# Patient Record
Sex: FEMALE | Race: WHITE | NOT HISPANIC OR LATINO | Employment: OTHER | URBAN - METROPOLITAN AREA
[De-identification: names, ages, dates, MRNs, and addresses within clinical notes are randomized per-mention and may not be internally consistent; named-entity substitution may affect disease eponyms.]

---

## 2017-02-24 ENCOUNTER — HOSPITAL ENCOUNTER (OUTPATIENT)
Facility: HOSPITAL | Age: 62
Setting detail: HOSPITAL OUTPATIENT SURGERY
End: 2017-02-24
Attending: ORTHOPAEDIC SURGERY | Admitting: ORTHOPAEDIC SURGERY

## 2017-04-18 ENCOUNTER — APPOINTMENT (OUTPATIENT)
Dept: PREADMISSION TESTING | Facility: HOSPITAL | Age: 62
End: 2017-04-18

## 2017-04-18 VITALS
OXYGEN SATURATION: 98 % | DIASTOLIC BLOOD PRESSURE: 75 MMHG | SYSTOLIC BLOOD PRESSURE: 129 MMHG | RESPIRATION RATE: 16 BRPM | BODY MASS INDEX: 33.91 KG/M2 | WEIGHT: 211 LBS | TEMPERATURE: 98.6 F | HEIGHT: 66 IN | HEART RATE: 103 BPM

## 2017-04-18 LAB
ALBUMIN SERPL-MCNC: 3.8 G/DL (ref 3.5–5.2)
ALBUMIN/GLOB SERPL: 1.1 G/DL
ALP SERPL-CCNC: 75 U/L (ref 39–117)
ALT SERPL W P-5'-P-CCNC: 10 U/L (ref 1–33)
ANION GAP SERPL CALCULATED.3IONS-SCNC: 20.2 MMOL/L
AST SERPL-CCNC: 14 U/L (ref 1–32)
BASOPHILS # BLD AUTO: 0.02 10*3/MM3 (ref 0–0.2)
BASOPHILS NFR BLD AUTO: 0.2 % (ref 0–1.5)
BILIRUB SERPL-MCNC: 0.3 MG/DL (ref 0.1–1.2)
BUN BLD-MCNC: 18 MG/DL (ref 8–23)
BUN/CREAT SERPL: 31 (ref 7–25)
CALCIUM SPEC-SCNC: 10 MG/DL (ref 8.6–10.5)
CHLORIDE SERPL-SCNC: 96 MMOL/L (ref 98–107)
CO2 SERPL-SCNC: 23.8 MMOL/L (ref 22–29)
CREAT BLD-MCNC: 0.58 MG/DL (ref 0.57–1)
DEPRECATED RDW RBC AUTO: 50.4 FL (ref 37–54)
EOSINOPHIL # BLD AUTO: 0.07 10*3/MM3 (ref 0–0.7)
EOSINOPHIL NFR BLD AUTO: 0.5 % (ref 0.3–6.2)
ERYTHROCYTE [DISTWIDTH] IN BLOOD BY AUTOMATED COUNT: 16.3 % (ref 11.7–13)
GFR SERPL CREATININE-BSD FRML MDRD: 105 ML/MIN/1.73
GLOBULIN UR ELPH-MCNC: 3.4 GM/DL
GLUCOSE BLD-MCNC: 147 MG/DL (ref 65–99)
HBA1C MFR BLD: 8.25 % (ref 4.8–5.6)
HCT VFR BLD AUTO: 41.9 % (ref 35.6–45.5)
HGB BLD-MCNC: 13 G/DL (ref 11.9–15.5)
IMM GRANULOCYTES # BLD: 0.11 10*3/MM3 (ref 0–0.03)
IMM GRANULOCYTES NFR BLD: 0.8 % (ref 0–0.5)
LYMPHOCYTES # BLD AUTO: 2.15 10*3/MM3 (ref 0.9–4.8)
LYMPHOCYTES NFR BLD AUTO: 16.3 % (ref 19.6–45.3)
MCH RBC QN AUTO: 26.5 PG (ref 26.9–32)
MCHC RBC AUTO-ENTMCNC: 31 G/DL (ref 32.4–36.3)
MCV RBC AUTO: 85.3 FL (ref 80.5–98.2)
MONOCYTES # BLD AUTO: 0.49 10*3/MM3 (ref 0.2–1.2)
MONOCYTES NFR BLD AUTO: 3.7 % (ref 5–12)
NEUTROPHILS # BLD AUTO: 10.37 10*3/MM3 (ref 1.9–8.1)
NEUTROPHILS NFR BLD AUTO: 78.5 % (ref 42.7–76)
PLATELET # BLD AUTO: 271 10*3/MM3 (ref 140–500)
PMV BLD AUTO: 10.5 FL (ref 6–12)
POTASSIUM BLD-SCNC: 3.6 MMOL/L (ref 3.5–5.2)
PROT SERPL-MCNC: 7.2 G/DL (ref 6–8.5)
RBC # BLD AUTO: 4.91 10*6/MM3 (ref 3.9–5.2)
SODIUM BLD-SCNC: 140 MMOL/L (ref 136–145)
WBC NRBC COR # BLD: 13.21 10*3/MM3 (ref 4.5–10.7)

## 2017-04-18 PROCEDURE — 80053 COMPREHEN METABOLIC PANEL: CPT

## 2017-04-18 PROCEDURE — 36415 COLL VENOUS BLD VENIPUNCTURE: CPT

## 2017-04-18 PROCEDURE — 83036 HEMOGLOBIN GLYCOSYLATED A1C: CPT

## 2017-04-18 PROCEDURE — 85025 COMPLETE CBC W/AUTO DIFF WBC: CPT

## 2017-04-18 PROCEDURE — 93010 ELECTROCARDIOGRAM REPORT: CPT | Performed by: INTERNAL MEDICINE

## 2017-04-18 PROCEDURE — 93005 ELECTROCARDIOGRAM TRACING: CPT

## 2017-04-18 NOTE — DISCHARGE INSTRUCTIONS
Take the following medications the morning of surgery with a small sip of water: AMLODIPINE        General Instructions:  • Do not eat solid food after midnight the night before surgery.  • You may drink clear liquids day of surgery but must stop at least one hour before your hospital arrival time.  • It is beneficial for you to have a clear drink that contains carbohydrates the day of surgery.  We suggest a 20 ounce bottle of Gatorade or Powerade for non-diabetic patients or a 20 ounce bottle of G2 or Powerade Zero for diabetic patients. (Pediatric patients, are not advised to drink a 20 ounce carbohydrate drink)    Clear liquids are liquids you can see through.  Nothing red in color.     Plain water                               Sports drinks  Sodas                                   Gelatin (Jell-O)  Fruit juices without pulp such as white grape juice and apple juice  Popsicles that contain no fruit or yogurt  Tea or coffee (no cream or milk added)  Gatorade / Powerade  G2 / Powerade Zero    • Infants may have breast milk up to four hours before surgery.  • Infants drinking formula may drink formula up to six hours before surgery.   • Patients who avoid smoking, chewing tobacco and alcohol for 4 weeks prior to surgery have a reduced risk of post-operative complications.  Quit smoking as many days before surgery as you can.  • Do not smoke, use chewing tobacco or drink alcohol the day of surgery.   • If applicable bring your C-PAP/ BI-PAP machine.  • Bring any papers given to you in the doctor’s office.  • Wear clean comfortable clothes and socks.  • Do not wear contact lenses or make-up.  Bring a case for your glasses.   • Bring crutches or walker if applicable.  • Leave all other valuables and jewelry at home.  • The Pre-Admission Testing nurse will instruct you to bring medications if unable to obtain an accurate list in Pre-Admission Testing.        If you were given a blood bank ID arm band remember to bring  it with you the day of surgery.    Preventing a Surgical Site Infection:  • For 2 to 3 days before surgery, avoid shaving with a razor because the razor can irritate skin and make it easier to develop an infection.  • The night prior to surgery sleep in a clean bed with clean clothing.  Do not allow pets to sleep with you.  • Shower on the morning of surgery using a fresh bar of anti-bacterial soap (such as Dial) and clean washcloth.  Dry with a clean towel and dress in clean clothing.  • Ask your surgeon if you will be receiving antibiotics prior to surgery.  • Make sure you, your family, and all healthcare providers clean their hands with soap and water or an alcohol based hand  before caring for you or your wound.    Day of surgery:  Upon arrival, a Pre-op nurse and Anesthesiologist will review your health history, obtain vital signs, and answer questions you may have.  The only belongings needed at this time will be your home medications and if applicable your C-PAP/BI-PAP machine.  If you are staying overnight your family can leave the rest of your belongings in the car and bring them to your room later.  A Pre-op nurse will start an IV and you may receive medication in preparation for surgery, including something to help you relax.  Your family will be able to see you in the Pre-op area.  While you are in surgery your family should notify the waiting room  if they leave the waiting room area and provide a contact phone number.    Please be aware that surgery does come with discomfort.  We want to make every effort to control your discomfort so please discuss any uncontrolled symptoms with your nurse.   Your doctor will most likely have prescribed pain medications.      If you are going home after surgery you will receive individualized written care instructions before being discharged.  A responsible adult must drive you to and from the hospital on the day of your surgery and stay with you  for 24 hours.    If you are staying overnight following surgery, you will be transported to your hospital room following the recovery period.  [facility] has all private rooms.    If you have any questions please call Pre-Admission Testing at [phone].  Deductibles and co-payments are collected on the day of service. Please be prepared to pay the required co-pay, deductible or deposit on the day of service as defined by your plan.

## 2017-05-17 ENCOUNTER — TRANSCRIBE ORDERS (OUTPATIENT)
Dept: ADMINISTRATIVE | Facility: HOSPITAL | Age: 62
End: 2017-05-17

## 2017-05-17 ENCOUNTER — LAB (OUTPATIENT)
Dept: LAB | Facility: HOSPITAL | Age: 62
End: 2017-05-17
Attending: ORTHOPAEDIC SURGERY

## 2017-05-17 DIAGNOSIS — Z01.818 PRE-OP EXAM: ICD-10-CM

## 2017-05-17 DIAGNOSIS — Z01.818 PRE-OP EXAM: Primary | ICD-10-CM

## 2017-05-17 LAB
ALBUMIN SERPL-MCNC: 3.8 G/DL (ref 3.5–5.2)
ALBUMIN/GLOB SERPL: 1 G/DL
ALP SERPL-CCNC: 79 U/L (ref 39–117)
ALT SERPL W P-5'-P-CCNC: 7 U/L (ref 1–33)
ANION GAP SERPL CALCULATED.3IONS-SCNC: 17.1 MMOL/L
AST SERPL-CCNC: 13 U/L (ref 1–32)
BASOPHILS # BLD AUTO: 0.04 10*3/MM3 (ref 0–0.2)
BASOPHILS NFR BLD AUTO: 0.3 % (ref 0–1.5)
BILIRUB SERPL-MCNC: 0.3 MG/DL (ref 0.1–1.2)
BUN BLD-MCNC: 23 MG/DL (ref 8–23)
BUN/CREAT SERPL: 37.7 (ref 7–25)
CALCIUM SPEC-SCNC: 10 MG/DL (ref 8.6–10.5)
CHLORIDE SERPL-SCNC: 95 MMOL/L (ref 98–107)
CO2 SERPL-SCNC: 27.9 MMOL/L (ref 22–29)
CREAT BLD-MCNC: 0.61 MG/DL (ref 0.57–1)
DEPRECATED RDW RBC AUTO: 49.7 FL (ref 37–54)
EOSINOPHIL # BLD AUTO: 0.11 10*3/MM3 (ref 0–0.7)
EOSINOPHIL NFR BLD AUTO: 0.9 % (ref 0.3–6.2)
ERYTHROCYTE [DISTWIDTH] IN BLOOD BY AUTOMATED COUNT: 16.3 % (ref 11.7–13)
GFR SERPL CREATININE-BSD FRML MDRD: 99 ML/MIN/1.73
GLOBULIN UR ELPH-MCNC: 3.7 GM/DL
GLUCOSE BLD-MCNC: 156 MG/DL (ref 65–99)
HBA1C MFR BLD: 7.5 % (ref 4.8–5.6)
HCT VFR BLD AUTO: 41.2 % (ref 35.6–45.5)
HGB BLD-MCNC: 13 G/DL (ref 11.9–15.5)
IMM GRANULOCYTES # BLD: 0.12 10*3/MM3 (ref 0–0.03)
IMM GRANULOCYTES NFR BLD: 1 % (ref 0–0.5)
LYMPHOCYTES # BLD AUTO: 2.28 10*3/MM3 (ref 0.9–4.8)
LYMPHOCYTES NFR BLD AUTO: 19.7 % (ref 19.6–45.3)
MCH RBC QN AUTO: 26.6 PG (ref 26.9–32)
MCHC RBC AUTO-ENTMCNC: 31.6 G/DL (ref 32.4–36.3)
MCV RBC AUTO: 84.4 FL (ref 80.5–98.2)
MONOCYTES # BLD AUTO: 0.56 10*3/MM3 (ref 0.2–1.2)
MONOCYTES NFR BLD AUTO: 4.8 % (ref 5–12)
NEUTROPHILS # BLD AUTO: 8.48 10*3/MM3 (ref 1.9–8.1)
NEUTROPHILS NFR BLD AUTO: 73.3 % (ref 42.7–76)
PLATELET # BLD AUTO: 246 10*3/MM3 (ref 140–500)
PMV BLD AUTO: 10.4 FL (ref 6–12)
POTASSIUM BLD-SCNC: 4.1 MMOL/L (ref 3.5–5.2)
PROT SERPL-MCNC: 7.5 G/DL (ref 6–8.5)
RBC # BLD AUTO: 4.88 10*6/MM3 (ref 3.9–5.2)
SODIUM BLD-SCNC: 140 MMOL/L (ref 136–145)
WBC NRBC COR # BLD: 11.59 10*3/MM3 (ref 4.5–10.7)

## 2017-05-17 PROCEDURE — 85025 COMPLETE CBC W/AUTO DIFF WBC: CPT

## 2017-05-17 PROCEDURE — 36415 COLL VENOUS BLD VENIPUNCTURE: CPT

## 2017-05-17 PROCEDURE — 83036 HEMOGLOBIN GLYCOSYLATED A1C: CPT

## 2017-05-17 PROCEDURE — 80053 COMPREHEN METABOLIC PANEL: CPT

## 2019-06-17 ENCOUNTER — LAB (OUTPATIENT)
Dept: LAB | Facility: HOSPITAL | Age: 64
End: 2019-06-17

## 2019-06-17 ENCOUNTER — HOSPITAL ENCOUNTER (OUTPATIENT)
Dept: CARDIOLOGY | Facility: HOSPITAL | Age: 64
Discharge: HOME | End: 2019-06-17
Admitting: ORTHOPAEDIC SURGERY

## 2019-06-17 ENCOUNTER — TRANSCRIBE ORDERS (OUTPATIENT)
Dept: ADMINISTRATIVE | Facility: HOSPITAL | Age: 64
End: 2019-06-17

## 2019-06-17 DIAGNOSIS — Z01.818 PRE-OP TESTING: Primary | ICD-10-CM

## 2019-06-17 DIAGNOSIS — Z01.818 PRE-OP TESTING: ICD-10-CM

## 2019-06-17 LAB
ALBUMIN SERPL-MCNC: 4.3 G/DL (ref 3.5–5.2)
ALBUMIN/GLOB SERPL: 1.5 G/DL
ALP SERPL-CCNC: 75 U/L (ref 39–117)
ALT SERPL W P-5'-P-CCNC: 14 U/L (ref 1–33)
ANION GAP SERPL CALCULATED.3IONS-SCNC: 12.9 MMOL/L
AST SERPL-CCNC: 18 U/L (ref 1–32)
BASOPHILS # BLD AUTO: 0.05 10*3/MM3 (ref 0–0.2)
BASOPHILS NFR BLD AUTO: 0.5 % (ref 0–1.5)
BILIRUB SERPL-MCNC: 0.3 MG/DL (ref 0.2–1.2)
BUN BLD-MCNC: 22 MG/DL (ref 8–23)
BUN/CREAT SERPL: 37.3 (ref 7–25)
CALCIUM SPEC-SCNC: 10.2 MG/DL (ref 8.6–10.5)
CHLORIDE SERPL-SCNC: 98 MMOL/L (ref 98–107)
CO2 SERPL-SCNC: 27.1 MMOL/L (ref 22–29)
CREAT BLD-MCNC: 0.59 MG/DL (ref 0.57–1)
DEPRECATED RDW RBC AUTO: 48.9 FL (ref 37–54)
EOSINOPHIL # BLD AUTO: 0.11 10*3/MM3 (ref 0–0.4)
EOSINOPHIL NFR BLD AUTO: 1 % (ref 0.3–6.2)
ERYTHROCYTE [DISTWIDTH] IN BLOOD BY AUTOMATED COUNT: 15.1 % (ref 12.3–15.4)
GFR SERPL CREATININE-BSD FRML MDRD: 103 ML/MIN/1.73
GLOBULIN UR ELPH-MCNC: 2.9 GM/DL
GLUCOSE BLD-MCNC: 114 MG/DL (ref 65–99)
HCT VFR BLD AUTO: 46.7 % (ref 34–46.6)
HGB BLD-MCNC: 14.2 G/DL (ref 12–15.9)
IMM GRANULOCYTES # BLD AUTO: 0.14 10*3/MM3 (ref 0–0.05)
IMM GRANULOCYTES NFR BLD AUTO: 1.3 % (ref 0–0.5)
LYMPHOCYTES # BLD AUTO: 2.45 10*3/MM3 (ref 0.7–3.1)
LYMPHOCYTES NFR BLD AUTO: 23 % (ref 19.6–45.3)
MCH RBC QN AUTO: 26.9 PG (ref 26.6–33)
MCHC RBC AUTO-ENTMCNC: 30.4 G/DL (ref 31.5–35.7)
MCV RBC AUTO: 88.6 FL (ref 79–97)
MONOCYTES # BLD AUTO: 0.7 10*3/MM3 (ref 0.1–0.9)
MONOCYTES NFR BLD AUTO: 6.6 % (ref 5–12)
NEUTROPHILS # BLD AUTO: 7.22 10*3/MM3 (ref 1.7–7)
NEUTROPHILS NFR BLD AUTO: 67.6 % (ref 42.7–76)
NRBC BLD AUTO-RTO: 0 /100 WBC (ref 0–0.2)
PLATELET # BLD AUTO: 246 10*3/MM3 (ref 140–450)
PMV BLD AUTO: 10.2 FL (ref 6–12)
POTASSIUM BLD-SCNC: 3.8 MMOL/L (ref 3.5–5.2)
PROT SERPL-MCNC: 7.2 G/DL (ref 6–8.5)
RBC # BLD AUTO: 5.27 10*6/MM3 (ref 3.77–5.28)
SODIUM BLD-SCNC: 138 MMOL/L (ref 136–145)
WBC NRBC COR # BLD: 10.67 10*3/MM3 (ref 3.4–10.8)

## 2019-06-17 PROCEDURE — 85025 COMPLETE CBC W/AUTO DIFF WBC: CPT

## 2019-06-17 PROCEDURE — 93005 ELECTROCARDIOGRAM TRACING: CPT | Performed by: ORTHOPAEDIC SURGERY

## 2019-06-17 PROCEDURE — 36415 COLL VENOUS BLD VENIPUNCTURE: CPT

## 2019-06-17 PROCEDURE — 80053 COMPREHEN METABOLIC PANEL: CPT

## 2019-06-17 PROCEDURE — 93010 ELECTROCARDIOGRAM REPORT: CPT | Performed by: INTERNAL MEDICINE

## 2021-02-11 ENCOUNTER — HOSPITAL ENCOUNTER (INPATIENT)
Facility: HOSPITAL | Age: 66
LOS: 6 days | Discharge: HOME | End: 2021-02-17
Attending: EMERGENCY MEDICINE | Admitting: STUDENT IN AN ORGANIZED HEALTH CARE EDUCATION/TRAINING PROGRAM

## 2021-02-11 ENCOUNTER — APPOINTMENT (OUTPATIENT)
Dept: GENERAL RADIOLOGY | Facility: HOSPITAL | Age: 66
End: 2021-02-11

## 2021-02-11 ENCOUNTER — APPOINTMENT (OUTPATIENT)
Dept: CT IMAGING | Facility: HOSPITAL | Age: 66
End: 2021-02-11

## 2021-02-11 DIAGNOSIS — K56.609 SMALL BOWEL OBSTRUCTION (HCC): Primary | ICD-10-CM

## 2021-02-11 DIAGNOSIS — K56.609 BOWEL OBSTRUCTION (HCC): ICD-10-CM

## 2021-02-11 DIAGNOSIS — K43.6 VENTRAL HERNIA WITH BOWEL OBSTRUCTION: ICD-10-CM

## 2021-02-11 LAB
ALBUMIN SERPL-MCNC: 3.7 G/DL (ref 3.5–5.2)
ALBUMIN/GLOB SERPL: 1.2 G/DL
ALP SERPL-CCNC: 82 U/L (ref 39–117)
ALT SERPL W P-5'-P-CCNC: 5 U/L (ref 1–33)
ANION GAP SERPL CALCULATED.3IONS-SCNC: 21.9 MMOL/L (ref 5–15)
AST SERPL-CCNC: 8 U/L (ref 1–32)
BASOPHILS # BLD AUTO: 0.1 10*3/MM3 (ref 0–0.2)
BASOPHILS NFR BLD AUTO: 0.5 % (ref 0–1.5)
BILIRUB SERPL-MCNC: 0.4 MG/DL (ref 0–1.2)
BILIRUB UR QL STRIP: NEGATIVE
BUN SERPL-MCNC: 45 MG/DL (ref 8–23)
BUN/CREAT SERPL: 50 (ref 7–25)
CALCIUM SPEC-SCNC: 10.6 MG/DL (ref 8.6–10.5)
CHLORIDE SERPL-SCNC: 89 MMOL/L (ref 98–107)
CLARITY UR: CLEAR
CO2 SERPL-SCNC: 26.1 MMOL/L (ref 22–29)
COLOR UR: YELLOW
CREAT SERPL-MCNC: 0.9 MG/DL (ref 0.57–1)
DEPRECATED RDW RBC AUTO: 42.1 FL (ref 37–54)
EOSINOPHIL # BLD AUTO: 0.12 10*3/MM3 (ref 0–0.4)
EOSINOPHIL NFR BLD AUTO: 0.6 % (ref 0.3–6.2)
ERYTHROCYTE [DISTWIDTH] IN BLOOD BY AUTOMATED COUNT: 13.9 % (ref 12.3–15.4)
GFR SERPL CREATININE-BSD FRML MDRD: 63 ML/MIN/1.73
GLOBULIN UR ELPH-MCNC: 3 GM/DL
GLUCOSE SERPL-MCNC: 145 MG/DL (ref 65–99)
GLUCOSE UR STRIP-MCNC: ABNORMAL MG/DL
HCT VFR BLD AUTO: 46.1 % (ref 34–46.6)
HGB BLD-MCNC: 14.8 G/DL (ref 12–15.9)
HGB UR QL STRIP.AUTO: NEGATIVE
IMM GRANULOCYTES # BLD AUTO: 0.29 10*3/MM3 (ref 0–0.05)
IMM GRANULOCYTES NFR BLD AUTO: 1.3 % (ref 0–0.5)
KETONES UR QL STRIP: ABNORMAL
LEUKOCYTE ESTERASE UR QL STRIP.AUTO: NEGATIVE
LIPASE SERPL-CCNC: 12 U/L (ref 13–60)
LYMPHOCYTES # BLD AUTO: 2.97 10*3/MM3 (ref 0.7–3.1)
LYMPHOCYTES NFR BLD AUTO: 13.7 % (ref 19.6–45.3)
MCH RBC QN AUTO: 26.9 PG (ref 26.6–33)
MCHC RBC AUTO-ENTMCNC: 32.1 G/DL (ref 31.5–35.7)
MCV RBC AUTO: 83.7 FL (ref 79–97)
MONOCYTES # BLD AUTO: 1.34 10*3/MM3 (ref 0.1–0.9)
MONOCYTES NFR BLD AUTO: 6.2 % (ref 5–12)
NEUTROPHILS NFR BLD AUTO: 16.81 10*3/MM3 (ref 1.7–7)
NEUTROPHILS NFR BLD AUTO: 77.7 % (ref 42.7–76)
NITRITE UR QL STRIP: NEGATIVE
NRBC BLD AUTO-RTO: 0 /100 WBC (ref 0–0.2)
PH UR STRIP.AUTO: <=5 [PH] (ref 5–8)
PLATELET # BLD AUTO: 376 10*3/MM3 (ref 140–450)
PMV BLD AUTO: 10 FL (ref 6–12)
POTASSIUM SERPL-SCNC: 3.4 MMOL/L (ref 3.5–5.2)
PROT SERPL-MCNC: 6.7 G/DL (ref 6–8.5)
PROT UR QL STRIP: NEGATIVE
RBC # BLD AUTO: 5.51 10*6/MM3 (ref 3.77–5.28)
SODIUM SERPL-SCNC: 137 MMOL/L (ref 136–145)
SP GR UR STRIP: 1.03 (ref 1–1.03)
UROBILINOGEN UR QL STRIP: ABNORMAL
WBC # BLD AUTO: 21.63 10*3/MM3 (ref 3.4–10.8)

## 2021-02-11 PROCEDURE — 99285 EMERGENCY DEPT VISIT HI MDM: CPT

## 2021-02-11 PROCEDURE — U0003 INFECTIOUS AGENT DETECTION BY NUCLEIC ACID (DNA OR RNA); SEVERE ACUTE RESPIRATORY SYNDROME CORONAVIRUS 2 (SARS-COV-2) (CORONAVIRUS DISEASE [COVID-19]), AMPLIFIED PROBE TECHNIQUE, MAKING USE OF HIGH THROUGHPUT TECHNOLOGIES AS DESCRIBED BY CMS-2020-01-R: HCPCS | Performed by: EMERGENCY MEDICINE

## 2021-02-11 PROCEDURE — 74177 CT ABD & PELVIS W/CONTRAST: CPT

## 2021-02-11 PROCEDURE — 80053 COMPREHEN METABOLIC PANEL: CPT | Performed by: EMERGENCY MEDICINE

## 2021-02-11 PROCEDURE — 25010000002 IOPAMIDOL 61 % SOLUTION: Performed by: EMERGENCY MEDICINE

## 2021-02-11 PROCEDURE — 83690 ASSAY OF LIPASE: CPT | Performed by: EMERGENCY MEDICINE

## 2021-02-11 PROCEDURE — U0005 INFEC AGEN DETEC AMPLI PROBE: HCPCS | Performed by: EMERGENCY MEDICINE

## 2021-02-11 PROCEDURE — 85025 COMPLETE CBC W/AUTO DIFF WBC: CPT | Performed by: EMERGENCY MEDICINE

## 2021-02-11 PROCEDURE — 74018 RADEX ABDOMEN 1 VIEW: CPT

## 2021-02-11 PROCEDURE — 81003 URINALYSIS AUTO W/O SCOPE: CPT | Performed by: EMERGENCY MEDICINE

## 2021-02-11 RX ORDER — SODIUM CHLORIDE 0.9 % (FLUSH) 0.9 %
10 SYRINGE (ML) INJECTION AS NEEDED
Status: DISCONTINUED | OUTPATIENT
Start: 2021-02-11 | End: 2021-02-17 | Stop reason: HOSPADM

## 2021-02-11 RX ORDER — ATORVASTATIN CALCIUM 10 MG/1
10 TABLET, FILM COATED ORAL NIGHTLY
COMMUNITY

## 2021-02-11 RX ORDER — HYDROCHLOROTHIAZIDE 50 MG/1
50 TABLET ORAL DAILY
COMMUNITY
End: 2021-10-17 | Stop reason: HOSPADM

## 2021-02-11 RX ORDER — LIDOCAINE HYDROCHLORIDE 20 MG/ML
15 SOLUTION OROPHARYNGEAL ONCE
Status: COMPLETED | OUTPATIENT
Start: 2021-02-11 | End: 2021-02-11

## 2021-02-11 RX ORDER — EMPAGLIFLOZIN 25 MG/1
25 TABLET, FILM COATED ORAL DAILY
COMMUNITY

## 2021-02-11 RX ORDER — ALUMINA, MAGNESIA, AND SIMETHICONE 2400; 2400; 240 MG/30ML; MG/30ML; MG/30ML
15 SUSPENSION ORAL ONCE
Status: COMPLETED | OUTPATIENT
Start: 2021-02-11 | End: 2021-02-11

## 2021-02-11 RX ORDER — BENAZEPRIL HYDROCHLORIDE 20 MG/1
40 TABLET ORAL DAILY
COMMUNITY
End: 2021-10-17 | Stop reason: HOSPADM

## 2021-02-11 RX ORDER — AMLODIPINE BESYLATE 10 MG/1
10 TABLET ORAL DAILY
COMMUNITY

## 2021-02-11 RX ADMIN — IOPAMIDOL 85 ML: 612 INJECTION, SOLUTION INTRAVENOUS at 21:58

## 2021-02-11 RX ADMIN — MAGNESIUM HYDROXIDE,ALUMINUM HYDROXICE,SIMETHICONE 15 ML: 240; 2400; 2400 SUSPENSION ORAL at 22:12

## 2021-02-11 RX ADMIN — LIDOCAINE HYDROCHLORIDE 15 ML: 20 SOLUTION ORAL; TOPICAL at 22:12

## 2021-02-11 RX ADMIN — SODIUM CHLORIDE, PRESERVATIVE FREE 10 ML: 5 INJECTION INTRAVENOUS at 19:56

## 2021-02-11 NOTE — ED TRIAGE NOTES
Pt complains of poor appetite.  Pt states she started with and pain Sunday evening.  States she vomited all day Monday. States today pains and vomiting have subsided but her appetite is poor.  Mask placed on patient in triage.  Triage RN wearing mask throughout encounter.

## 2021-02-12 ENCOUNTER — ANESTHESIA EVENT (OUTPATIENT)
Dept: PERIOP | Facility: HOSPITAL | Age: 66
End: 2021-02-12

## 2021-02-12 ENCOUNTER — ANESTHESIA (OUTPATIENT)
Dept: PERIOP | Facility: HOSPITAL | Age: 66
End: 2021-02-12

## 2021-02-12 LAB
ANION GAP SERPL CALCULATED.3IONS-SCNC: 17.7 MMOL/L (ref 5–15)
BASOPHILS # BLD AUTO: 0.06 10*3/MM3 (ref 0–0.2)
BASOPHILS NFR BLD AUTO: 0.4 % (ref 0–1.5)
BUN SERPL-MCNC: 44 MG/DL (ref 8–23)
BUN/CREAT SERPL: 58.7 (ref 7–25)
CALCIUM SPEC-SCNC: 9.3 MG/DL (ref 8.6–10.5)
CHLORIDE SERPL-SCNC: 93 MMOL/L (ref 98–107)
CO2 SERPL-SCNC: 23.3 MMOL/L (ref 22–29)
CREAT SERPL-MCNC: 0.75 MG/DL (ref 0.57–1)
DEPRECATED RDW RBC AUTO: 41.1 FL (ref 37–54)
EOSINOPHIL # BLD AUTO: 0.07 10*3/MM3 (ref 0–0.4)
EOSINOPHIL NFR BLD AUTO: 0.4 % (ref 0.3–6.2)
ERYTHROCYTE [DISTWIDTH] IN BLOOD BY AUTOMATED COUNT: 13.6 % (ref 12.3–15.4)
GFR SERPL CREATININE-BSD FRML MDRD: 77 ML/MIN/1.73
GLUCOSE BLDC GLUCOMTR-MCNC: 169 MG/DL (ref 70–130)
GLUCOSE BLDC GLUCOMTR-MCNC: 172 MG/DL (ref 70–130)
GLUCOSE BLDC GLUCOMTR-MCNC: 179 MG/DL (ref 70–130)
GLUCOSE BLDC GLUCOMTR-MCNC: 180 MG/DL (ref 70–130)
GLUCOSE SERPL-MCNC: 166 MG/DL (ref 65–99)
HCT VFR BLD AUTO: 34.9 % (ref 34–46.6)
HGB BLD-MCNC: 11.7 G/DL (ref 12–15.9)
IMM GRANULOCYTES # BLD AUTO: 0.17 10*3/MM3 (ref 0–0.05)
IMM GRANULOCYTES NFR BLD AUTO: 1 % (ref 0–0.5)
LYMPHOCYTES # BLD AUTO: 1.36 10*3/MM3 (ref 0.7–3.1)
LYMPHOCYTES NFR BLD AUTO: 8.4 % (ref 19.6–45.3)
MCH RBC QN AUTO: 28.3 PG (ref 26.6–33)
MCHC RBC AUTO-ENTMCNC: 33.5 G/DL (ref 31.5–35.7)
MCV RBC AUTO: 84.3 FL (ref 79–97)
MONOCYTES # BLD AUTO: 1.17 10*3/MM3 (ref 0.1–0.9)
MONOCYTES NFR BLD AUTO: 7.2 % (ref 5–12)
NEUTROPHILS NFR BLD AUTO: 13.43 10*3/MM3 (ref 1.7–7)
NEUTROPHILS NFR BLD AUTO: 82.6 % (ref 42.7–76)
NRBC BLD AUTO-RTO: 0 /100 WBC (ref 0–0.2)
PLATELET # BLD AUTO: 242 10*3/MM3 (ref 140–450)
PMV BLD AUTO: 10.1 FL (ref 6–12)
POTASSIUM SERPL-SCNC: 3.2 MMOL/L (ref 3.5–5.2)
RBC # BLD AUTO: 4.14 10*6/MM3 (ref 3.77–5.28)
SARS-COV-2 RNA RESP QL NAA+PROBE: NOT DETECTED
SODIUM SERPL-SCNC: 134 MMOL/L (ref 136–145)
WBC # BLD AUTO: 16.26 10*3/MM3 (ref 3.4–10.8)

## 2021-02-12 PROCEDURE — 25010000002 ENOXAPARIN PER 10 MG: Performed by: STUDENT IN AN ORGANIZED HEALTH CARE EDUCATION/TRAINING PROGRAM

## 2021-02-12 PROCEDURE — 80048 BASIC METABOLIC PNL TOTAL CA: CPT | Performed by: STUDENT IN AN ORGANIZED HEALTH CARE EDUCATION/TRAINING PROGRAM

## 2021-02-12 PROCEDURE — 25010000003 POTASSIUM CHLORIDE 10 MEQ/100ML SOLUTION: Performed by: STUDENT IN AN ORGANIZED HEALTH CARE EDUCATION/TRAINING PROGRAM

## 2021-02-12 PROCEDURE — 25010000002 MIDAZOLAM PER 1 MG: Performed by: NURSE ANESTHETIST, CERTIFIED REGISTERED

## 2021-02-12 PROCEDURE — 25010000002 LEVOFLOXACIN PER 250 MG: Performed by: STUDENT IN AN ORGANIZED HEALTH CARE EDUCATION/TRAINING PROGRAM

## 2021-02-12 PROCEDURE — 25010000002 FENTANYL CITRATE (PF) 100 MCG/2ML SOLUTION: Performed by: NURSE ANESTHETIST, CERTIFIED REGISTERED

## 2021-02-12 PROCEDURE — 25010000002 HYDROMORPHONE PER 4 MG: Performed by: STUDENT IN AN ORGANIZED HEALTH CARE EDUCATION/TRAINING PROGRAM

## 2021-02-12 PROCEDURE — 25010000002 ONDANSETRON PER 1 MG: Performed by: NURSE ANESTHETIST, CERTIFIED REGISTERED

## 2021-02-12 PROCEDURE — 49561 PR REPAIR INCISIONAL HERNIA,STRANG: CPT | Performed by: STUDENT IN AN ORGANIZED HEALTH CARE EDUCATION/TRAINING PROGRAM

## 2021-02-12 PROCEDURE — 85025 COMPLETE CBC W/AUTO DIFF WBC: CPT | Performed by: STUDENT IN AN ORGANIZED HEALTH CARE EDUCATION/TRAINING PROGRAM

## 2021-02-12 PROCEDURE — 25010000002 PHENYLEPHRINE PER 1 ML: Performed by: NURSE ANESTHETIST, CERTIFIED REGISTERED

## 2021-02-12 PROCEDURE — 25010000002 NEOSTIGMINE PER 0.5 MG: Performed by: NURSE ANESTHETIST, CERTIFIED REGISTERED

## 2021-02-12 PROCEDURE — P9041 ALBUMIN (HUMAN),5%, 50ML: HCPCS | Performed by: NURSE ANESTHETIST, CERTIFIED REGISTERED

## 2021-02-12 PROCEDURE — 25010000002 PROPOFOL 10 MG/ML EMULSION: Performed by: NURSE ANESTHETIST, CERTIFIED REGISTERED

## 2021-02-12 PROCEDURE — C9290 INJ, BUPIVACAINE LIPOSOME: HCPCS | Performed by: STUDENT IN AN ORGANIZED HEALTH CARE EDUCATION/TRAINING PROGRAM

## 2021-02-12 PROCEDURE — 25010000002 ONDANSETRON PER 1 MG: Performed by: STUDENT IN AN ORGANIZED HEALTH CARE EDUCATION/TRAINING PROGRAM

## 2021-02-12 PROCEDURE — 82962 GLUCOSE BLOOD TEST: CPT

## 2021-02-12 PROCEDURE — 0DNE0ZZ RELEASE LARGE INTESTINE, OPEN APPROACH: ICD-10-PCS | Performed by: STUDENT IN AN ORGANIZED HEALTH CARE EDUCATION/TRAINING PROGRAM

## 2021-02-12 PROCEDURE — 25010000003 BUPIVACAINE LIPOSOME 1.3 % SUSPENSION 20 ML VIAL: Performed by: STUDENT IN AN ORGANIZED HEALTH CARE EDUCATION/TRAINING PROGRAM

## 2021-02-12 PROCEDURE — 25010000002 FENTANYL CITRATE (PF) 100 MCG/2ML SOLUTION: Performed by: STUDENT IN AN ORGANIZED HEALTH CARE EDUCATION/TRAINING PROGRAM

## 2021-02-12 PROCEDURE — 25010000002 ALBUMIN HUMAN 5% PER 50 ML: Performed by: NURSE ANESTHETIST, CERTIFIED REGISTERED

## 2021-02-12 PROCEDURE — 88302 TISSUE EXAM BY PATHOLOGIST: CPT | Performed by: STUDENT IN AN ORGANIZED HEALTH CARE EDUCATION/TRAINING PROGRAM

## 2021-02-12 PROCEDURE — 99223 1ST HOSP IP/OBS HIGH 75: CPT | Performed by: STUDENT IN AN ORGANIZED HEALTH CARE EDUCATION/TRAINING PROGRAM

## 2021-02-12 PROCEDURE — 25010000002 SUCCINYLCHOLINE PER 20 MG: Performed by: NURSE ANESTHETIST, CERTIFIED REGISTERED

## 2021-02-12 PROCEDURE — 0WQF0ZZ REPAIR ABDOMINAL WALL, OPEN APPROACH: ICD-10-PCS | Performed by: STUDENT IN AN ORGANIZED HEALTH CARE EDUCATION/TRAINING PROGRAM

## 2021-02-12 PROCEDURE — 63710000001 INSULIN REGULAR HUMAN PER 5 UNITS: Performed by: STUDENT IN AN ORGANIZED HEALTH CARE EDUCATION/TRAINING PROGRAM

## 2021-02-12 RX ORDER — ONDANSETRON 2 MG/ML
INJECTION INTRAMUSCULAR; INTRAVENOUS AS NEEDED
Status: DISCONTINUED | OUTPATIENT
Start: 2021-02-12 | End: 2021-02-12 | Stop reason: SURG

## 2021-02-12 RX ORDER — SODIUM CHLORIDE, SODIUM LACTATE, POTASSIUM CHLORIDE, CALCIUM CHLORIDE 600; 310; 30; 20 MG/100ML; MG/100ML; MG/100ML; MG/100ML
INJECTION, SOLUTION INTRAVENOUS CONTINUOUS PRN
Status: DISCONTINUED | OUTPATIENT
Start: 2021-02-12 | End: 2021-02-12 | Stop reason: SURG

## 2021-02-12 RX ORDER — DIPHENHYDRAMINE HYDROCHLORIDE 50 MG/ML
12.5 INJECTION INTRAMUSCULAR; INTRAVENOUS
Status: DISCONTINUED | OUTPATIENT
Start: 2021-02-12 | End: 2021-02-13

## 2021-02-12 RX ORDER — DEXTROSE AND SODIUM CHLORIDE 5; .45 G/100ML; G/100ML
100 INJECTION, SOLUTION INTRAVENOUS CONTINUOUS
Status: DISCONTINUED | OUTPATIENT
Start: 2021-02-12 | End: 2021-02-16

## 2021-02-12 RX ORDER — ONDANSETRON 2 MG/ML
4 INJECTION INTRAMUSCULAR; INTRAVENOUS ONCE AS NEEDED
Status: DISCONTINUED | OUTPATIENT
Start: 2021-02-12 | End: 2021-02-17 | Stop reason: HOSPADM

## 2021-02-12 RX ORDER — PROPOFOL 10 MG/ML
VIAL (ML) INTRAVENOUS AS NEEDED
Status: DISCONTINUED | OUTPATIENT
Start: 2021-02-12 | End: 2021-02-12 | Stop reason: SURG

## 2021-02-12 RX ORDER — LEVOFLOXACIN 5 MG/ML
750 INJECTION, SOLUTION INTRAVENOUS EVERY 24 HOURS
Status: COMPLETED | OUTPATIENT
Start: 2021-02-12 | End: 2021-02-12

## 2021-02-12 RX ORDER — NICOTINE POLACRILEX 4 MG
15 LOZENGE BUCCAL
Status: DISCONTINUED | OUTPATIENT
Start: 2021-02-12 | End: 2021-02-17 | Stop reason: HOSPADM

## 2021-02-12 RX ORDER — SODIUM CHLORIDE 0.9 % (FLUSH) 0.9 %
10 SYRINGE (ML) INJECTION EVERY 12 HOURS SCHEDULED
Status: DISCONTINUED | OUTPATIENT
Start: 2021-02-12 | End: 2021-02-17 | Stop reason: HOSPADM

## 2021-02-12 RX ORDER — HYDROCODONE BITARTRATE AND ACETAMINOPHEN 7.5; 325 MG/1; MG/1
1 TABLET ORAL ONCE AS NEEDED
Status: DISCONTINUED | OUTPATIENT
Start: 2021-02-12 | End: 2021-02-13

## 2021-02-12 RX ORDER — ONDANSETRON 2 MG/ML
4 INJECTION INTRAMUSCULAR; INTRAVENOUS EVERY 6 HOURS PRN
Status: DISCONTINUED | OUTPATIENT
Start: 2021-02-12 | End: 2021-02-17 | Stop reason: HOSPADM

## 2021-02-12 RX ORDER — ROCURONIUM BROMIDE 10 MG/ML
INJECTION, SOLUTION INTRAVENOUS AS NEEDED
Status: DISCONTINUED | OUTPATIENT
Start: 2021-02-12 | End: 2021-02-12 | Stop reason: SURG

## 2021-02-12 RX ORDER — DIPHENHYDRAMINE HCL 25 MG
25 CAPSULE ORAL
Status: DISCONTINUED | OUTPATIENT
Start: 2021-02-12 | End: 2021-02-13

## 2021-02-12 RX ORDER — MAGNESIUM HYDROXIDE 1200 MG/15ML
LIQUID ORAL AS NEEDED
Status: DISCONTINUED | OUTPATIENT
Start: 2021-02-12 | End: 2021-02-12 | Stop reason: HOSPADM

## 2021-02-12 RX ORDER — LIDOCAINE HYDROCHLORIDE 20 MG/ML
INJECTION, SOLUTION INFILTRATION; PERINEURAL AS NEEDED
Status: DISCONTINUED | OUTPATIENT
Start: 2021-02-12 | End: 2021-02-12 | Stop reason: SURG

## 2021-02-12 RX ORDER — HYDROMORPHONE HYDROCHLORIDE 1 MG/ML
0.5 INJECTION, SOLUTION INTRAMUSCULAR; INTRAVENOUS; SUBCUTANEOUS
Status: DISCONTINUED | OUTPATIENT
Start: 2021-02-12 | End: 2021-02-13

## 2021-02-12 RX ORDER — SUCCINYLCHOLINE CHLORIDE 20 MG/ML
INJECTION INTRAMUSCULAR; INTRAVENOUS AS NEEDED
Status: DISCONTINUED | OUTPATIENT
Start: 2021-02-12 | End: 2021-02-12 | Stop reason: SURG

## 2021-02-12 RX ORDER — FAMOTIDINE 10 MG/ML
INJECTION, SOLUTION INTRAVENOUS AS NEEDED
Status: DISCONTINUED | OUTPATIENT
Start: 2021-02-12 | End: 2021-02-12 | Stop reason: SURG

## 2021-02-12 RX ORDER — POTASSIUM CHLORIDE 7.45 MG/ML
10 INJECTION INTRAVENOUS
Status: DISCONTINUED | OUTPATIENT
Start: 2021-02-12 | End: 2021-02-17 | Stop reason: HOSPADM

## 2021-02-12 RX ORDER — GLYCOPYRROLATE 0.2 MG/ML
INJECTION INTRAMUSCULAR; INTRAVENOUS AS NEEDED
Status: DISCONTINUED | OUTPATIENT
Start: 2021-02-12 | End: 2021-02-12 | Stop reason: SURG

## 2021-02-12 RX ORDER — EPHEDRINE SULFATE 50 MG/ML
INJECTION, SOLUTION INTRAVENOUS AS NEEDED
Status: DISCONTINUED | OUTPATIENT
Start: 2021-02-12 | End: 2021-02-12 | Stop reason: SURG

## 2021-02-12 RX ORDER — LABETALOL HYDROCHLORIDE 5 MG/ML
5 INJECTION, SOLUTION INTRAVENOUS
Status: DISCONTINUED | OUTPATIENT
Start: 2021-02-12 | End: 2021-02-13

## 2021-02-12 RX ORDER — SODIUM CHLORIDE 0.9 % (FLUSH) 0.9 %
10 SYRINGE (ML) INJECTION AS NEEDED
Status: DISCONTINUED | OUTPATIENT
Start: 2021-02-12 | End: 2021-02-17 | Stop reason: HOSPADM

## 2021-02-12 RX ORDER — NALOXONE HCL 0.4 MG/ML
0.2 VIAL (ML) INJECTION AS NEEDED
Status: DISCONTINUED | OUTPATIENT
Start: 2021-02-12 | End: 2021-02-17 | Stop reason: HOSPADM

## 2021-02-12 RX ORDER — EPHEDRINE SULFATE 50 MG/ML
5 INJECTION, SOLUTION INTRAVENOUS ONCE AS NEEDED
Status: DISCONTINUED | OUTPATIENT
Start: 2021-02-12 | End: 2021-02-13

## 2021-02-12 RX ORDER — FLUMAZENIL 0.1 MG/ML
0.2 INJECTION INTRAVENOUS AS NEEDED
Status: DISCONTINUED | OUTPATIENT
Start: 2021-02-12 | End: 2021-02-13

## 2021-02-12 RX ORDER — PROMETHAZINE HYDROCHLORIDE 25 MG/1
25 SUPPOSITORY RECTAL ONCE AS NEEDED
Status: DISCONTINUED | OUTPATIENT
Start: 2021-02-12 | End: 2021-02-15

## 2021-02-12 RX ORDER — OXYCODONE AND ACETAMINOPHEN 7.5; 325 MG/1; MG/1
1 TABLET ORAL ONCE AS NEEDED
Status: DISCONTINUED | OUTPATIENT
Start: 2021-02-12 | End: 2021-02-13

## 2021-02-12 RX ORDER — MIDAZOLAM HYDROCHLORIDE 1 MG/ML
INJECTION INTRAMUSCULAR; INTRAVENOUS AS NEEDED
Status: DISCONTINUED | OUTPATIENT
Start: 2021-02-12 | End: 2021-02-12 | Stop reason: SURG

## 2021-02-12 RX ORDER — ALBUMIN, HUMAN INJ 5% 5 %
SOLUTION INTRAVENOUS CONTINUOUS PRN
Status: DISCONTINUED | OUTPATIENT
Start: 2021-02-12 | End: 2021-02-12 | Stop reason: SURG

## 2021-02-12 RX ORDER — DEXTROSE MONOHYDRATE 25 G/50ML
25 INJECTION, SOLUTION INTRAVENOUS
Status: DISCONTINUED | OUTPATIENT
Start: 2021-02-12 | End: 2021-02-17 | Stop reason: HOSPADM

## 2021-02-12 RX ORDER — PROMETHAZINE HYDROCHLORIDE 25 MG/1
25 TABLET ORAL ONCE AS NEEDED
Status: DISCONTINUED | OUTPATIENT
Start: 2021-02-12 | End: 2021-02-15

## 2021-02-12 RX ORDER — FENTANYL CITRATE 50 UG/ML
INJECTION, SOLUTION INTRAMUSCULAR; INTRAVENOUS AS NEEDED
Status: DISCONTINUED | OUTPATIENT
Start: 2021-02-12 | End: 2021-02-12 | Stop reason: SURG

## 2021-02-12 RX ORDER — FENTANYL CITRATE 50 UG/ML
50 INJECTION, SOLUTION INTRAMUSCULAR; INTRAVENOUS
Status: DISCONTINUED | OUTPATIENT
Start: 2021-02-12 | End: 2021-02-13

## 2021-02-12 RX ADMIN — SUCCINYLCHOLINE CHLORIDE 160 MG: 20 INJECTION, SOLUTION INTRAMUSCULAR; INTRAVENOUS; PARENTERAL at 01:17

## 2021-02-12 RX ADMIN — ROCURONIUM BROMIDE 25 MG: 10 INJECTION INTRAVENOUS at 01:22

## 2021-02-12 RX ADMIN — SODIUM CHLORIDE, POTASSIUM CHLORIDE, SODIUM LACTATE AND CALCIUM CHLORIDE: 600; 310; 30; 20 INJECTION, SOLUTION INTRAVENOUS at 02:16

## 2021-02-12 RX ADMIN — DEXTROSE AND SODIUM CHLORIDE 100 ML/HR: 5; 450 INJECTION, SOLUTION INTRAVENOUS at 16:51

## 2021-02-12 RX ADMIN — EPHEDRINE SULFATE 10 MG: 50 INJECTION INTRAVENOUS at 02:25

## 2021-02-12 RX ADMIN — GLYCOPYRROLATE 0.4 MG: 0.2 INJECTION INTRAMUSCULAR; INTRAVENOUS at 03:23

## 2021-02-12 RX ADMIN — MIDAZOLAM 2 MG: 1 INJECTION INTRAMUSCULAR; INTRAVENOUS at 01:01

## 2021-02-12 RX ADMIN — PHENYLEPHRINE HYDROCHLORIDE 100 MCG: 10 INJECTION INTRAVENOUS at 01:26

## 2021-02-12 RX ADMIN — HYDROMORPHONE HYDROCHLORIDE 0.5 MG: 1 INJECTION, SOLUTION INTRAMUSCULAR; INTRAVENOUS; SUBCUTANEOUS at 07:47

## 2021-02-12 RX ADMIN — PROPOFOL 150 MG: 10 INJECTION, EMULSION INTRAVENOUS at 01:17

## 2021-02-12 RX ADMIN — PHENYLEPHRINE HYDROCHLORIDE 100 MCG: 10 INJECTION INTRAVENOUS at 02:18

## 2021-02-12 RX ADMIN — FENTANYL CITRATE 50 MCG: 50 INJECTION, SOLUTION INTRAMUSCULAR; INTRAVENOUS at 04:11

## 2021-02-12 RX ADMIN — ALBUMIN HUMAN: 0.05 INJECTION, SOLUTION INTRAVENOUS at 01:47

## 2021-02-12 RX ADMIN — POTASSIUM CHLORIDE 10 MEQ: 7.46 INJECTION, SOLUTION INTRAVENOUS at 15:25

## 2021-02-12 RX ADMIN — POTASSIUM CHLORIDE 10 MEQ: 7.46 INJECTION, SOLUTION INTRAVENOUS at 18:07

## 2021-02-12 RX ADMIN — FENTANYL CITRATE 50 MCG: 50 INJECTION, SOLUTION INTRAMUSCULAR; INTRAVENOUS at 04:04

## 2021-02-12 RX ADMIN — ROCURONIUM BROMIDE 5 MG: 10 INJECTION INTRAVENOUS at 01:17

## 2021-02-12 RX ADMIN — LIDOCAINE HYDROCHLORIDE 30 MG: 20 INJECTION, SOLUTION INFILTRATION; PERINEURAL at 01:17

## 2021-02-12 RX ADMIN — POTASSIUM CHLORIDE 10 MEQ: 7.46 INJECTION, SOLUTION INTRAVENOUS at 13:41

## 2021-02-12 RX ADMIN — FAMOTIDINE 20 MG: 10 INJECTION, SOLUTION INTRAVENOUS at 01:01

## 2021-02-12 RX ADMIN — FENTANYL CITRATE 25 MCG: 50 INJECTION INTRAMUSCULAR; INTRAVENOUS at 01:29

## 2021-02-12 RX ADMIN — ROCURONIUM BROMIDE 10 MG: 10 INJECTION INTRAVENOUS at 02:21

## 2021-02-12 RX ADMIN — FENTANYL CITRATE 25 MCG: 50 INJECTION INTRAMUSCULAR; INTRAVENOUS at 02:05

## 2021-02-12 RX ADMIN — ROCURONIUM BROMIDE 10 MG: 10 INJECTION INTRAVENOUS at 01:47

## 2021-02-12 RX ADMIN — PHENYLEPHRINE HYDROCHLORIDE 100 MCG: 10 INJECTION INTRAVENOUS at 01:30

## 2021-02-12 RX ADMIN — ENOXAPARIN SODIUM 40 MG: 40 INJECTION SUBCUTANEOUS at 20:48

## 2021-02-12 RX ADMIN — HYDROMORPHONE HYDROCHLORIDE 0.5 MG: 1 INJECTION, SOLUTION INTRAMUSCULAR; INTRAVENOUS; SUBCUTANEOUS at 13:41

## 2021-02-12 RX ADMIN — PHENYLEPHRINE HYDROCHLORIDE 100 MCG: 10 INJECTION INTRAVENOUS at 01:24

## 2021-02-12 RX ADMIN — PHENYLEPHRINE HYDROCHLORIDE 100 MCG: 10 INJECTION INTRAVENOUS at 01:54

## 2021-02-12 RX ADMIN — EPHEDRINE SULFATE 10 MG: 50 INJECTION INTRAVENOUS at 01:20

## 2021-02-12 RX ADMIN — PHENYLEPHRINE HYDROCHLORIDE 100 MCG: 10 INJECTION INTRAVENOUS at 01:40

## 2021-02-12 RX ADMIN — INSULIN HUMAN 2 UNITS: 100 INJECTION, SOLUTION PARENTERAL at 18:07

## 2021-02-12 RX ADMIN — LEVOFLOXACIN 750 MG: 5 INJECTION, SOLUTION INTRAVENOUS at 01:19

## 2021-02-12 RX ADMIN — ONDANSETRON 4 MG: 2 INJECTION INTRAMUSCULAR; INTRAVENOUS at 05:27

## 2021-02-12 RX ADMIN — DEXTROSE AND SODIUM CHLORIDE 100 ML/HR: 5; 450 INJECTION, SOLUTION INTRAVENOUS at 06:29

## 2021-02-12 RX ADMIN — POTASSIUM CHLORIDE 10 MEQ: 7.46 INJECTION, SOLUTION INTRAVENOUS at 16:51

## 2021-02-12 RX ADMIN — INSULIN HUMAN 2 UNITS: 100 INJECTION, SOLUTION PARENTERAL at 07:11

## 2021-02-12 RX ADMIN — HYDROMORPHONE HYDROCHLORIDE 0.5 MG: 1 INJECTION, SOLUTION INTRAMUSCULAR; INTRAVENOUS; SUBCUTANEOUS at 04:26

## 2021-02-12 RX ADMIN — SODIUM CHLORIDE, POTASSIUM CHLORIDE, SODIUM LACTATE AND CALCIUM CHLORIDE: 600; 310; 30; 20 INJECTION, SOLUTION INTRAVENOUS at 03:42

## 2021-02-12 RX ADMIN — ONDANSETRON HYDROCHLORIDE 4 MG: 2 SOLUTION INTRAMUSCULAR; INTRAVENOUS at 01:22

## 2021-02-12 RX ADMIN — INSULIN HUMAN 2 UNITS: 100 INJECTION, SOLUTION PARENTERAL at 13:42

## 2021-02-12 RX ADMIN — HYDROMORPHONE HYDROCHLORIDE 0.5 MG: 1 INJECTION, SOLUTION INTRAMUSCULAR; INTRAVENOUS; SUBCUTANEOUS at 04:55

## 2021-02-12 RX ADMIN — HYDROMORPHONE HYDROCHLORIDE 0.5 MG: 1 INJECTION, SOLUTION INTRAMUSCULAR; INTRAVENOUS; SUBCUTANEOUS at 21:03

## 2021-02-12 RX ADMIN — FENTANYL CITRATE 50 MCG: 50 INJECTION INTRAMUSCULAR; INTRAVENOUS at 02:59

## 2021-02-12 RX ADMIN — GLYCOPYRROLATE 0.2 MG: 0.2 INJECTION INTRAMUSCULAR; INTRAVENOUS at 01:25

## 2021-02-12 RX ADMIN — NEOSTIGMINE METHYLSULFATE 3 MG: 1 INJECTION INTRAMUSCULAR; INTRAVENOUS; SUBCUTANEOUS at 03:23

## 2021-02-12 RX ADMIN — ALBUMIN HUMAN: 0.05 INJECTION, SOLUTION INTRAVENOUS at 01:29

## 2021-02-12 RX ADMIN — SODIUM CHLORIDE, POTASSIUM CHLORIDE, SODIUM LACTATE AND CALCIUM CHLORIDE: 600; 310; 30; 20 INJECTION, SOLUTION INTRAVENOUS at 01:11

## 2021-02-12 NOTE — ANESTHESIA PREPROCEDURE EVALUATION
Anesthesia Evaluation     Patient summary reviewed   history of anesthetic complications: PONV  NPO Solid Status: > 8 hours  NPO Liquid Status: > 2 hours           Airway   Mallampati: I  TM distance: <3 FB  Neck ROM: full  Possible difficult intubation  Dental          Pulmonary     breath sounds clear to auscultation  (-) shortness of breath  Cardiovascular   Exercise tolerance: good (4-7 METS)    Rhythm: regular  Rate: normal    (+) hypertension, hyperlipidemia,   (-) angina, BARNETT      Neuro/Psych  GI/Hepatic/Renal/Endo    (+)   diabetes mellitus,     Musculoskeletal     Abdominal    Substance History      OB/GYN          Other   arthritis,                      Anesthesia Plan    ASA 3     general     intravenous induction     Anesthetic plan, all risks, benefits, and alternatives have been provided, discussed and informed consent has been obtained with: patient.

## 2021-02-12 NOTE — ANESTHESIA POSTPROCEDURE EVALUATION
"Patient: Jessica Barnes    Procedure Summary     Date: 02/12/21 Room / Location: [facility] OR  / [facility] MAIN OR    Anesthesia Start: 0109 Anesthesia Stop: 0349    Procedure: LAPAROTOMY EXPLORATORY FOR BOWEL OBSTRUCTION,LYSIS OF ADHESIONS,PRIMARY VENTRAL HERNIA REPAIR (N/A Abdomen) Diagnosis:     Surgeon: Alie Addison MD Provider: Bronson Raymundo MD    Anesthesia Type: general ASA Status: 3          Anesthesia Type: general    Vitals  Vitals Value Taken Time   /59 02/12/21 0500   Temp 37.1 °C (98.8 °F) 02/12/21 0344   Pulse 97 02/12/21 0507   Resp 14 02/12/21 0415   SpO2 94 % 02/12/21 0508   Vitals shown include unvalidated device data.        Post Anesthesia Care and Evaluation    Patient location during evaluation: bedside  Patient participation: complete - patient participated  Level of consciousness: awake and alert  Pain management: adequate  Airway patency: patent  Anesthetic complications: No anesthetic complications    Cardiovascular status: acceptable  Respiratory status: acceptable  Hydration status: acceptable    Comments: /68 (BP Location: Left arm, Patient Position: Lying)   Pulse 97   Temp 37.1 °C (98.8 °F) (Oral)   Resp 14   Ht 167.6 cm (66\")   Wt 102 kg (225 lb)   SpO2 96%   BMI 36.32 kg/m²       "

## 2021-02-12 NOTE — ED PROVIDER NOTES
EMERGENCY DEPARTMENT ENCOUNTER    Room Number:  10/10  Date seen:  2021  PCP: Kadeem Arteaga Jr., MD  Historian: Patient      HPI:  Chief Complaint: Vomiting  A complete HPI/ROS/PMH/PSH/SH/FH are unobtainable due to: Nothing  Context: Jessica Barnes is a 65 y.o. female who presents to the ED c/o multiple episodes of vomiting that began on  night and lasted several hours.  She reports that since then she is just had no appetite and has severe burning in her throat when she tries to swallow.  She reports that she has had normal bowel movements and has been passing gas.  She does have a ventral hernia which she has had for years.  She has had previous open cholecystectomy and prior .  She reports that she gets the symptoms of nausea and vomiting from time to time.  She denies fever or chills.  Currently she denies abdominal pain.  She has had no diarrhea.  No hematemesis, no melena, no bright red blood per rectum.  She otherwise has hypertension, diabetes.            PAST MEDICAL HISTORY  Active Ambulatory Problems     Diagnosis Date Noted   • No Active Ambulatory Problems     Resolved Ambulatory Problems     Diagnosis Date Noted   • No Resolved Ambulatory Problems     Past Medical History:   Diagnosis Date   • Cataract    • Diabetes mellitus (CMS/HCC)    • Hyperlipemia    • Hypertension    • Osteoarthritis of knee, unilateral    • PONV (postoperative nausea and vomiting)    • Torn ligament          PAST SURGICAL HISTORY  Past Surgical History:   Procedure Laterality Date   •  SECTION     • CHOLECYSTECTOMY     • KNEE ARTHROSCOPY           FAMILY HISTORY  History reviewed. No pertinent family history.      SOCIAL HISTORY  Social History     Socioeconomic History   • Marital status:      Spouse name: Not on file   • Number of children: Not on file   • Years of education: Not on file   • Highest education level: Not on file   Tobacco Use   • Smoking status: Never Smoker   • Smokeless  tobacco: Never Used   Substance and Sexual Activity   • Alcohol use: No   • Drug use: No         ALLERGIES  Latex, Penicillins, and Sulfa antibiotics        REVIEW OF SYSTEMS  Review of Systems   Review of all 14 systems is negative other than stated in the HPI above.      PHYSICAL EXAM  ED Triage Vitals   Temp Heart Rate Resp BP SpO2   02/11/21 1856 02/11/21 1856 02/11/21 1856 02/11/21 1947 02/11/21 1856   96 °F (35.6 °C) 109 18 130/59 97 %      Temp src Heart Rate Source Patient Position BP Location FiO2 (%)   -- -- -- -- --                GENERAL: Awake and alert, no acute distress  HENT: nares patent  EYES: no scleral icterus  CV: regular rhythm, normal rate  RESPIRATORY: normal effort  ABDOMEN: soft, nondistended throughout.  There is a ventral hernia palpable above the umbilicus that is not easily reducible but is nontender.  The overlying skin is nonerythematous.  MUSCULOSKELETAL: no deformity  NEURO: alert, moves all extremities, follows commands  PSYCH:  calm, cooperative  SKIN: warm, dry    Vital signs and nursing notes reviewed.          LAB RESULTS  Recent Results (from the past 24 hour(s))   Comprehensive Metabolic Panel    Collection Time: 02/11/21  7:55 PM    Specimen: Blood   Result Value Ref Range    Glucose 145 (H) 65 - 99 mg/dL    BUN 45 (H) 8 - 23 mg/dL    Creatinine 0.90 0.57 - 1.00 mg/dL    Sodium 137 136 - 145 mmol/L    Potassium 3.4 (L) 3.5 - 5.2 mmol/L    Chloride 89 (L) 98 - 107 mmol/L    CO2 26.1 22.0 - 29.0 mmol/L    Calcium 10.6 (H) 8.6 - 10.5 mg/dL    Total Protein 6.7 6.0 - 8.5 g/dL    Albumin 3.70 3.50 - 5.20 g/dL    ALT (SGPT) 5 1 - 33 U/L    AST (SGOT) 8 1 - 32 U/L    Alkaline Phosphatase 82 39 - 117 U/L    Total Bilirubin 0.4 0.0 - 1.2 mg/dL    eGFR Non African Amer 63 >60 mL/min/1.73    Globulin 3.0 gm/dL    A/G Ratio 1.2 g/dL    BUN/Creatinine Ratio 50.0 (H) 7.0 - 25.0    Anion Gap 21.9 (H) 5.0 - 15.0 mmol/L   Lipase    Collection Time: 02/11/21  7:55 PM    Specimen: Blood    Result Value Ref Range    Lipase 12 (L) 13 - 60 U/L   CBC Auto Differential    Collection Time: 02/11/21  7:55 PM    Specimen: Blood   Result Value Ref Range    WBC 21.63 (H) 3.40 - 10.80 10*3/mm3    RBC 5.51 (H) 3.77 - 5.28 10*6/mm3    Hemoglobin 14.8 12.0 - 15.9 g/dL    Hematocrit 46.1 34.0 - 46.6 %    MCV 83.7 79.0 - 97.0 fL    MCH 26.9 26.6 - 33.0 pg    MCHC 32.1 31.5 - 35.7 g/dL    RDW 13.9 12.3 - 15.4 %    RDW-SD 42.1 37.0 - 54.0 fl    MPV 10.0 6.0 - 12.0 fL    Platelets 376 140 - 450 10*3/mm3    Neutrophil % 77.7 (H) 42.7 - 76.0 %    Lymphocyte % 13.7 (L) 19.6 - 45.3 %    Monocyte % 6.2 5.0 - 12.0 %    Eosinophil % 0.6 0.3 - 6.2 %    Basophil % 0.5 0.0 - 1.5 %    Immature Grans % 1.3 (H) 0.0 - 0.5 %    Neutrophils, Absolute 16.81 (H) 1.70 - 7.00 10*3/mm3    Lymphocytes, Absolute 2.97 0.70 - 3.10 10*3/mm3    Monocytes, Absolute 1.34 (H) 0.10 - 0.90 10*3/mm3    Eosinophils, Absolute 0.12 0.00 - 0.40 10*3/mm3    Basophils, Absolute 0.10 0.00 - 0.20 10*3/mm3    Immature Grans, Absolute 0.29 (H) 0.00 - 0.05 10*3/mm3    nRBC 0.0 0.0 - 0.2 /100 WBC   Urinalysis With Microscopic If Indicated (No Culture) - Urine, Clean Catch    Collection Time: 02/11/21  8:51 PM    Specimen: Urine, Clean Catch   Result Value Ref Range    Color, UA Yellow Yellow, Straw    Appearance, UA Clear Clear    pH, UA <=5.0 5.0 - 8.0    Specific Gravity, UA 1.027 1.005 - 1.030    Glucose, UA >=1000 mg/dL (3+) (A) Negative    Ketones, UA 40 mg/dL (2+) (A) Negative    Bilirubin, UA Negative Negative    Blood, UA Negative Negative    Protein, UA Negative Negative    Leuk Esterase, UA Negative Negative    Nitrite, UA Negative Negative    Urobilinogen, UA 1.0 E.U./dL 0.2 - 1.0 E.U./dL       Ordered the above labs and reviewed the results.        RADIOLOGY  Ct Abdomen Pelvis With Contrast    Result Date: 2/11/2021  CT OF THE ABDOMEN AND PELVIS WITH CONTRAST  HISTORY: Abdominal pain and leukocytosis  COMPARISON: None available.  TECHNIQUE: Axial CT  imaging was obtained through the abdomen and pelvis. IV contrast was administered.  FINDINGS: Images through the lung bases demonstrate bibasilar scarring versus atelectasis. There are coronary artery calcifications. The stomach and duodenum are unremarkable, as are the adrenal glands. Gallbladder is surgically absent. No focal hepatic lesions are seen. The spleen is normal. Common bile duct is prominent, measuring 1.1 cm. Correlation with liver function tests is seen, although this may be postcholecystectomy nature. Pancreas is atrophic. The kidneys enhance symmetrically. There is no hydronephrosis. There are bilateral simple appearing renal cysts. No additional follow-up is necessary. The larger is on the right kidney, and measures 5.7 x 5.5 cm. No distal ureteral or bladder stones are seen. Urinary bladder is normal. Uterus is probably within normal limits for 65-year-old woman. The colon is decompressed. There is colonic diverticulosis. There is no diverticulitis. Multiple dilated and fluid-filled loops of small bowel are seen. Transition point appears to be within a hernia sac within the right lower quadrant. The patient has a second bowel containing hernia sac is noted immediately cranial to the hernia sac obtaining obstructed bowel, but there is no evidence of obstruction related to the more superior hernia sac. The defect for the more superior hernia sac measures up to 2.9 cm. The defect more inferiorly measures 2.4 cm. There is inflammatory stranding seen within the inferior hernia sac, and there is significant bowel wall thickening within the affected loop of bowel. There is fluid seen within both hernia sacs. There is also some trace free fluid seen surrounding the liver, and tracking along the right paracolic gutter. No pneumatosis or free air is seen at this time. Patient does appear to have some inflammatory change within the right paramedian anterior abdominal wall. No acute osseous abnormalities are  seen.       1. Small bowel obstruction, which appears to be related to a right lower quadrant ventral hernia. The patient actually has 2 bowel containing control hernia sacs immediately adjacent to one another within the right lower quadrant, with the more inferior sac containing the obstructed loop of bowel. There is bowel wall thickening involving the obstructed bowel, and there is adjacent stranding and trace free fluid. Additional fluid is seen within the more superior hernia sac, as well as adjacent to the liver and tracking along the right paracolic gutter. Additional small amount of free fluid is present within the pelvis. No pneumatosis or free air is seen at this time.  Radiation dose reduction techniques were utilized, including automated exposure control and exposure modulation based on body size.  This report was finalized on 2/11/2021 10:33 PM by Dr. Luisa Powell M.D.        Ordered the above noted radiological studies. Reviewed by me in PACS.            PROCEDURES  Procedures              MEDICATIONS GIVEN IN ER  Medications   sodium chloride 0.9 % flush 10 mL (10 mL Intravenous Given 2/11/21 1956)   aluminum-magnesium hydroxide-simethicone (MAALOX MAX) 400-400-40 MG/5ML suspension 15 mL (15 mL Oral Given 2/11/21 2212)   Lidocaine Viscous HCl (XYLOCAINE) 2 % mouth solution 15 mL (15 mL Mouth/Throat Given 2/11/21 2212)   iopamidol (ISOVUE-300) 61 % injection 100 mL (85 mL Intravenous Given by Other 2/11/21 2158)                   MEDICAL DECISION MAKING, PROGRESS, and CONSULTS    All labs have been independently reviewed by me.  All radiology studies have been reviewed by me and discussed with radiologist dictating the report.   EKG's independently viewed and interpreted by me.  Discussion below represents my analysis of pertinent findings related to patient's condition, differential diagnosis, treatment plan and final disposition.    Differential diagnosis includes but is not limited to  pancreatitis, gastroenteritis, colitis, diverticulitis, small bowel obstruction, incarcerated hernia.    ED Course as of Feb 11 2328   Thu Feb 11, 2021 2115 Glucose(!): >=1000 mg/dL (3+) [JR]   2115 Ketones, UA(!): 40 mg/dL (2+) [JR]   2115 Glucose(!): 145 [JR]   2115 CO2: 26.1 [JR]   2115 Calcium(!): 10.6 [JR]   2115 Lipase(!): 12 [JR]   2115 WBC(!): 21.63 [JR]   2115 Hemoglobin: 14.8 [JR]   2115 Hematocrit: 46.1 [JR]   2246 Patient and family informed of findings of small bowel obstruction related to ventral hernia and plan for admission for further management.    [JR]   2314 Discussed with Dr. Addison, general surgery, who agrees to admit and requested nasogastric tube placement.    [JR]      ED Course User Index  [JR] Patrick Ortiz MD              I wore an N95 mask, face shield, and gloves during this patient encounter.  Patient also wearing a surgical mask.  Hand hygeine performed before and after seeing the patient.    DIAGNOSIS  Final diagnoses:   Small bowel obstruction (CMS/HCC)   Ventral hernia with bowel obstruction         DISPOSITION  ADMIT            Latest Documented Vital Signs:  As of 23:28 EST  BP- 142/48 HR- 89 Temp- 96 °F (35.6 °C) O2 sat- 94%        --    Please note that portions of this were completed with a voice recognition program.          Patrick Ortiz MD  02/11/21 2335

## 2021-02-12 NOTE — PROGRESS NOTES
Discharge Planning Assessment  [facility]     Patient Name: Jessica Barnes  MRN: [id number]  Today's Date: 2/12/2021    Admit Date: 2/11/2021    Discharge Needs Assessment     Row Name 02/12/21 1019       Living Environment    Lives With  spouse    Name(s) of Who Lives With Patient  Spouse: Mati Barnes    Current Living Arrangements  home/apartment/condo    Primary Care Provided by  self    Provides Primary Care For  no one    Family Caregiver if Needed  child(darryl), adult;spouse    Quality of Family Relationships  helpful;involved;supportive    Able to Return to Prior Arrangements  yes       Resource/Environmental Concerns    Resource/Environmental Concerns  none       Transition Planning    Patient/Family Anticipates Transition to  home with family    Patient/Family Anticipated Services at Transition  none    Transportation Anticipated  family or friend will provide       Discharge Needs Assessment    Equipment Currently Used at Home  glucometer    Equipment Needed After Discharge  none    Provided Post Acute Provider List?  N/A    N/A Provider List Comment  Denies needs. Plan is home        Discharge Plan     Row Name 02/12/21 1019       Plan    Plan  Home no needs    Patient/Family in Agreement with Plan  yes    Plan Comments  IMM noted. Introduced self and role of CCP. Facesheet verified. Patient lives with spouse, Mati Barnes in a one story house. There are 3 steps to enter. There are no steps once inside. Prior to admission, patient was independent with ADL's and continued to drive. Uses glucometer and obtains supplies at [facility]. Denies any supply needs. Has never used a HH agency nor been to a rehab facility. DC plan is home. Denies any needs/equipment.        Continued Care and Services - Admitted Since 2/11/2021    Coordination has not been started for this encounter.         Demographic Summary     Row Name 02/12/21 1018       General Information    Admission Type  inpatient    Arrived From  home    Required  Notices Provided  Important Message from Medicare    Reason for Consult  discharge planning    Preferred Language  English     Used During This Interaction  no        Functional Status     Row Name 02/12/21 1018       Functional Status    Usual Activity Tolerance  good    Current Activity Tolerance  good       Functional Status, IADL    Medications  independent    Meal Preparation  independent    Housekeeping  independent    Laundry  independent    Shopping  independent       Mental Status    General Appearance WDL  WDL       Employment/    Employment Status  retired        Psychosocial     Row Name 02/12/21 1018       Values/Beliefs    Spiritual, Cultural Beliefs, Mosque Practices, Values that Affect Care  no       Behavior WDL    Behavior WDL  WDL       Emotion Mood WDL    Emotion/Mood/Affect WDL  WDL       Speech WDL    Speech WDL  WDL       Perceptual State WDL    Perceptual State WDL  WDL       Intellectual Performance WDL    Level of Consciousness  Alert       Coping/Stress    Sources of Support  adult child(darryl);spouse    Reaction to Health Status  adjusting    Understanding of Condition and Treatment  adequate understanding of treatment       Developmental Stage (Eriksson's)    Developmental Stage  Stage 8 (64 years-death/Late Adulthood) Integrity vs. Despair        Abuse/Neglect     Row Name 02/12/21 1018       Personal Safety    Feels Unsafe at Home or Work/School  no    Feels Threatened by Someone  no    Does Anyone Try to Keep You From Having Contact with Others or Doing Things Outside Your Home?  no    Physical Signs of Abuse Present  no        Darcie Spencer, RN

## 2021-02-12 NOTE — BRIEF OP NOTE
LAPAROTOMY EXPLORATORY  Progress Note    Jessica Barnes  2/12/2021    Pre-op Diagnosis:   Closed loop bowel obstruction in ventral hernia       Post-Op Diagnosis Codes:   Same; necrotic omentum in hernia sac    Procedure/CPT® Codes:        Procedure(s):  LAPAROTOMY EXPLORATORY FOR BOWEL OBSTRUCTION,LYSIS OF ADHESIONS,PRIMARY VENTRAL HERNIA REPAIR    Surgeon(s):  Alie Addison MD    Anesthesia: General    Staff:   Circulator: Nelda Gifford RN  Scrub Person: Jacinto Jackson  Assistant: Milton Moreland CSA  Assistant: Milton Moreland CSA      Estimated Blood Loss: 100 mL    Urine Voided: 140 mL    Specimens:                          Drains:   Closed/Suction Drain 1 RUQ Bulb 19 Fr. (Active)       NG/OG Tube Nasogastric 18 Fr Right nostril (Active)   Site Assessment Clean;Dry;Intact 02/12/21 0042   Securement taped to nostril center 02/12/21 0042   Secured at (cm) 55 02/12/21 0042   NG Site Interventions Site assessed 02/12/21 0042   Dressing Intervention New dressing 02/11/21 2322   Status Clamped 02/12/21 0042   Drainage Appearance Brown 02/11/21 2322   Output (mL) 100 mL 02/11/21 2322       Urethral Catheter Double-lumen;Silicone 16 Fr. (Active)       Findings: viable but inflamed small bowel, necrotic omentum, two hernia defects ~2-3cm in size    Complications: none immediately evident     Assistant: Milton Moreland CSA  was responsible for performing the following activities: Retraction, Suction, Irrigation, Suturing, Closing and Placing Dressing and their skilled assistance was necessary for the success of this case.    Alie Addison MD     Date: 2/12/2021  Time: 03:41 EST

## 2021-02-12 NOTE — PLAN OF CARE
Goal Outcome Evaluation:     Progress: improving  Outcome Summary: VSS. Replacing potassium, 2/4 have been infused. Will infuse 2 more during shift. Tolerating ice chips. NG to LWS. Pain controlled with dilauded. Ana Cristina to BSD, possibly to be d/c tomorrow. Up to chair for a few hours. SCDs, IS at bedside.

## 2021-02-12 NOTE — OP NOTE
OPERATIVE REPORT    DATE: 2/12/2021    SURGEON: MD Milton Mccabe     OPERATION PERFORMED: exploratory laparotomy, lysis of adhesions, primary ventral hernia repair x2    PREOPERATIVE DIAGNOSIS: Closed-loop bowel obstruction in a known ventral hernia    POSTOPERATIVE DIAGNOSIS: Same, infarcted omentum    ANESTHESIA: General endotracheal anesthesia    SPECIMEN: Hernia sac    DRAINS: 19 Stateless HANNAH drain    BLOOD LOSS: 100 cc    INDICATION FOR OPERATION: Mrs. Jessica Barnes is a 65 y.o. year old lady who presented to the emergency room with a 4-day history of abdominal pain, nausea, and vomiting.  CT scan was concerning for closed-loop bowel obstruction and a known ventral hernia.  She had a elevated white count of 21,000 as well as stranding and inflammation in the hernia sac.  Exploration with lysis of adhesions was recommended.  All risks (including bleeding, infection, damage to surrounding structures, hernia recurrence), benefits and alternatives were explained to the patient who agreed and wished to proceed. Informed consent was signed.     OPERATIVE COURSE: The patient was taken to the operating room, transferred onto the operating room table, and underwent general endotracheal anesthesia without incident.  De La Paz catheter was placed.  The patient was prepped and draped in sterile fashion. A time out was performed and preoperative antibiotics were given.  A midline incision was made around the area of the umbilicus.  We first turned our attention to the umbilical hernia as that is where the obstruction appeared to be.  We slowly dissected the hernia sac free from the surrounding subcutaneous tissue.  We then dissected it free from the surrounding fascia.  Upon entering the hernia sac, there was necrotic dead omentum.  This was amputated and sent off the specimen with hernia sac.  There was one knuckle of bowel similar to what was seen on the CT scan that was in the hernia sac incarcerated.  It was  hyperemic but viable.  The hernia defect itself was approximately 2 cm in size.  There was another hernia defect with a large sac just superior and to the right of midline of this.  We connected these 2 defects.  The other hernia sac was also slowly dissected out with Bovie electrocautery and blunt dissection.  It was  from the surrounding subcutaneous tissue.  We then dissected it free from the fascia.  The hernia sac was excised.  There were several loops of bowel with inflammatory fluid in the hernia sac.  We lysed adhesions between these loops of bowel.  Once this was done, we ran the entirety of the small bowel from the ligament of Treitz to the cecum.  There were no further abnormalities.  There were no deserosalization's bowel injuries noted.  The NG tube placement was checked and confirmed.  The bowel was replaced into the abdomen and the omentum was pulled down over it.  The fascia was closed with figure-of-eight interrupted 0 PDS suture.  A 19 Chadian Judah drain was placed in the subcutaneous space due to the large defect from the hernias.  Skin was closed with interrupted 3 oh deep dermal sutures and skin staples.All needle and lap counts were correct at the end of the case. The patient was then awoken from anesthesia and taken to recovery for further monitoring.       Alie Addison MD   General and Endoscopic Surgery  [facility]    [address], [address]  [place], [place], [address]  P: [phone]  F: [phone]

## 2021-02-12 NOTE — PLAN OF CARE
Problem: Adult Inpatient Plan of Care  Goal: Plan of Care Review  Outcome: Ongoing, Progressing  Flowsheets (Taken 2/12/2021 0832)  Plan of Care Reviewed With: patient  Outcome Summary: received from PACU awake and alert, pain at a 6/10, with NGT attached to cont. LWS, very scant brownish gastric content noted, vertical incision with island dressing noted, HANNAH to RLQ with small serous sanguinous output, anne intact, on 2 L of O2, medicated with Zofran for nausea and Dilaudid for pain, blood glucose monitored, deep breathing encouraged, tachycardic, IVF infusing   Goal Outcome Evaluation:  Plan of Care Reviewed With: patient     Outcome Summary: received from PACU awake and alert, pain at a 6/10, with NGT attached to cont. LWS, very scant brownish gastric content noted, vertical incision with island dressing noted, HANNAH to RLQ with small serous sanguinous output, anne intact, on 2 L of O2, medicated with Zofran for nausea and Dilaudid for pain, blood glucose monitored, deep breathing encouraged, tachycardic, IVF infusing

## 2021-02-12 NOTE — ED NOTES
I wore full protective equipment throughout this patient encounter including a face mask, goggles, and gloves. Hand hygiene was performed before donning protective equipment and after removal when leaving the room.       Nayana Correa RN  02/11/21 4724

## 2021-02-12 NOTE — H&P
General Surgery history and physical exam    Summary:    Mrs. Jessica Barnes is a 65 y.o. year old with a closed-loop bowel obstruction and an her inferior ventral hernia.  Knuckle of small bowel in the inferior hernia with inflammation, stranding, and free fluid.  This in the setting of a white blood cell count of 21 is concerning for bowel compromise or developing compromise.  NG tube was placed in the ER.  Recommend proceeding to the operating room for exploratory laparotomy, possible small bowel resection, primary repair of ventral hernia.  All risks (including bleeding, infection, damage to surrounding structures, hernia recurrence), benefits, and alternatives were explained to the patient and she agreed and wished to proceed.    Chief Complaint:    Abdominal pain, nausea, vomiting    History of Present Illness:    Mrs. Jessica Barnes is a 65 y.o. year old lady who has a 4-day history of nausea and vomiting that is worsened over time.  She has had intermittent abdominal pain.  She has been having bowel movements but has not been able to tolerate p.o.  She also complains of reflux and burning in her throat.  Denies fever, chills, sick contacts.  She is known she has had a ventral hernia for several years but is not ever pursued repair.    Past Medical History:   • Diabetes mellitus  • Hypertension  • Hyperlipidemia    Past Surgical History:    •   • Open cholecystectomy  • Left knee arthroscopy    Family History:    • No family history of colon cancer    Social History:    • Denies tobacco use  • Denies alcohol use    Allergies:   Allergies   Allergen Reactions   • Latex Other (See Comments)     BLISTERS ON ARM   • Penicillins Other (See Comments)     UNKNOWN   • Sulfa Antibiotics Hives       Medications:     Current Facility-Administered Medications:   •  [COMPLETED] Insert peripheral IV, , , Once **AND** sodium chloride 0.9 % flush 10 mL, 10 mL, Intravenous, PRN, Patrick Ortiz MD, 10 mL at 21  1957    Current Outpatient Medications:   •  amLODIPine (NORVASC) 10 MG tablet, Take 10 mg by mouth., Disp: , Rfl:   •  atorvastatin (LIPITOR) 10 MG tablet, Take 10 mg by mouth Daily., Disp: , Rfl:   •  benazepril (LOTENSIN) 20 MG tablet, Take 40 mg by mouth Daily., Disp: , Rfl:   •  Empagliflozin (Jardiance) 25 MG tablet, Take  by mouth., Disp: , Rfl:   •  Ergocalciferol (VITAMIN D2 PO), Take  by mouth 1 (One) Time Per Week., Disp: , Rfl:   •  hydroCHLOROthiazide (HYDRODIURIL) 50 MG tablet, Take 50 mg by mouth Daily., Disp: , Rfl:   •  Insulin Glargine (TOUJEO SOLOSTAR SC), Inject 100 Units under the skin into the appropriate area as directed Every Night., Disp: , Rfl:   •  insulin lispro (humaLOG, ADMELOG) 100 UNIT/ML injection, Inject 15 Units under the skin into the appropriate area as directed 2 (two) times a day., Disp: , Rfl:     Radiology/Endoscopy:    • CT scan reviewed; 2 ventral hernias, superior 1 with decompressed distal loops of bowel, inferior 1 with knuckle of bowel in a closed-loop obstruction, free fluid and stranding around this loop of bowel    Labs:    • White blood cell count 21 hemoglobin 14.8 platelets 376 creatinine 0.9 bicarb 26    Review of Systems:   Influenza-like illness: no fever, no  cough, no  sore throat, no  body aches, no loss of sense of taste or smell, no known exposure to person with Covid-19.  Constitutional: Negative for fevers or chills  HENT: Negative for hearing loss or runny nose  Eyes: Negative for vision changes or scleral icterus  Respiratory: Negative for cough or shortness of breath  Cardiovascular: Negative for chest pain or heart palpitations  Gastrointestinal: Positive for abdominal pain, nausea, vomiting; negative for constipation, melena, or hematochezia  Genitourinary: Negative for hematuria or dysuria  Musculoskeletal: Negative for joint swelling or gait instability  Neurologic: Negative for tremors or seizures  Psychiatric: Negative for suicidal ideations or  depression  All other systems reviewed and negative    Physical Exam:   • Constitutional: Well-developed, well-nourished, no acute distress, BMI 36.3  • Vital signs: Temperature 96 heart rate 91 respiratory rate 18 blood pressure 138/53  • Eyes: Conjunctiva normal, sclera nonicteric  • ENMT: Hearing grossly normal, oral mucosa moist  • Neck: Supple, trachea midline  • Respiratory: On room air, no increased work of breathing, normal inspiratory effort  • Cardiovascular: Mild tachycardia, regular rhythm no peripheral edema, no jugular venous distention  • Gastrointestinal: Soft, minimally tender to palpation over her midline hernia, nonreducible midline hernia with bowel in its contents  • Skin:  Warm, dry, no rash on visualized skin surfaces  • Musculoskeletal: Symmetric strength, normal gait  • Psychiatric: Alert and oriented ×3, normal affect     TRIXIE NICHOLS M.D.  General and Endoscopic Surgery  [facility]    [address], [address]  [place], [place], [address]  P: [phone]  F: [phone]

## 2021-02-12 NOTE — PROGRESS NOTES
"General Surgery Progress Note    Summary:  Mrs. Jessica Barnes is a 65 y.o. year old lady POD1 s/p exploratory laparotomy, lysis of adhesions, primary ventral hernia repair x2 for a closed loop small bowel obstruction in a known ventral hernia.  Continue NG today, NPO with ice chips. OOB as tolerated. Will consult PT. Lovenox this evening.     Interval Events: No acute events overnight.  Pain fairly well controlled with IV pain medication.  Has a sore throat. Not OOB yet.    Vitals: /52 (BP Location: Left arm, Patient Position: Lying)   Pulse 105   Temp 97.9 °F (36.6 °C) (Oral)   Resp 16   Ht 167.6 cm (66\")   Wt 102 kg (225 lb)   SpO2 94%   BMI 36.32 kg/m²     Intake & Output:  UOP: 540 cc/24 hrs  NG with 350 cc    GENERAL: alert, well appearing, and in no distress  HEENT: normocephalic, atraumatic, moist mucous membranes, clear sclerae, NG with 350 cc thin brown liquid  CHEST: 4L NC, symmetric air entry  CARDIAC: , regular rhythm    ABDOMEN: soft, dressing clean and dry, HANNAH drain serosanguineous  EXTREMITIES: no cyanosis, clubbing, or edema   SKIN: Warm and moist, no rashes    Labs:  Results from last 7 days   Lab Units 02/12/21  0543 02/11/21 1955   WBC 10*3/mm3 16.26* 21.63*   HEMOGLOBIN g/dL 11.7* 14.8   HEMATOCRIT % 34.9 46.1   PLATELETS 10*3/mm3 242 376     Results from last 7 days   Lab Units 02/12/21  0543 02/11/21 1955   SODIUM mmol/L 134* 137   POTASSIUM mmol/L 3.2* 3.4*   CHLORIDE mmol/L 93* 89*   CO2 mmol/L 23.3 26.1   BUN mg/dL 44* 45*   CREATININE mg/dL 0.75 0.90   CALCIUM mg/dL 9.3 10.6*   BILIRUBIN mg/dL  --  0.4   ALK PHOS U/L  --  82   ALT (SGPT) U/L  --  5   AST (SGOT) U/L  --  8   GLUCOSE mg/dL 166* 145*           TRIXIE NICHOLS MD  General and Endoscopic Surgery  [facility]    [address], [address]  [place], [place], [address]  P: [phone]  F: [phone]     "

## 2021-02-12 NOTE — ED NOTES
"Nursing report ED to floor  Jessica Barnes  65 y.o.  female    HPI (triage note):   Chief Complaint   Patient presents with   • Abdominal Pain       Admitting doctor:   Alie Addison MD    Admitting diagnosis:   The primary encounter diagnosis was Small bowel obstruction (CMS/HCC). A diagnosis of Ventral hernia with bowel obstruction was also pertinent to this visit.    Code status:   Current Code Status     Date Active Code Status Order ID Comments User Context       Not on file    Advance Care Planning Activity          Allergies:   Latex, Penicillins, and Sulfa antibiotics    Weight:       02/11/21 2052   Weight: 102 kg (225 lb)       Most recent vitals:   Vitals:    02/11/21 2052 02/11/21 2130 02/11/21 2300 02/11/21 2330   BP:  144/53 142/48 133/57   Pulse:  96 89 90   Resp:       Temp:       SpO2:  94%  93%   Weight: 102 kg (225 lb)      Height: 167.6 cm (66\")          Active LDAs/IV Access:   Lines, Drains & Airways    Active LDAs     Name:   Placement date:   Placement time:   Site:   Days:    Peripheral IV 02/11/21 1953 Right Antecubital   02/11/21 1953    Antecubital   less than 1    NG/OG Tube Nasogastric 18 Fr Right nostril   02/11/21 2322    Right nostril   less than 1                Labs (abnormal labs have a star):   Labs Reviewed   COMPREHENSIVE METABOLIC PANEL - Abnormal; Notable for the following components:       Result Value    Glucose 145 (*)     BUN 45 (*)     Potassium 3.4 (*)     Chloride 89 (*)     Calcium 10.6 (*)     BUN/Creatinine Ratio 50.0 (*)     Anion Gap 21.9 (*)     All other components within normal limits    Narrative:     GFR Normal >60  Chronic Kidney Disease <60  Kidney Failure <15     LIPASE - Abnormal; Notable for the following components:    Lipase 12 (*)     All other components within normal limits   URINALYSIS W/ MICROSCOPIC IF INDICATED (NO CULTURE) - Abnormal; Notable for the following components:    Glucose, UA >=1000 mg/dL (3+) (*)     Ketones, UA 40 mg/dL (2+) " (*)     All other components within normal limits    Narrative:     Urine microscopic not indicated.   CBC WITH AUTO DIFFERENTIAL - Abnormal; Notable for the following components:    WBC 21.63 (*)     RBC 5.51 (*)     Neutrophil % 77.7 (*)     Lymphocyte % 13.7 (*)     Immature Grans % 1.3 (*)     Neutrophils, Absolute 16.81 (*)     Monocytes, Absolute 1.34 (*)     Immature Grans, Absolute 0.29 (*)     All other components within normal limits   COVID PRE-OP / PRE-PROCEDURE SCREENING ORDER (NO ISOLATION)    Narrative:     The following orders were created for panel order COVID PRE-OP / PRE-PROCEDURE SCREENING ORDER (NO ISOLATION) - Swab, Nasopharynx.  Procedure                               Abnormality         Status                     ---------                               -----------         ------                     COVID-19, TIA IN-HOUSE...[[id number]]                      In process                   Please view results for these tests on the individual orders.   COVID-19, TIA IN-HOUSE CEPHEID, NP SWAB IN TRANSPORT MEDIA 8-12 HR TAT   CBC AND DIFFERENTIAL    Narrative:     The following orders were created for panel order CBC & Differential.  Procedure                               Abnormality         Status                     ---------                               -----------         ------                     CBC Auto Differential[[id number]]        Abnormal            Final result                 Please view results for these tests on the individual orders.       EKG:   No orders to display       Meds given in ED:   Medications   sodium chloride 0.9 % flush 10 mL (10 mL Intravenous Given 2/11/21 1956)   aluminum-magnesium hydroxide-simethicone (MAALOX MAX) 400-400-40 MG/5ML suspension 15 mL (15 mL Oral Given 2/11/21 2212)   Lidocaine Viscous HCl (XYLOCAINE) 2 % mouth solution 15 mL (15 mL Mouth/Throat Given 2/11/21 2212)   iopamidol (ISOVUE-300) 61 % injection 100 mL (85 mL Intravenous Given by Other  2/11/21 6852)       Imaging results:  Ct Abdomen Pelvis With Contrast    Result Date: 2/11/2021   1. Small bowel obstruction, which appears to be related to a right lower quadrant ventral hernia. The patient actually has 2 bowel containing control hernia sacs immediately adjacent to one another within the right lower quadrant, with the more inferior sac containing the obstructed loop of bowel. There is bowel wall thickening involving the obstructed bowel, and there is adjacent stranding and trace free fluid. Additional fluid is seen within the more superior hernia sac, as well as adjacent to the liver and tracking along the right paracolic gutter. Additional small amount of free fluid is present within the pelvis. No pneumatosis or free air is seen at this time.  Radiation dose reduction techniques were utilized, including automated exposure control and exposure modulation based on body size.  This report was finalized on 2/11/2021 10:33 PM by Dr. Luisa Powell M.D.        Ambulatory status:   - bedrest    Social issues:   Social History     Socioeconomic History   • Marital status:      Spouse name: Not on file   • Number of children: Not on file   • Years of education: Not on file   • Highest education level: Not on file   Tobacco Use   • Smoking status: Never Smoker   • Smokeless tobacco: Never Used   Substance and Sexual Activity   • Alcohol use: No   • Drug use: No    Nursing report ED to floor       Nayana Correa RN  02/11/21 8172

## 2021-02-12 NOTE — ANESTHESIA PROCEDURE NOTES
Airway  Urgency: elective    Date/Time: 2/12/2021 1:18 AM  Airway not difficult (anterior slightly)    General Information and Staff    Patient location during procedure: OR  Anesthesiologist: Bronson Raymundo MD  CRNA: Guadalupe Beht CRNA    Indications and Patient Condition  Indications for airway management: airway protection    Preoxygenated: yes  Mask difficulty assessment: 0 - not attempted    Final Airway Details  Final airway type: endotracheal airway      Successful airway: ETT  Cuffed: yes   Successful intubation technique: direct laryngoscopy and RSI  Facilitating devices/methods: intubating stylet and cricoid pressure  Endotracheal tube insertion site: oral  Blade: Retana  Blade size: 2  ETT size (mm): 7.0  Cormack-Lehane Classification: grade IIa - partial view of glottis  Placement verified by: chest auscultation and capnometry   Cuff volume (mL): 7  Measured from: teeth  ETT/EBT  to teeth (cm): 20  Number of attempts at approach: 1  Assessment: lips, teeth, and gum same as pre-op and atraumatic intubation

## 2021-02-13 LAB
ANION GAP SERPL CALCULATED.3IONS-SCNC: 12.4 MMOL/L (ref 5–15)
BASOPHILS # BLD AUTO: 0.04 10*3/MM3 (ref 0–0.2)
BASOPHILS NFR BLD AUTO: 0.4 % (ref 0–1.5)
BUN SERPL-MCNC: 25 MG/DL (ref 8–23)
BUN/CREAT SERPL: 43.9 (ref 7–25)
CALCIUM SPEC-SCNC: 9.2 MG/DL (ref 8.6–10.5)
CHLORIDE SERPL-SCNC: 100 MMOL/L (ref 98–107)
CO2 SERPL-SCNC: 24.6 MMOL/L (ref 22–29)
CREAT SERPL-MCNC: 0.57 MG/DL (ref 0.57–1)
CYTO UR: NORMAL
DEPRECATED RDW RBC AUTO: 42 FL (ref 37–54)
EOSINOPHIL # BLD AUTO: 0.12 10*3/MM3 (ref 0–0.4)
EOSINOPHIL NFR BLD AUTO: 1.1 % (ref 0.3–6.2)
ERYTHROCYTE [DISTWIDTH] IN BLOOD BY AUTOMATED COUNT: 13.6 % (ref 12.3–15.4)
GFR SERPL CREATININE-BSD FRML MDRD: 106 ML/MIN/1.73
GLUCOSE BLDC GLUCOMTR-MCNC: 151 MG/DL (ref 70–130)
GLUCOSE BLDC GLUCOMTR-MCNC: 158 MG/DL (ref 70–130)
GLUCOSE BLDC GLUCOMTR-MCNC: 162 MG/DL (ref 70–130)
GLUCOSE BLDC GLUCOMTR-MCNC: 168 MG/DL (ref 70–130)
GLUCOSE BLDC GLUCOMTR-MCNC: 178 MG/DL (ref 70–130)
GLUCOSE SERPL-MCNC: 163 MG/DL (ref 65–99)
HCT VFR BLD AUTO: 34.5 % (ref 34–46.6)
HGB BLD-MCNC: 11.4 G/DL (ref 12–15.9)
IMM GRANULOCYTES # BLD AUTO: 0.14 10*3/MM3 (ref 0–0.05)
IMM GRANULOCYTES NFR BLD AUTO: 1.2 % (ref 0–0.5)
LAB AP CASE REPORT: NORMAL
LYMPHOCYTES # BLD AUTO: 1.75 10*3/MM3 (ref 0.7–3.1)
LYMPHOCYTES NFR BLD AUTO: 15.4 % (ref 19.6–45.3)
MAGNESIUM SERPL-MCNC: 1.6 MG/DL (ref 1.6–2.4)
MCH RBC QN AUTO: 27.9 PG (ref 26.6–33)
MCHC RBC AUTO-ENTMCNC: 33 G/DL (ref 31.5–35.7)
MCV RBC AUTO: 84.6 FL (ref 79–97)
MONOCYTES # BLD AUTO: 0.78 10*3/MM3 (ref 0.1–0.9)
MONOCYTES NFR BLD AUTO: 6.8 % (ref 5–12)
NEUTROPHILS NFR BLD AUTO: 75.1 % (ref 42.7–76)
NEUTROPHILS NFR BLD AUTO: 8.57 10*3/MM3 (ref 1.7–7)
NRBC BLD AUTO-RTO: 0 /100 WBC (ref 0–0.2)
PATH REPORT.FINAL DX SPEC: NORMAL
PATH REPORT.GROSS SPEC: NORMAL
PLATELET # BLD AUTO: 234 10*3/MM3 (ref 140–450)
PMV BLD AUTO: 10.1 FL (ref 6–12)
POTASSIUM SERPL-SCNC: 3.2 MMOL/L (ref 3.5–5.2)
POTASSIUM SERPL-SCNC: 3.7 MMOL/L (ref 3.5–5.2)
RBC # BLD AUTO: 4.08 10*6/MM3 (ref 3.77–5.28)
SODIUM SERPL-SCNC: 137 MMOL/L (ref 136–145)
WBC # BLD AUTO: 11.4 10*3/MM3 (ref 3.4–10.8)

## 2021-02-13 PROCEDURE — 63710000001 INSULIN REGULAR HUMAN PER 5 UNITS: Performed by: STUDENT IN AN ORGANIZED HEALTH CARE EDUCATION/TRAINING PROGRAM

## 2021-02-13 PROCEDURE — 97110 THERAPEUTIC EXERCISES: CPT

## 2021-02-13 PROCEDURE — 99024 POSTOP FOLLOW-UP VISIT: CPT | Performed by: STUDENT IN AN ORGANIZED HEALTH CARE EDUCATION/TRAINING PROGRAM

## 2021-02-13 PROCEDURE — 25010000003 POTASSIUM CHLORIDE 10 MEQ/100ML SOLUTION: Performed by: STUDENT IN AN ORGANIZED HEALTH CARE EDUCATION/TRAINING PROGRAM

## 2021-02-13 PROCEDURE — 80048 BASIC METABOLIC PNL TOTAL CA: CPT | Performed by: STUDENT IN AN ORGANIZED HEALTH CARE EDUCATION/TRAINING PROGRAM

## 2021-02-13 PROCEDURE — 83735 ASSAY OF MAGNESIUM: CPT | Performed by: STUDENT IN AN ORGANIZED HEALTH CARE EDUCATION/TRAINING PROGRAM

## 2021-02-13 PROCEDURE — 25010000002 HYDROMORPHONE PER 4 MG: Performed by: STUDENT IN AN ORGANIZED HEALTH CARE EDUCATION/TRAINING PROGRAM

## 2021-02-13 PROCEDURE — 97161 PT EVAL LOW COMPLEX 20 MIN: CPT

## 2021-02-13 PROCEDURE — 25010000002 ONDANSETRON PER 1 MG: Performed by: STUDENT IN AN ORGANIZED HEALTH CARE EDUCATION/TRAINING PROGRAM

## 2021-02-13 PROCEDURE — 84132 ASSAY OF SERUM POTASSIUM: CPT | Performed by: STUDENT IN AN ORGANIZED HEALTH CARE EDUCATION/TRAINING PROGRAM

## 2021-02-13 PROCEDURE — 85025 COMPLETE CBC W/AUTO DIFF WBC: CPT | Performed by: STUDENT IN AN ORGANIZED HEALTH CARE EDUCATION/TRAINING PROGRAM

## 2021-02-13 PROCEDURE — 82962 GLUCOSE BLOOD TEST: CPT

## 2021-02-13 PROCEDURE — 25010000002 ENOXAPARIN PER 10 MG: Performed by: STUDENT IN AN ORGANIZED HEALTH CARE EDUCATION/TRAINING PROGRAM

## 2021-02-13 RX ORDER — HYDROMORPHONE HYDROCHLORIDE 1 MG/ML
0.5 INJECTION, SOLUTION INTRAMUSCULAR; INTRAVENOUS; SUBCUTANEOUS
Status: DISCONTINUED | OUTPATIENT
Start: 2021-02-13 | End: 2021-02-17 | Stop reason: HOSPADM

## 2021-02-13 RX ORDER — POLYETHYLENE GLYCOL 3350 17 G/17G
17 POWDER, FOR SOLUTION ORAL DAILY
Status: DISCONTINUED | OUTPATIENT
Start: 2021-02-13 | End: 2021-02-17 | Stop reason: HOSPADM

## 2021-02-13 RX ADMIN — POTASSIUM CHLORIDE 10 MEQ: 7.46 INJECTION, SOLUTION INTRAVENOUS at 09:09

## 2021-02-13 RX ADMIN — INSULIN HUMAN 2 UNITS: 100 INJECTION, SOLUTION PARENTERAL at 06:59

## 2021-02-13 RX ADMIN — INSULIN HUMAN 2 UNITS: 100 INJECTION, SOLUTION PARENTERAL at 18:00

## 2021-02-13 RX ADMIN — INSULIN HUMAN 2 UNITS: 100 INJECTION, SOLUTION PARENTERAL at 01:02

## 2021-02-13 RX ADMIN — POTASSIUM CHLORIDE 10 MEQ: 7.46 INJECTION, SOLUTION INTRAVENOUS at 12:19

## 2021-02-13 RX ADMIN — HYDROMORPHONE HYDROCHLORIDE 0.5 MG: 1 INJECTION, SOLUTION INTRAMUSCULAR; INTRAVENOUS; SUBCUTANEOUS at 14:00

## 2021-02-13 RX ADMIN — POTASSIUM CHLORIDE 10 MEQ: 7.46 INJECTION, SOLUTION INTRAVENOUS at 13:52

## 2021-02-13 RX ADMIN — INSULIN HUMAN 2 UNITS: 100 INJECTION, SOLUTION PARENTERAL at 12:19

## 2021-02-13 RX ADMIN — POTASSIUM CHLORIDE 10 MEQ: 7.46 INJECTION, SOLUTION INTRAVENOUS at 10:34

## 2021-02-13 RX ADMIN — DEXTROSE AND SODIUM CHLORIDE 100 ML/HR: 5; 450 INJECTION, SOLUTION INTRAVENOUS at 04:31

## 2021-02-13 RX ADMIN — HYDROMORPHONE HYDROCHLORIDE 0.5 MG: 1 INJECTION, SOLUTION INTRAMUSCULAR; INTRAVENOUS; SUBCUTANEOUS at 20:10

## 2021-02-13 RX ADMIN — ENOXAPARIN SODIUM 40 MG: 40 INJECTION SUBCUTANEOUS at 20:10

## 2021-02-13 RX ADMIN — POLYETHYLENE GLYCOL 3350 17 G: 17 POWDER, FOR SOLUTION ORAL at 10:34

## 2021-02-13 RX ADMIN — DEXTROSE AND SODIUM CHLORIDE 100 ML/HR: 5; 450 INJECTION, SOLUTION INTRAVENOUS at 15:37

## 2021-02-13 RX ADMIN — INSULIN HUMAN 2 UNITS: 100 INJECTION, SOLUTION PARENTERAL at 23:51

## 2021-02-13 RX ADMIN — ONDANSETRON 4 MG: 2 INJECTION INTRAMUSCULAR; INTRAVENOUS at 20:29

## 2021-02-13 RX ADMIN — ONDANSETRON 4 MG: 2 INJECTION INTRAMUSCULAR; INTRAVENOUS at 13:52

## 2021-02-13 RX ADMIN — HYDROMORPHONE HYDROCHLORIDE 0.5 MG: 1 INJECTION, SOLUTION INTRAMUSCULAR; INTRAVENOUS; SUBCUTANEOUS at 03:35

## 2021-02-13 NOTE — PLAN OF CARE
Goal Outcome Evaluation:  Plan of Care Reviewed With: patient  Progress: no change  Outcome Summary: ivf, ngt-tested for ph and draining brown, f/c in place, abd drsg intact with serosanguinous fliud, attempted to walk-was dizzy, npo x ice chips, i/saccuchecks, nicholas x1

## 2021-02-13 NOTE — THERAPY EVALUATION
Patient Name: Jessica Barnes  : 1955    MRN: [id number]                              Today's Date: 2021       Admit Date: 2021    Visit Dx:     ICD-10-CM ICD-9-CM   1. Small bowel obstruction (CMS/HCC)  K56.609 560.9   2. Ventral hernia with bowel obstruction  K43.6 552.20   3. Bowel obstruction (CMS/HCC)  K56.609 560.9     Patient Active Problem List   Diagnosis   • Small bowel obstruction (CMS/HCC)     Past Medical History:   Diagnosis Date   • Cataract    • Diabetes mellitus (CMS/HCC)    • Hyperlipemia    • Hypertension    • Osteoarthritis of knee, unilateral    • PONV (postoperative nausea and vomiting)    • Torn ligament     RIGHT ANKLE     Past Surgical History:   Procedure Laterality Date   •  SECTION     • CHOLECYSTECTOMY     • KNEE ARTHROSCOPY       General Information     Row Name 21 1210          Physical Therapy Time and Intention    Document Type  evaluation;discharge evaluation/summary  -AL     Mode of Treatment  physical therapy  -AL     Row Name 21 1210          General Information    Patient Profile Reviewed  yes  -AL     Prior Level of Function  independent:  -AL     Existing Precautions/Restrictions  no known precautions/restrictions  -AL     Barriers to Rehab  none identified  -AL     Row Name 21 1210          Living Environment    Lives With  spouse  -AL     Row Name 21 1210          Home Main Entrance    Number of Stairs, Main Entrance  three  -AL     Stair Railings, Main Entrance  railings safe and in good condition  -AL       User Key  (r) = Recorded By, (t) = Taken By, (c) = Cosigned By    Initials Name Provider Type    AL Aiyana Renteria, PT Physical Therapist        Mobility     Row Name 21 1210          Bed Mobility    Bed Mobility  supine-sit;sit-supine  -AL     Supine-Sit [place] (Bed Mobility)  modified independence  -AL     Sit-Supine [place] (Bed Mobility)  modified independence  -AL     Row Name 21 1210           Sit-Stand Transfer    Sit-Stand Grandy (Transfers)  modified independence  -AL     Row Name 02/13/21 1210          Gait/Stairs (Locomotion)    Grandy Level (Gait)  modified independence  -AL     Distance in Feet (Gait)  125  -AL     Comment (Gait/Stairs)  held onto IV pole. No LOB.  -AL       User Key  (r) = Recorded By, (t) = Taken By, (c) = Cosigned By    Initials Name Provider Type    Aiyana Reyes, PT Physical Therapist        Obj/Interventions     Row Name 02/13/21 1211          Range of Motion Comprehensive    General Range of Motion  no range of motion deficits identified  -AL     Row Name 02/13/21 1211          Strength Comprehensive (MMT)    Comment, General Manual Muscle Testing (MMT) Assessment  generalized weakness  -AL       User Key  (r) = Recorded By, (t) = Taken By, (c) = Cosigned By    Initials Name Provider Type    Aiyana Reyes, PT Physical Therapist        Goals/Plan    No documentation.       Clinical Impression     Row Name 02/13/21 1211          Pain    Additional Documentation  Pain Scale: Numbers Pre/Post-Treatment (Group)  -AL     Row Name 02/13/21 1211          Pain Scale: Numbers Pre/Post-Treatment    Pretreatment Pain Rating  4/10  -AL     Posttreatment Pain Rating  4/10  -AL     Pain Location - Side  Bilateral  -AL     Pain Location - Orientation  anterior  -AL     Pain Location  abdomen  -AL     Pain Intervention(s)  Ambulation/increased activity;Repositioned  -AL     Row Name 02/13/21 1211          Plan of Care Review    Plan of Care Reviewed With  patient  -AL     Row Name 02/13/21 1211          Therapy Assessment/Plan (PT)    Criteria for Skilled Interventions Met (PT)  no problems identified which require skilled intervention  -AL     Row Name 02/13/21 1211          Positioning and Restraints    Pre-Treatment Position  in bed  -AL     Post Treatment Position  chair  -AL     In Chair  notified nsg;sitting;call light within reach;encouraged to call for assist  -AL        User Key  (r) = Recorded By, (t) = Taken By, (c) = Cosigned By    Initials Name Provider Type    Aiyana Reyes, PT Physical Therapist        Outcome Measures     Row Name 02/13/21 1212          How much help from another person do you currently need...    Turning from your back to your side while in flat bed without using bedrails?  4  -AL     Moving from lying on back to sitting on the side of a flat bed without bedrails?  4  -AL     Moving to and from a bed to a chair (including a wheelchair)?  4  -AL     Standing up from a chair using your arms (e.g., wheelchair, bedside chair)?  4  -AL     Climbing 3-5 steps with a railing?  3  -AL     To walk in hospital room?  3  -AL     AM-PAC 6 Clicks Score (PT)  22  -AL     Row Name 02/13/21 1212          Functional Assessment    Outcome Measure Options  AM-PAC 6 Clicks Basic Mobility (PT)  -AL       User Key  (r) = Recorded By, (t) = Taken By, (c) = Cosigned By    Initials Name Provider Type    Aiyana Reyes, PT Physical Therapist        Physical Therapy Education                 Title: PT OT SLP Therapies (Resolved)     Topic: Physical Therapy (Resolved)     Point: Mobility training (Resolved)     Learner Progress:  Not documented in this visit.          Point: Home exercise program (Resolved)     Learner Progress:  Not documented in this visit.          Point: Body mechanics (Resolved)     Learner Progress:  Not documented in this visit.          Point: Precautions (Resolved)     Learner Progress:  Not documented in this visit.                          PT Recommendation and Plan     Plan of Care Reviewed With: patient  Outcome Summary: Patient is not appropriate for skilled PT secondary to being independent with bed mobility, transfers, and gait. No LOB with gait. Safe to return home from PT standpoint. Able to ambulate halls with nursing.  HHPT may be recommended to help regain strength once home. Will not  patient on PT caseload at this time. PT wore  gloves, mask, and face shield.     Time Calculation:   PT Charges     Row Name 02/13/21 1213             Time Calculation    Start Time  1118  -AL      Stop Time  1130  -AL      Time Calculation (min)  12 min  -AL      PT Received On  02/13/21  -AL        User Key  (r) = Recorded By, (t) = Taken By, (c) = Cosigned By    Initials Name Provider Type    AL Aiyana Renteria, PT Physical Therapist        Therapy Charges for Today     Code Description Service Date Service Provider Modifiers Qty    46971889332 HC PT EVAL LOW COMPLEXITY 2 2/13/2021 Aiyana Renteria, PT GP 1    96600468969 HC PT THER PROC EA 15 MIN 2/13/2021 Aiyana Renteria, PT GP 1          PT G-Codes  Outcome Measure Options: AM-PAC 6 Clicks Basic Mobility (PT)  AM-PAC 6 Clicks Score (PT): 22    Aiyana Renteria, PT  2/13/2021

## 2021-02-13 NOTE — PROGRESS NOTES
"General Surgery Progress Note    Summary:  Mrs. Jessica Barnes is a 65 y.o. year old lady POD2 s/p exploratory laparotomy, lysis of adhesions, primary ventral hernia repair x2 for a closed loop small bowel obstruction in a known ventral hernia.  Continue NG, n.p.o. with ice chips, gum, hard candy; await return of bowel function.  Will remove D Ela Paz today.  Out of bed as tolerated, continue PT.  Continue drain.  Okay to shower.  On Lovenox.    Interval Events: No acute events overnight.  Slept poorly.  Nursing is adjusting her mattress today.  No flatus or bowel movement yet.  NG still bilious.  Did get out of bed and sat in the chair yesterday.    Vitals: /61 (BP Location: Left arm, Patient Position: Lying)   Pulse 97   Temp 98.3 °F (36.8 °C) (Oral)   Resp 16   Ht 167.6 cm (66\")   Wt 102 kg (225 lb)   SpO2 93%   BMI 36.32 kg/m²     Intake & Output:  UOP: 2650 cc/24 hrs  NG with 700 cc    GENERAL: alert, well appearing, and in no distress  HEENT: normocephalic, atraumatic, moist mucous membranes, clear sclerae, NG bilious  CHEST: On 2 L nasal cannula, symmetric air entry  CARDIAC: Regular rhythm, regular rhythm    ABDOMEN: soft, incision clean and dry, some bruising near her umbilicus, HANNAH drain serosanguineous  EXTREMITIES: no cyanosis, clubbing, or edema   SKIN: Warm and moist, no rashes    Labs:  Results from last 7 days   Lab Units 02/13/21  0606 02/12/21  0543 02/11/21 1955   WBC 10*3/mm3 11.40* 16.26* 21.63*   HEMOGLOBIN g/dL 11.4* 11.7* 14.8   HEMATOCRIT % 34.5 34.9 46.1   PLATELETS 10*3/mm3 234 242 376     Results from last 7 days   Lab Units 02/13/21  0606 02/12/21  0543 02/11/21 1955   SODIUM mmol/L 137 134* 137   POTASSIUM mmol/L 3.2* 3.2* 3.4*   CHLORIDE mmol/L 100 93* 89*   CO2 mmol/L 24.6 23.3 26.1   BUN mg/dL 25* 44* 45*   CREATININE mg/dL 0.57 0.75 0.90   CALCIUM mg/dL 9.2 9.3 10.6*   BILIRUBIN mg/dL  --   --  0.4   ALK PHOS U/L  --   --  82   ALT (SGPT) U/L  --   --  5   AST (SGOT) U/L  --  "  --  8   GLUCOSE mg/dL 163* 166* 145*           TRIXIE NICHOLS MD  General and Endoscopic Surgery  [facility]    [address], [address]  [place], [place], [address]  P: [phone]  F: [phone]

## 2021-02-13 NOTE — PLAN OF CARE
Goal Outcome Evaluation:  Plan of Care Reviewed With: patient     Outcome Summary: Patient is not appropriate for skilled PT secondary to being independent with bed mobility, transfers, and gait. No LOB with gait. Safe to return home from PT standpoint. Able to ambulate halls with nursing.  HHPT may be recommended to help regain strength once home. Will not  patient on PT caseload at this time. PT wore gloves, mask, and face shield.

## 2021-02-13 NOTE — PLAN OF CARE
Goal Outcome Evaluation:     Progress: improving  Outcome Summary: VSS. Voiding, ambulated in gordon. Up to chair. NG to lws. NPO x ice chips, water, gum and mints, per Dr. Addison. 4 run potassium, recheck at 18:30.

## 2021-02-14 LAB
ANION GAP SERPL CALCULATED.3IONS-SCNC: 11.5 MMOL/L (ref 5–15)
BASOPHILS # BLD AUTO: 0.05 10*3/MM3 (ref 0–0.2)
BASOPHILS NFR BLD AUTO: 0.4 % (ref 0–1.5)
BUN SERPL-MCNC: 15 MG/DL (ref 8–23)
BUN/CREAT SERPL: 33.3 (ref 7–25)
CALCIUM SPEC-SCNC: 9 MG/DL (ref 8.6–10.5)
CHLORIDE SERPL-SCNC: 99 MMOL/L (ref 98–107)
CO2 SERPL-SCNC: 30.5 MMOL/L (ref 22–29)
CREAT SERPL-MCNC: 0.45 MG/DL (ref 0.57–1)
DEPRECATED RDW RBC AUTO: 41 FL (ref 37–54)
EOSINOPHIL # BLD AUTO: 0.28 10*3/MM3 (ref 0–0.4)
EOSINOPHIL NFR BLD AUTO: 2.3 % (ref 0.3–6.2)
ERYTHROCYTE [DISTWIDTH] IN BLOOD BY AUTOMATED COUNT: 13.4 % (ref 12.3–15.4)
GFR SERPL CREATININE-BSD FRML MDRD: 139 ML/MIN/1.73
GLUCOSE BLDC GLUCOMTR-MCNC: 159 MG/DL (ref 70–130)
GLUCOSE BLDC GLUCOMTR-MCNC: 162 MG/DL (ref 70–130)
GLUCOSE BLDC GLUCOMTR-MCNC: 178 MG/DL (ref 70–130)
GLUCOSE BLDC GLUCOMTR-MCNC: 190 MG/DL (ref 70–130)
GLUCOSE SERPL-MCNC: 177 MG/DL (ref 65–99)
HCT VFR BLD AUTO: 33.8 % (ref 34–46.6)
HGB BLD-MCNC: 11.1 G/DL (ref 12–15.9)
IMM GRANULOCYTES # BLD AUTO: 0.23 10*3/MM3 (ref 0–0.05)
IMM GRANULOCYTES NFR BLD AUTO: 1.9 % (ref 0–0.5)
LYMPHOCYTES # BLD AUTO: 1.75 10*3/MM3 (ref 0.7–3.1)
LYMPHOCYTES NFR BLD AUTO: 14.6 % (ref 19.6–45.3)
MCH RBC QN AUTO: 27.8 PG (ref 26.6–33)
MCHC RBC AUTO-ENTMCNC: 32.8 G/DL (ref 31.5–35.7)
MCV RBC AUTO: 84.5 FL (ref 79–97)
MONOCYTES # BLD AUTO: 0.9 10*3/MM3 (ref 0.1–0.9)
MONOCYTES NFR BLD AUTO: 7.5 % (ref 5–12)
NEUTROPHILS NFR BLD AUTO: 73.3 % (ref 42.7–76)
NEUTROPHILS NFR BLD AUTO: 8.77 10*3/MM3 (ref 1.7–7)
NRBC BLD AUTO-RTO: 0 /100 WBC (ref 0–0.2)
PLATELET # BLD AUTO: 219 10*3/MM3 (ref 140–450)
PMV BLD AUTO: 10 FL (ref 6–12)
POTASSIUM SERPL-SCNC: 3.2 MMOL/L (ref 3.5–5.2)
RBC # BLD AUTO: 4 10*6/MM3 (ref 3.77–5.28)
SODIUM SERPL-SCNC: 141 MMOL/L (ref 136–145)
WBC # BLD AUTO: 11.98 10*3/MM3 (ref 3.4–10.8)

## 2021-02-14 PROCEDURE — 85025 COMPLETE CBC W/AUTO DIFF WBC: CPT | Performed by: STUDENT IN AN ORGANIZED HEALTH CARE EDUCATION/TRAINING PROGRAM

## 2021-02-14 PROCEDURE — 99024 POSTOP FOLLOW-UP VISIT: CPT | Performed by: STUDENT IN AN ORGANIZED HEALTH CARE EDUCATION/TRAINING PROGRAM

## 2021-02-14 PROCEDURE — 25010000002 ONDANSETRON PER 1 MG: Performed by: STUDENT IN AN ORGANIZED HEALTH CARE EDUCATION/TRAINING PROGRAM

## 2021-02-14 PROCEDURE — 63710000001 INSULIN REGULAR HUMAN PER 5 UNITS: Performed by: STUDENT IN AN ORGANIZED HEALTH CARE EDUCATION/TRAINING PROGRAM

## 2021-02-14 PROCEDURE — 80048 BASIC METABOLIC PNL TOTAL CA: CPT | Performed by: STUDENT IN AN ORGANIZED HEALTH CARE EDUCATION/TRAINING PROGRAM

## 2021-02-14 PROCEDURE — 82962 GLUCOSE BLOOD TEST: CPT

## 2021-02-14 PROCEDURE — 25010000002 ENOXAPARIN PER 10 MG: Performed by: STUDENT IN AN ORGANIZED HEALTH CARE EDUCATION/TRAINING PROGRAM

## 2021-02-14 PROCEDURE — 25010000002 HYDROMORPHONE PER 4 MG: Performed by: STUDENT IN AN ORGANIZED HEALTH CARE EDUCATION/TRAINING PROGRAM

## 2021-02-14 RX ORDER — PANTOPRAZOLE SODIUM 40 MG/10ML
40 INJECTION, POWDER, LYOPHILIZED, FOR SOLUTION INTRAVENOUS
Status: DISCONTINUED | OUTPATIENT
Start: 2021-02-14 | End: 2021-02-15

## 2021-02-14 RX ADMIN — ENOXAPARIN SODIUM 40 MG: 40 INJECTION SUBCUTANEOUS at 21:16

## 2021-02-14 RX ADMIN — HYDROMORPHONE HYDROCHLORIDE 0.5 MG: 1 INJECTION, SOLUTION INTRAMUSCULAR; INTRAVENOUS; SUBCUTANEOUS at 06:00

## 2021-02-14 RX ADMIN — ONDANSETRON 4 MG: 2 INJECTION INTRAMUSCULAR; INTRAVENOUS at 06:00

## 2021-02-14 RX ADMIN — PANTOPRAZOLE SODIUM 40 MG: 40 INJECTION, POWDER, FOR SOLUTION INTRAVENOUS at 18:05

## 2021-02-14 RX ADMIN — DEXTROSE AND SODIUM CHLORIDE 100 ML/HR: 5; 450 INJECTION, SOLUTION INTRAVENOUS at 15:00

## 2021-02-14 RX ADMIN — DEXTROSE AND SODIUM CHLORIDE 100 ML/HR: 5; 450 INJECTION, SOLUTION INTRAVENOUS at 02:12

## 2021-02-14 RX ADMIN — HYDROMORPHONE HYDROCHLORIDE 0.5 MG: 1 INJECTION, SOLUTION INTRAMUSCULAR; INTRAVENOUS; SUBCUTANEOUS at 23:32

## 2021-02-14 RX ADMIN — INSULIN HUMAN 2 UNITS: 100 INJECTION, SOLUTION PARENTERAL at 18:05

## 2021-02-14 RX ADMIN — INSULIN HUMAN 2 UNITS: 100 INJECTION, SOLUTION PARENTERAL at 13:49

## 2021-02-14 RX ADMIN — POLYETHYLENE GLYCOL 3350 17 G: 17 POWDER, FOR SOLUTION ORAL at 09:19

## 2021-02-14 RX ADMIN — INSULIN HUMAN 2 UNITS: 100 INJECTION, SOLUTION PARENTERAL at 06:00

## 2021-02-14 NOTE — PROGRESS NOTES
"General Surgery Progress Note    Summary:  Mrs. Jessica Barnes is a 65 y.o. year old lady POD3 s/p exploratory laparotomy, lysis of adhesions, primary ventral hernia repair x2 for a closed loop small bowel obstruction in a known ventral hernia.  NG clamping trial, may remove later today.  On IV fluids.  Added Protonix for bloody NG output.    Interval Events: No acute events overnight.  Slept poorly.  Her throat is sore from the NG tube.  Got up and walked yesterday.    Vitals: /68 (BP Location: Left arm, Patient Position: Lying)   Pulse 95   Temp 99.4 °F (37.4 °C) (Oral)   Resp 18   Ht 167.6 cm (66\")   Wt 102 kg (225 lb)   SpO2 94%   BMI 36.32 kg/m²     Intake & Output:  UOP: 1550 cc/24 hrs  NG with 2650 cc  Drain: 78cc    GENERAL: alert, well appearing, and in no distress  HEENT: normocephalic, atraumatic, moist mucous membranes, clear sclerae, NG thinning with some bloody secretions  CHEST: On 2L nasal cannula, symmetric air entry  CARDIAC: Regular rhythm, regular rhythm    ABDOMEN: soft, incision clean and dry, some bruising near her umbilicus, HANNAH drain serosanguineous  EXTREMITIES: no cyanosis, clubbing, or edema   SKIN: Warm and moist, no rashes    Labs:  Results from last 7 days   Lab Units 02/13/21  0606 02/12/21  0543 02/11/21 1955   WBC 10*3/mm3 11.40* 16.26* 21.63*   HEMOGLOBIN g/dL 11.4* 11.7* 14.8   HEMATOCRIT % 34.5 34.9 46.1   PLATELETS 10*3/mm3 234 242 376     Results from last 7 days   Lab Units 02/13/21  1824 02/13/21  0606 02/12/21  0543 02/11/21 1955   SODIUM mmol/L  --  137 134* 137   POTASSIUM mmol/L 3.7 3.2* 3.2* 3.4*   CHLORIDE mmol/L  --  100 93* 89*   CO2 mmol/L  --  24.6 23.3 26.1   BUN mg/dL  --  25* 44* 45*   CREATININE mg/dL  --  0.57 0.75 0.90   CALCIUM mg/dL  --  9.2 9.3 10.6*   BILIRUBIN mg/dL  --   --   --  0.4   ALK PHOS U/L  --   --   --  82   ALT (SGPT) U/L  --   --   --  5   AST (SGOT) U/L  --   --   --  8   GLUCOSE mg/dL  --  163* 166* 145*           TRIXIE NICHOLS, " MD  General and Endoscopic Surgery  [facility]    [address], [address]  [place], [place], [address]  P: [phone]  F: [phone]

## 2021-02-14 NOTE — PLAN OF CARE
Goal Outcome Evaluation:  Plan of Care Reviewed With: patient  Progress: improving  Outcome Summary: ivf, vdg, abd staple line unremarkable, nicholas x1, walked, med for pain/nausea

## 2021-02-14 NOTE — PLAN OF CARE
Goal Outcome Evaluation:  Temp max 100.7, enc up in chair, ambulation & IS, pt compliant with all, uses IS independently now, passing flatus,  NG to LWS draining dark brown with red tint drainage, red tint could be from red throat spray, NG clamped for 6.5 hrs, belched a couple times, no discomfort, no N/V, voices fear of things that might happen like, what if surgery didn't work, what if she gets sick, what if? Explained with this type of surgery things take a little longer  like advancing diet, removing NG, etc. Explained clamping of NG & to reconnect to suction after 6 hrs, explained she may be able to get NG out, now afraid to have it taken out & need it again, support given, NG reconnected, 350 cc obtained after 2.5 hrs. Pt does not want to have NG d/c'd & does not want to clamp NG again tonight, temp decreased 98.6 no c/o pain all shift pain med offered several times but declined  Plan of Care Reviewed With: patient

## 2021-02-15 LAB
ANION GAP SERPL CALCULATED.3IONS-SCNC: 12.6 MMOL/L (ref 5–15)
BASOPHILS # BLD AUTO: 0.05 10*3/MM3 (ref 0–0.2)
BASOPHILS NFR BLD AUTO: 0.4 % (ref 0–1.5)
BUN SERPL-MCNC: 12 MG/DL (ref 8–23)
BUN/CREAT SERPL: 34.3 (ref 7–25)
CALCIUM SPEC-SCNC: 8.7 MG/DL (ref 8.6–10.5)
CHLORIDE SERPL-SCNC: 98 MMOL/L (ref 98–107)
CO2 SERPL-SCNC: 27.4 MMOL/L (ref 22–29)
CREAT SERPL-MCNC: 0.35 MG/DL (ref 0.57–1)
DEPRECATED RDW RBC AUTO: 45.4 FL (ref 37–54)
EOSINOPHIL # BLD AUTO: 0.34 10*3/MM3 (ref 0–0.4)
EOSINOPHIL NFR BLD AUTO: 3 % (ref 0.3–6.2)
ERYTHROCYTE [DISTWIDTH] IN BLOOD BY AUTOMATED COUNT: 14 % (ref 12.3–15.4)
GFR SERPL CREATININE-BSD FRML MDRD: >150 ML/MIN/1.73
GLUCOSE BLDC GLUCOMTR-MCNC: 151 MG/DL (ref 70–130)
GLUCOSE BLDC GLUCOMTR-MCNC: 153 MG/DL (ref 70–130)
GLUCOSE BLDC GLUCOMTR-MCNC: 168 MG/DL (ref 70–130)
GLUCOSE BLDC GLUCOMTR-MCNC: 186 MG/DL (ref 70–130)
GLUCOSE SERPL-MCNC: 167 MG/DL (ref 65–99)
HCT VFR BLD AUTO: 35.6 % (ref 34–46.6)
HGB BLD-MCNC: 11.1 G/DL (ref 12–15.9)
IMM GRANULOCYTES # BLD AUTO: 0.19 10*3/MM3 (ref 0–0.05)
IMM GRANULOCYTES NFR BLD AUTO: 1.7 % (ref 0–0.5)
LYMPHOCYTES # BLD AUTO: 1.93 10*3/MM3 (ref 0.7–3.1)
LYMPHOCYTES NFR BLD AUTO: 17.2 % (ref 19.6–45.3)
MCH RBC QN AUTO: 27.8 PG (ref 26.6–33)
MCHC RBC AUTO-ENTMCNC: 31.2 G/DL (ref 31.5–35.7)
MCV RBC AUTO: 89 FL (ref 79–97)
MONOCYTES # BLD AUTO: 0.77 10*3/MM3 (ref 0.1–0.9)
MONOCYTES NFR BLD AUTO: 6.9 % (ref 5–12)
NEUTROPHILS NFR BLD AUTO: 7.94 10*3/MM3 (ref 1.7–7)
NEUTROPHILS NFR BLD AUTO: 70.8 % (ref 42.7–76)
NRBC BLD AUTO-RTO: 0 /100 WBC (ref 0–0.2)
PLATELET # BLD AUTO: 211 10*3/MM3 (ref 140–450)
PMV BLD AUTO: 10.1 FL (ref 6–12)
POTASSIUM SERPL-SCNC: 3.5 MMOL/L (ref 3.5–5.2)
RBC # BLD AUTO: 4 10*6/MM3 (ref 3.77–5.28)
SODIUM SERPL-SCNC: 138 MMOL/L (ref 136–145)
WBC # BLD AUTO: 11.22 10*3/MM3 (ref 3.4–10.8)

## 2021-02-15 PROCEDURE — 63710000001 INSULIN REGULAR HUMAN PER 5 UNITS: Performed by: STUDENT IN AN ORGANIZED HEALTH CARE EDUCATION/TRAINING PROGRAM

## 2021-02-15 PROCEDURE — 85025 COMPLETE CBC W/AUTO DIFF WBC: CPT | Performed by: STUDENT IN AN ORGANIZED HEALTH CARE EDUCATION/TRAINING PROGRAM

## 2021-02-15 PROCEDURE — 25010000002 HYDROMORPHONE PER 4 MG: Performed by: STUDENT IN AN ORGANIZED HEALTH CARE EDUCATION/TRAINING PROGRAM

## 2021-02-15 PROCEDURE — 99024 POSTOP FOLLOW-UP VISIT: CPT | Performed by: STUDENT IN AN ORGANIZED HEALTH CARE EDUCATION/TRAINING PROGRAM

## 2021-02-15 PROCEDURE — 80048 BASIC METABOLIC PNL TOTAL CA: CPT | Performed by: STUDENT IN AN ORGANIZED HEALTH CARE EDUCATION/TRAINING PROGRAM

## 2021-02-15 PROCEDURE — 25010000002 ENOXAPARIN PER 10 MG: Performed by: STUDENT IN AN ORGANIZED HEALTH CARE EDUCATION/TRAINING PROGRAM

## 2021-02-15 PROCEDURE — 82962 GLUCOSE BLOOD TEST: CPT

## 2021-02-15 PROCEDURE — 25010000003 POTASSIUM CHLORIDE 10 MEQ/100ML SOLUTION: Performed by: STUDENT IN AN ORGANIZED HEALTH CARE EDUCATION/TRAINING PROGRAM

## 2021-02-15 RX ORDER — ACETAMINOPHEN 500 MG
1000 TABLET ORAL EVERY 8 HOURS
Status: DISCONTINUED | OUTPATIENT
Start: 2021-02-15 | End: 2021-02-17 | Stop reason: HOSPADM

## 2021-02-15 RX ORDER — PANTOPRAZOLE SODIUM 40 MG/1
40 TABLET, DELAYED RELEASE ORAL
Status: DISCONTINUED | OUTPATIENT
Start: 2021-02-15 | End: 2021-02-17 | Stop reason: HOSPADM

## 2021-02-15 RX ADMIN — DEXTROSE AND SODIUM CHLORIDE 100 ML/HR: 5; 450 INJECTION, SOLUTION INTRAVENOUS at 01:26

## 2021-02-15 RX ADMIN — INSULIN HUMAN 2 UNITS: 100 INJECTION, SOLUTION PARENTERAL at 07:13

## 2021-02-15 RX ADMIN — POTASSIUM CHLORIDE 10 MEQ: 7.46 INJECTION, SOLUTION INTRAVENOUS at 05:46

## 2021-02-15 RX ADMIN — ENOXAPARIN SODIUM 40 MG: 40 INJECTION SUBCUTANEOUS at 20:37

## 2021-02-15 RX ADMIN — POTASSIUM CHLORIDE 10 MEQ: 7.46 INJECTION, SOLUTION INTRAVENOUS at 18:05

## 2021-02-15 RX ADMIN — DEXTROSE AND SODIUM CHLORIDE 100 ML/HR: 5; 450 INJECTION, SOLUTION INTRAVENOUS at 23:53

## 2021-02-15 RX ADMIN — POTASSIUM CHLORIDE 10 MEQ: 7.46 INJECTION, SOLUTION INTRAVENOUS at 19:33

## 2021-02-15 RX ADMIN — INSULIN HUMAN 2 UNITS: 100 INJECTION, SOLUTION PARENTERAL at 11:56

## 2021-02-15 RX ADMIN — INSULIN HUMAN 2 UNITS: 100 INJECTION, SOLUTION PARENTERAL at 16:35

## 2021-02-15 RX ADMIN — POTASSIUM CHLORIDE 10 MEQ: 7.46 INJECTION, SOLUTION INTRAVENOUS at 16:36

## 2021-02-15 RX ADMIN — HYDROMORPHONE HYDROCHLORIDE 0.5 MG: 1 INJECTION, SOLUTION INTRAMUSCULAR; INTRAVENOUS; SUBCUTANEOUS at 13:00

## 2021-02-15 RX ADMIN — POTASSIUM CHLORIDE 10 MEQ: 7.46 INJECTION, SOLUTION INTRAVENOUS at 15:18

## 2021-02-15 RX ADMIN — PANTOPRAZOLE SODIUM 40 MG: 40 INJECTION, POWDER, FOR SOLUTION INTRAVENOUS at 09:52

## 2021-02-15 RX ADMIN — POTASSIUM CHLORIDE 10 MEQ: 7.46 INJECTION, SOLUTION INTRAVENOUS at 02:30

## 2021-02-15 RX ADMIN — ACETAMINOPHEN 1000 MG: 500 TABLET, FILM COATED ORAL at 22:39

## 2021-02-15 RX ADMIN — POLYETHYLENE GLYCOL 3350 17 G: 17 POWDER, FOR SOLUTION ORAL at 08:26

## 2021-02-15 RX ADMIN — POTASSIUM CHLORIDE 10 MEQ: 7.46 INJECTION, SOLUTION INTRAVENOUS at 04:49

## 2021-02-15 RX ADMIN — ACETAMINOPHEN 1000 MG: 500 TABLET, FILM COATED ORAL at 15:21

## 2021-02-15 RX ADMIN — PANTOPRAZOLE SODIUM 40 MG: 40 TABLET, DELAYED RELEASE ORAL at 16:36

## 2021-02-15 RX ADMIN — POTASSIUM CHLORIDE 10 MEQ: 7.46 INJECTION, SOLUTION INTRAVENOUS at 03:34

## 2021-02-15 RX ADMIN — DEXTROSE AND SODIUM CHLORIDE 100 ML/HR: 5; 450 INJECTION, SOLUTION INTRAVENOUS at 11:56

## 2021-02-15 NOTE — PROGRESS NOTES
Continued Stay Note  [facility]     Patient Name: Jessica Barnes  MRN: [id number]  Today's Date: 2/15/2021    Admit Date: 2/11/2021    Discharge Plan     Row Name 02/15/21 1409       Plan    Plan  Home no needs    Plan Comments  Met with patient who confirmed DC plan is to return home. Stated spouse will assist as needed and will provide transportation at DC. Denies any needs/equipment. NG D/C'd today.  Patient stated that MD stated poss DC on Thursday. Will continue to follow.        Discharge Codes    No documentation.             Darcie Spencer, RN

## 2021-02-15 NOTE — PROGRESS NOTES
"General Surgery Progress Note    Summary:  Mrs. Jessica Barnes is a 65 y.o. year old lady POD5 s/p exploratory laparotomy, lysis of adhesions, primary ventral hernia repair x2 for a closed loop small bowel obstruction in a known ventral hernia.      Interval Events: No acute events overnight. Passing flatus. NG clamped all day with no symptoms. Was too nervous to remove the NG tube yesterday. Out of bed and ambulating throughout the day.    Vitals: /67 (BP Location: Left arm, Patient Position: Lying)   Pulse 85   Temp 98.5 °F (36.9 °C) (Oral)   Resp 18   Ht 167.6 cm (66\")   Wt 102 kg (225 lb)   SpO2 95%   BMI 36.32 kg/m²     Intake & Output:  UOP: 1100 cc/24 hrs  NG with 800 cc  Drain: 30cc    GENERAL: alert, well appearing, and in no distress  HEENT: normocephalic, atraumatic, moist mucous membranes, clear sclerae, NG thin and gastric   CHEST: On room air, symmetric air entry  CARDIAC: Regular rhythm, regular rhythm    ABDOMEN: soft, incision clean and dry,nondistended, some bruising near her umbilicus, HANNAH drain serosanguineous  EXTREMITIES: no cyanosis, clubbing, or edema   SKIN: Warm and moist, no rashes    Labs:  Results from last 7 days   Lab Units 02/15/21  0641 02/14/21  0757 02/13/21  0606   WBC 10*3/mm3 11.22* 11.98* 11.40*   HEMOGLOBIN g/dL 11.1* 11.1* 11.4*   HEMATOCRIT % 35.6 33.8* 34.5   PLATELETS 10*3/mm3 211 219 234     Results from last 7 days   Lab Units 02/15/21  0641 02/14/21  0757 02/13/21  1824 02/13/21  0606  02/11/21  1955   SODIUM mmol/L 138 141  --  137   < > 137   POTASSIUM mmol/L 3.5 3.2* 3.7 3.2*   < > 3.4*   CHLORIDE mmol/L 98 99  --  100   < > 89*   CO2 mmol/L 27.4 30.5*  --  24.6   < > 26.1   BUN mg/dL 12 15  --  25*   < > 45*   CREATININE mg/dL 0.35* 0.45*  --  0.57   < > 0.90   CALCIUM mg/dL 8.7 9.0  --  9.2   < > 10.6*   BILIRUBIN mg/dL  --   --   --   --   --  0.4   ALK PHOS U/L  --   --   --   --   --  82   ALT (SGPT) U/L  --   --   --   --   --  5   AST (SGOT) U/L  --   " --   --   --   --  8   GLUCOSE mg/dL 167* 177*  --  163*   < > 145*    < > = values in this interval not displayed.           TRIXIE NICHOLS MD  General and Endoscopic Surgery  [facility]    [address] [place] [address], [address]  [place], [place], [address]  P: [phone]  F: [phone]

## 2021-02-15 NOTE — PLAN OF CARE
Goal Outcome Evaluation:  Plan of Care Reviewed With: patient  Progress: improving  Outcome Summary: Passing some flatus --NG is still in and pt is afraid to have it d/c'd. She denies nausea. She did not want to attempt to clamp tube tonight and wants to talk to the surgeon this am before agreeing to have tube d/c'd. She has been afebrile tongiht and VS stable. HANNAH x1 with small amts of serosanginous drainage. Incision line intact with staples

## 2021-02-16 LAB
ANION GAP SERPL CALCULATED.3IONS-SCNC: 8.5 MMOL/L (ref 5–15)
BASOPHILS # BLD AUTO: 0.06 10*3/MM3 (ref 0–0.2)
BASOPHILS NFR BLD AUTO: 0.6 % (ref 0–1.5)
BUN SERPL-MCNC: 9 MG/DL (ref 8–23)
BUN/CREAT SERPL: 23.7 (ref 7–25)
CALCIUM SPEC-SCNC: 8.1 MG/DL (ref 8.6–10.5)
CHLORIDE SERPL-SCNC: 99 MMOL/L (ref 98–107)
CO2 SERPL-SCNC: 28.5 MMOL/L (ref 22–29)
CREAT SERPL-MCNC: 0.38 MG/DL (ref 0.57–1)
DEPRECATED RDW RBC AUTO: 42.7 FL (ref 37–54)
EOSINOPHIL # BLD AUTO: 0.32 10*3/MM3 (ref 0–0.4)
EOSINOPHIL NFR BLD AUTO: 3.1 % (ref 0.3–6.2)
ERYTHROCYTE [DISTWIDTH] IN BLOOD BY AUTOMATED COUNT: 13.5 % (ref 12.3–15.4)
GFR SERPL CREATININE-BSD FRML MDRD: >150 ML/MIN/1.73
GLUCOSE BLDC GLUCOMTR-MCNC: 150 MG/DL (ref 70–130)
GLUCOSE BLDC GLUCOMTR-MCNC: 159 MG/DL (ref 70–130)
GLUCOSE BLDC GLUCOMTR-MCNC: 160 MG/DL (ref 70–130)
GLUCOSE BLDC GLUCOMTR-MCNC: 179 MG/DL (ref 70–130)
GLUCOSE SERPL-MCNC: 172 MG/DL (ref 65–99)
HCT VFR BLD AUTO: 33 % (ref 34–46.6)
HGB BLD-MCNC: 10.6 G/DL (ref 12–15.9)
IMM GRANULOCYTES # BLD AUTO: 0.33 10*3/MM3 (ref 0–0.05)
IMM GRANULOCYTES NFR BLD AUTO: 3.2 % (ref 0–0.5)
LYMPHOCYTES # BLD AUTO: 2.47 10*3/MM3 (ref 0.7–3.1)
LYMPHOCYTES NFR BLD AUTO: 24 % (ref 19.6–45.3)
MCH RBC QN AUTO: 27.9 PG (ref 26.6–33)
MCHC RBC AUTO-ENTMCNC: 32.1 G/DL (ref 31.5–35.7)
MCV RBC AUTO: 86.8 FL (ref 79–97)
MONOCYTES # BLD AUTO: 0.75 10*3/MM3 (ref 0.1–0.9)
MONOCYTES NFR BLD AUTO: 7.3 % (ref 5–12)
NEUTROPHILS NFR BLD AUTO: 6.36 10*3/MM3 (ref 1.7–7)
NEUTROPHILS NFR BLD AUTO: 61.8 % (ref 42.7–76)
NRBC BLD AUTO-RTO: 0 /100 WBC (ref 0–0.2)
PLATELET # BLD AUTO: 226 10*3/MM3 (ref 140–450)
PMV BLD AUTO: 10 FL (ref 6–12)
POTASSIUM SERPL-SCNC: 3.3 MMOL/L (ref 3.5–5.2)
POTASSIUM SERPL-SCNC: 4.4 MMOL/L (ref 3.5–5.2)
RBC # BLD AUTO: 3.8 10*6/MM3 (ref 3.77–5.28)
SODIUM SERPL-SCNC: 136 MMOL/L (ref 136–145)
WBC # BLD AUTO: 10.29 10*3/MM3 (ref 3.4–10.8)

## 2021-02-16 PROCEDURE — 82962 GLUCOSE BLOOD TEST: CPT

## 2021-02-16 PROCEDURE — 80048 BASIC METABOLIC PNL TOTAL CA: CPT | Performed by: STUDENT IN AN ORGANIZED HEALTH CARE EDUCATION/TRAINING PROGRAM

## 2021-02-16 PROCEDURE — 85025 COMPLETE CBC W/AUTO DIFF WBC: CPT | Performed by: STUDENT IN AN ORGANIZED HEALTH CARE EDUCATION/TRAINING PROGRAM

## 2021-02-16 PROCEDURE — 84132 ASSAY OF SERUM POTASSIUM: CPT | Performed by: STUDENT IN AN ORGANIZED HEALTH CARE EDUCATION/TRAINING PROGRAM

## 2021-02-16 PROCEDURE — 25010000002 ONDANSETRON PER 1 MG: Performed by: STUDENT IN AN ORGANIZED HEALTH CARE EDUCATION/TRAINING PROGRAM

## 2021-02-16 PROCEDURE — 63710000001 INSULIN REGULAR HUMAN PER 5 UNITS: Performed by: STUDENT IN AN ORGANIZED HEALTH CARE EDUCATION/TRAINING PROGRAM

## 2021-02-16 PROCEDURE — 25010000002 ENOXAPARIN PER 10 MG: Performed by: STUDENT IN AN ORGANIZED HEALTH CARE EDUCATION/TRAINING PROGRAM

## 2021-02-16 PROCEDURE — 25010000002 HYDROMORPHONE PER 4 MG: Performed by: STUDENT IN AN ORGANIZED HEALTH CARE EDUCATION/TRAINING PROGRAM

## 2021-02-16 RX ORDER — POTASSIUM CHLORIDE 1.5 G/1.77G
40 POWDER, FOR SOLUTION ORAL AS NEEDED
Status: DISCONTINUED | OUTPATIENT
Start: 2021-02-16 | End: 2021-02-17 | Stop reason: HOSPADM

## 2021-02-16 RX ORDER — HYDROCODONE BITARTRATE AND ACETAMINOPHEN 5; 325 MG/1; MG/1
1 TABLET ORAL EVERY 6 HOURS PRN
Status: DISCONTINUED | OUTPATIENT
Start: 2021-02-16 | End: 2021-02-17 | Stop reason: HOSPADM

## 2021-02-16 RX ORDER — POTASSIUM CHLORIDE 750 MG/1
40 TABLET, FILM COATED, EXTENDED RELEASE ORAL AS NEEDED
Status: DISCONTINUED | OUTPATIENT
Start: 2021-02-16 | End: 2021-02-17 | Stop reason: HOSPADM

## 2021-02-16 RX ADMIN — ENOXAPARIN SODIUM 40 MG: 40 INJECTION SUBCUTANEOUS at 20:41

## 2021-02-16 RX ADMIN — POTASSIUM CHLORIDE 40 MEQ: 750 TABLET, EXTENDED RELEASE ORAL at 08:24

## 2021-02-16 RX ADMIN — HYDROMORPHONE HYDROCHLORIDE 0.5 MG: 1 INJECTION, SOLUTION INTRAMUSCULAR; INTRAVENOUS; SUBCUTANEOUS at 00:04

## 2021-02-16 RX ADMIN — ACETAMINOPHEN 500 MG: 500 TABLET, FILM COATED ORAL at 14:36

## 2021-02-16 RX ADMIN — ONDANSETRON 4 MG: 2 INJECTION INTRAMUSCULAR; INTRAVENOUS at 00:05

## 2021-02-16 RX ADMIN — SODIUM CHLORIDE, PRESERVATIVE FREE 10 ML: 5 INJECTION INTRAVENOUS at 08:24

## 2021-02-16 RX ADMIN — INSULIN HUMAN 2 UNITS: 100 INJECTION, SOLUTION PARENTERAL at 16:38

## 2021-02-16 RX ADMIN — HYDROCODONE BITARTRATE AND ACETAMINOPHEN 1 TABLET: 5; 325 TABLET ORAL at 23:55

## 2021-02-16 RX ADMIN — INSULIN HUMAN 2 UNITS: 100 INJECTION, SOLUTION PARENTERAL at 22:15

## 2021-02-16 RX ADMIN — ACETAMINOPHEN 500 MG: 500 TABLET, FILM COATED ORAL at 23:56

## 2021-02-16 RX ADMIN — ACETAMINOPHEN 1000 MG: 500 TABLET, FILM COATED ORAL at 07:04

## 2021-02-16 RX ADMIN — PANTOPRAZOLE SODIUM 40 MG: 40 TABLET, DELAYED RELEASE ORAL at 16:38

## 2021-02-16 RX ADMIN — POTASSIUM CHLORIDE 40 MEQ: 750 TABLET, EXTENDED RELEASE ORAL at 12:28

## 2021-02-16 RX ADMIN — PANTOPRAZOLE SODIUM 40 MG: 40 TABLET, DELAYED RELEASE ORAL at 07:04

## 2021-02-16 RX ADMIN — HYDROCODONE BITARTRATE AND ACETAMINOPHEN 1 TABLET: 5; 325 TABLET ORAL at 14:36

## 2021-02-16 RX ADMIN — SODIUM CHLORIDE, PRESERVATIVE FREE 10 ML: 5 INJECTION INTRAVENOUS at 20:41

## 2021-02-16 RX ADMIN — POLYETHYLENE GLYCOL 3350 17 G: 17 POWDER, FOR SOLUTION ORAL at 08:24

## 2021-02-16 NOTE — PLAN OF CARE
"Goal Outcome Evaluation:  Plan of Care Reviewed With: patient  Progress: improving  Outcome Summary: NG removed yesterday. Pt denies nausea but stated that she was having a little\"gastric reflux/burning sensation\" x1--medicated with zofran. Medicate with dilaudid for pain. Incision intact with staples and HANNAH x1 with small amts of sersanginouos drainge. Had bm ysterday.  "

## 2021-02-16 NOTE — PLAN OF CARE
Goal Outcome Evaluation:\  VSS, no temp, abd insicion dry & intact with staples, HANNAH x 1 draining serosangenous drainage, medicated for pain x 1 with good relief, anxious at times, does not want NG removed until talking with MD, NG clamped for 6 hrs, small amount of belching, no N/V, NG removed per Dr Addison, pt states felt much better after NG removed, diet advanced to clear liquids, tolerating well,  ambulated in gordon x 2, up in chair x 2, uses IS independently, K+ 3.5, 4 IV runs given to supplement, up- with assist of 1, slight BM  Plan of Care Reviewed With: patient

## 2021-02-16 NOTE — PLAN OF CARE
Goal Outcome Evaluation:  Plan of Care Reviewed With: patient  Progress: improving  Outcome Summary: abdomen soft, hypoactive bowel sounds, no n/v, diet advanced to regular today, encouraged ambulation and pt sat in chair today, iv saline locked, Norco given for pain along with Tylenol - pt with relief, HANNAH x1 with small amt of serosanguinous drainage noted, monitoring blood sugars, potassium supplement given per protocol

## 2021-02-16 NOTE — PROGRESS NOTES
Continued Stay Note  [facility]     Patient Name: Jessica Barnes  MRN: [id number]  Today's Date: 2/16/2021    Admit Date: 2/11/2021    Discharge Plan     Row Name 02/16/21 1415       Plan    Plan  Home no needs    Plan Comments  Met with patient who confirmed DC plan is home. Denies any needs/equipment. Poss DC soon        Discharge Codes    No documentation.             Darcie Spencer RN

## 2021-02-16 NOTE — PROGRESS NOTES
"General Surgery Progress Note    Summary:  Mrs. Jessica Barnes is a 65 y.o. year old lady POD6 s/p exploratory laparotomy, lysis of adhesions, primary ventral hernia repair x2 for a closed loop small bowel obstruction in a known ventral hernia. Tolerating CLD, had a BM yesterday. Advance diet. OOB and ambulate as tolerated, on lovenox. Tentative plan to remove the drain and home tomorrow.    Interval Events: No acute events overnight. Had a BM yesterday. NG removed and started on CLD yesterday. Tolerated well. Has been up and ambulating; a little shaky but otherwise ok.     Vitals: /67 (BP Location: Left arm)   Pulse 81   Temp 98.4 °F (36.9 °C)   Resp 18   Ht 167.6 cm (66\")   Wt 102 kg (225 lb)   SpO2 93%   BMI 36.32 kg/m²     Drain: 20cc     GENERAL: alert, well appearing, and in no distress  HEENT: normocephalic, atraumatic, moist mucous membranes, clear sclerae, NG thin and gastric   CHEST: On room air, symmetric air entry  CARDIAC: Regular rhythm, regular rhythm    ABDOMEN: soft, incision clean and dry, nondistended, mild bruising around her umbilicus, HANNAH drain serosanguineous  EXTREMITIES: no cyanosis, clubbing, or edema   SKIN: Warm and moist, no rashes    Labs:  Results from last 7 days   Lab Units 02/16/21  0550 02/15/21  0641 02/14/21  0757   WBC 10*3/mm3 10.29 11.22* 11.98*   HEMOGLOBIN g/dL 10.6* 11.1* 11.1*   HEMATOCRIT % 33.0* 35.6 33.8*   PLATELETS 10*3/mm3 226 211 219     Results from last 7 days   Lab Units 02/16/21  0550 02/15/21  0641 02/14/21  0757  02/11/21  1955   SODIUM mmol/L 136 138 141   < > 137   POTASSIUM mmol/L 3.3* 3.5 3.2*   < > 3.4*   CHLORIDE mmol/L 99 98 99   < > 89*   CO2 mmol/L 28.5 27.4 30.5*   < > 26.1   BUN mg/dL 9 12 15   < > 45*   CREATININE mg/dL 0.38* 0.35* 0.45*   < > 0.90   CALCIUM mg/dL 8.1* 8.7 9.0   < > 10.6*   BILIRUBIN mg/dL  --   --   --   --  0.4   ALK PHOS U/L  --   --   --   --  82   ALT (SGPT) U/L  --   --   --   --  5   AST (SGOT) U/L  --   --   --   " --  8   GLUCOSE mg/dL 172* 167* 177*   < > 145*    < > = values in this interval not displayed.           TRIXIE NICHOLS MD  General and Endoscopic Surgery  [facility]    [address], [address]  [place], [place], [address]  P: [phone]  F: [phone]

## 2021-02-17 VITALS
SYSTOLIC BLOOD PRESSURE: 130 MMHG | BODY MASS INDEX: 36.16 KG/M2 | HEART RATE: 78 BPM | OXYGEN SATURATION: 94 % | HEIGHT: 66 IN | DIASTOLIC BLOOD PRESSURE: 58 MMHG | TEMPERATURE: 98.3 F | WEIGHT: 225 LBS | RESPIRATION RATE: 18 BRPM

## 2021-02-17 LAB
ANION GAP SERPL CALCULATED.3IONS-SCNC: 7 MMOL/L (ref 5–15)
BASOPHILS # BLD AUTO: 0.05 10*3/MM3 (ref 0–0.2)
BASOPHILS NFR BLD AUTO: 0.5 % (ref 0–1.5)
BUN SERPL-MCNC: 8 MG/DL (ref 8–23)
BUN/CREAT SERPL: 18.6 (ref 7–25)
CALCIUM SPEC-SCNC: 7.7 MG/DL (ref 8.6–10.5)
CHLORIDE SERPL-SCNC: 101 MMOL/L (ref 98–107)
CO2 SERPL-SCNC: 30 MMOL/L (ref 22–29)
CREAT SERPL-MCNC: 0.43 MG/DL (ref 0.57–1)
DEPRECATED RDW RBC AUTO: 41.2 FL (ref 37–54)
EOSINOPHIL # BLD AUTO: 0.26 10*3/MM3 (ref 0–0.4)
EOSINOPHIL NFR BLD AUTO: 2.8 % (ref 0.3–6.2)
ERYTHROCYTE [DISTWIDTH] IN BLOOD BY AUTOMATED COUNT: 13.3 % (ref 12.3–15.4)
GFR SERPL CREATININE-BSD FRML MDRD: 147 ML/MIN/1.73
GLUCOSE BLDC GLUCOMTR-MCNC: 147 MG/DL (ref 70–130)
GLUCOSE BLDC GLUCOMTR-MCNC: 191 MG/DL (ref 70–130)
GLUCOSE SERPL-MCNC: 150 MG/DL (ref 65–99)
HCT VFR BLD AUTO: 32.7 % (ref 34–46.6)
HGB BLD-MCNC: 10.6 G/DL (ref 12–15.9)
IMM GRANULOCYTES # BLD AUTO: 0.41 10*3/MM3 (ref 0–0.05)
IMM GRANULOCYTES NFR BLD AUTO: 4.4 % (ref 0–0.5)
LYMPHOCYTES # BLD AUTO: 2.03 10*3/MM3 (ref 0.7–3.1)
LYMPHOCYTES NFR BLD AUTO: 21.7 % (ref 19.6–45.3)
MCH RBC QN AUTO: 27.7 PG (ref 26.6–33)
MCHC RBC AUTO-ENTMCNC: 32.4 G/DL (ref 31.5–35.7)
MCV RBC AUTO: 85.4 FL (ref 79–97)
MONOCYTES # BLD AUTO: 0.67 10*3/MM3 (ref 0.1–0.9)
MONOCYTES NFR BLD AUTO: 7.2 % (ref 5–12)
NEUTROPHILS NFR BLD AUTO: 5.94 10*3/MM3 (ref 1.7–7)
NEUTROPHILS NFR BLD AUTO: 63.4 % (ref 42.7–76)
NRBC BLD AUTO-RTO: 0 /100 WBC (ref 0–0.2)
PLATELET # BLD AUTO: 226 10*3/MM3 (ref 140–450)
PMV BLD AUTO: 10 FL (ref 6–12)
POTASSIUM SERPL-SCNC: 3.9 MMOL/L (ref 3.5–5.2)
RBC # BLD AUTO: 3.83 10*6/MM3 (ref 3.77–5.28)
SODIUM SERPL-SCNC: 138 MMOL/L (ref 136–145)
WBC # BLD AUTO: 9.36 10*3/MM3 (ref 3.4–10.8)

## 2021-02-17 PROCEDURE — 63710000001 INSULIN REGULAR HUMAN PER 5 UNITS: Performed by: STUDENT IN AN ORGANIZED HEALTH CARE EDUCATION/TRAINING PROGRAM

## 2021-02-17 PROCEDURE — 82962 GLUCOSE BLOOD TEST: CPT

## 2021-02-17 PROCEDURE — 85025 COMPLETE CBC W/AUTO DIFF WBC: CPT | Performed by: STUDENT IN AN ORGANIZED HEALTH CARE EDUCATION/TRAINING PROGRAM

## 2021-02-17 PROCEDURE — 80048 BASIC METABOLIC PNL TOTAL CA: CPT | Performed by: STUDENT IN AN ORGANIZED HEALTH CARE EDUCATION/TRAINING PROGRAM

## 2021-02-17 RX ORDER — HYDROCODONE BITARTRATE AND ACETAMINOPHEN 5; 325 MG/1; MG/1
1 TABLET ORAL EVERY 6 HOURS PRN
Qty: 15 TABLET | Refills: 0 | Status: SHIPPED | OUTPATIENT
Start: 2021-02-17 | End: 2021-02-23

## 2021-02-17 RX ADMIN — INSULIN HUMAN 2 UNITS: 100 INJECTION, SOLUTION PARENTERAL at 11:43

## 2021-02-17 RX ADMIN — ACETAMINOPHEN 1000 MG: 500 TABLET, FILM COATED ORAL at 06:05

## 2021-02-17 RX ADMIN — SODIUM CHLORIDE, PRESERVATIVE FREE 10 ML: 5 INJECTION INTRAVENOUS at 08:10

## 2021-02-17 RX ADMIN — HYDROCODONE BITARTRATE AND ACETAMINOPHEN 1 TABLET: 5; 325 TABLET ORAL at 12:55

## 2021-02-17 RX ADMIN — PANTOPRAZOLE SODIUM 40 MG: 40 TABLET, DELAYED RELEASE ORAL at 06:40

## 2021-02-17 NOTE — PROGRESS NOTES
Continued Stay Note  [facility]     Patient Name: Jessica Barnes  MRN: [id number]  Today's Date: 2/17/2021    Admit Date: 2/11/2021    Discharge Plan     Row Name 02/17/21 1232       Plan    Plan  Home no needs    Plan Comments  Discharged order noted. Met with patient who confirmed DC plan is home. Stated spouse will assist as needed and will provide transportation at DC. Denies any needs/equipment.        Discharge Codes    No documentation.       Expected Discharge Date and Time     Expected Discharge Date Expected Discharge Time    Feb 17, 2021             Darcie Spencer RN

## 2021-02-17 NOTE — PLAN OF CARE
Goal Outcome Evaluation:  Plan of Care Reviewed With: patient  Progress: improving  Outcome Summary: anxious but wants to go home. MD removed HANNAH, gauze and tegaderm dsg cdi. midline inc with staples. loose BM concerns pt but MD resassured. up ad chapis. enc amb and IS use. pain well controlled with Norco and apap.

## 2021-02-17 NOTE — PLAN OF CARE
Problem: Adult Inpatient Plan of Care  Goal: Plan of Care Review  Outcome: Ongoing, Progressing  Flowsheets (Taken 2/17/2021 7968)  Progress: improving  Plan of Care Reviewed With: patient  Outcome Summary: anxious about going home, reassured, doing well, adequate pain control on Norco and Tylenol, abdomen soft, tender, incision CDI open to air, very small HANNAH output, audible bowel sounds, no stool during the night, tolerated Regular diet, possible home in am   Goal Outcome Evaluation:  Plan of Care Reviewed With: patient  Progress: improving  Outcome Summary: anxious about going home, reassured, doing well, adequate pain control on Norco and Tylenol, abdomen soft, tender, incision CDI open to air, very small HANNAH output, audible bowel sounds, no stool during the night, tolerated Regular diet, possible home in am

## 2021-02-17 NOTE — DISCHARGE SUMMARY
DATE OF ADMISSION:  2/12/2021  DATE OF DISCHARGE:  2/17/2021    ATTENDING:        Alie Addison M.D.       GENERAL SURGERY    CONSULTS:    None    PRINCIPAL DIAGNOSIS:     Incarcerated ventral hernia     DISCHARGE DIAGNOSES:     Incarcerated ventral hernia    HOSPITAL PROCEDURES:     2/12/2021 exploratory laparotomy, lysis of adhesions, primary ventral hernia repair    HOSPITAL  COURSE:   Patient was admitted and went to the OR on the night of admission for an incarcerated ventral hernia causing a bowel obstruction. She had an uneventful post-operative course. Her diet was slowly advanced as GI function returned. On the day of d/c, she was tolerating a regular diet, her pain was controlled with PO pain medication, and she was having regular bowel function. Her drain was removed on the day of d/c as the output was low and serosanguinous.     ACTIVITY:  Okay to shower.  Ambulate and climb stairs as tolerated.  No lifting over 10 lbs for 2 weeks.  Okay to drive if not taking pain medications.    DIET:  Regular    FOLLOW UP:  With Dr Addison in 2 weeks. Instructed to call [phone] for an appointment.

## 2021-02-17 NOTE — PROGRESS NOTES
Case Management Discharge Note      Final Note: Discharged home. Darcie Spencer RN    Provided Post Acute Provider List?: N/A  N/A Provider List Comment: Denies needs. Plan is home    Transportation Services  Private: Car    Final Discharge Disposition Code: 01 - home or self-care

## 2021-02-25 ENCOUNTER — TELEPHONE (OUTPATIENT)
Dept: SURGERY | Facility: CLINIC | Age: 66
End: 2021-02-25

## 2021-02-25 NOTE — TELEPHONE ENCOUNTER
Patient called the office today with concern that the naval portion of her incision may be infected s/p exploratory laparotomy, lysis of adhesions, primary ventral hernia repair x2 10/27/54 by Dr. Addison.   Reports the incision has foul odor with a clear yellow drainage. No fever or heat to touch. Below is a picture the patient forwarded to the office e-mail. Advised that it looks like some erythema and necrotic tissue, as well as irritation from the staples.   Patient asked that I still run this by you to ensure she does not require abx.

## 2021-02-25 NOTE — TELEPHONE ENCOUNTER
Called patient and informed her of Dr. Addison's response. Patient verbalized understanding. She will call with any worsening symptoms/signs of infection, otherwise we will see her Monday.

## 2021-03-01 ENCOUNTER — OFFICE VISIT (OUTPATIENT)
Dept: SURGERY | Facility: CLINIC | Age: 66
End: 2021-03-01

## 2021-03-01 VITALS — WEIGHT: 225 LBS | HEIGHT: 66 IN | BODY MASS INDEX: 36.16 KG/M2

## 2021-03-01 DIAGNOSIS — K43.6 INCARCERATED VENTRAL HERNIA: Primary | ICD-10-CM

## 2021-03-01 PROCEDURE — 99024 POSTOP FOLLOW-UP VISIT: CPT | Performed by: STUDENT IN AN ORGANIZED HEALTH CARE EDUCATION/TRAINING PROGRAM

## 2021-03-01 RX ORDER — ONDANSETRON 4 MG/1
4 TABLET, ORALLY DISINTEGRATING ORAL EVERY 8 HOURS PRN
COMMUNITY
Start: 2021-02-17

## 2021-03-01 RX ORDER — CLINDAMYCIN HYDROCHLORIDE 150 MG/1
150 CAPSULE ORAL 4 TIMES DAILY
Qty: 20 CAPSULE | Refills: 0 | Status: SHIPPED | OUTPATIENT
Start: 2021-03-01 | End: 2021-03-06

## 2021-03-09 NOTE — PROGRESS NOTES
General Surgery Post-Operative Follow Up Note     Summary:    Mrs. Jessica Barnes is a 65 y.o. year old lady here for post-operative follow up from exploratory laparotomy, lysis of adhesions, open ventral hernia repair. Doing well.  Wound is healing well.  Small amount of fibrinous tissue debrided at the bedside today.  Follow-up as needed.    History of Present Illness:    Mrs. Jessica Barnes is a 65 y.o. year old lady here for post-operative follow up.  She is doing well.  She is tolerating a regular diet and having normal bowel function.  Her pain is resolved.  Her wound is healing well.  He completed her course of antibiotics.    Procedure:    • 2/12/2021 exploratory laparotomy, lysis of adhesions, primary ventral hernia repair x2    Pathology:    Final Diagnosis   1. Hernia Sac:   A. Benign fibrovascular tissue and adipose consistent with hernia sac.     Physical Exam:   • Constitutional: Well-developed well-nourished, no acute distress  • Eyes: Conjunctiva normal, sclera nonicteric  • ENMT: Hearing grossly normal, oral mucosa moist  • Respiratory: No increased work of breathing, normal inspiratory effort  • Cardiovascular: Regular rate, no peripheral edema, no jugular venous distention  • Gastrointestinal: Soft, non-tender, incision clean, no further induration or erythema, small amount of fibrinous exudate around the umbilicus, wound overall looks much better  • Skin:  Warm, dry, no rash on visualized skin surfaces  • Musculoskeletal: Symmetric strength, normal gait  • Psychiatric: Alert and oriented ×3, normal affect       Alie Addison M.D.  General and Endoscopic Surgery  [facility]    [address] [place] [address], [address]  [place], [place], [facility]  P: [phone]  F: [phone]

## 2021-03-15 ENCOUNTER — OFFICE VISIT (OUTPATIENT)
Dept: SURGERY | Facility: CLINIC | Age: 66
End: 2021-03-15

## 2021-03-15 VITALS — BODY MASS INDEX: 36.16 KG/M2 | WEIGHT: 225 LBS | HEIGHT: 66 IN

## 2021-03-15 DIAGNOSIS — K43.6 INCARCERATED VENTRAL HERNIA: Primary | ICD-10-CM

## 2021-03-15 PROCEDURE — 99024 POSTOP FOLLOW-UP VISIT: CPT | Performed by: STUDENT IN AN ORGANIZED HEALTH CARE EDUCATION/TRAINING PROGRAM

## 2021-03-19 ENCOUNTER — BULK ORDERING (OUTPATIENT)
Dept: CASE MANAGEMENT | Facility: OTHER | Age: 66
End: 2021-03-19

## 2021-03-19 DIAGNOSIS — Z23 IMMUNIZATION DUE: ICD-10-CM

## 2021-10-06 ENCOUNTER — APPOINTMENT (OUTPATIENT)
Dept: GENERAL RADIOLOGY | Facility: HOSPITAL | Age: 66
End: 2021-10-06

## 2021-10-06 ENCOUNTER — HOSPITAL ENCOUNTER (INPATIENT)
Facility: HOSPITAL | Age: 66
LOS: 9 days | Discharge: NURSING, REHAB OR LONG-TERM CARE | End: 2021-10-17
Attending: EMERGENCY MEDICINE | Admitting: INTERNAL MEDICINE

## 2021-10-06 DIAGNOSIS — S42.295A OTHER CLOSED NONDISPLACED FRACTURE OF PROXIMAL END OF LEFT HUMERUS, INITIAL ENCOUNTER: Primary | ICD-10-CM

## 2021-10-06 DIAGNOSIS — S42.202A CLOSED FRACTURE OF PROXIMAL END OF LEFT HUMERUS, UNSPECIFIED FRACTURE MORPHOLOGY, INITIAL ENCOUNTER: ICD-10-CM

## 2021-10-06 DIAGNOSIS — S82.832A OTHER CLOSED FRACTURE OF DISTAL END OF LEFT FIBULA, INITIAL ENCOUNTER: ICD-10-CM

## 2021-10-06 LAB
ALBUMIN SERPL-MCNC: 4.2 G/DL (ref 3.5–5.2)
ALBUMIN/GLOB SERPL: 1.6 G/DL
ALP SERPL-CCNC: 99 U/L (ref 39–117)
ALT SERPL W P-5'-P-CCNC: 20 U/L (ref 1–33)
ANION GAP SERPL CALCULATED.3IONS-SCNC: 12.3 MMOL/L (ref 5–15)
AST SERPL-CCNC: 54 U/L (ref 1–32)
BASOPHILS # BLD AUTO: 0.04 10*3/MM3 (ref 0–0.2)
BASOPHILS NFR BLD AUTO: 0.2 % (ref 0–1.5)
BILIRUB SERPL-MCNC: 0.3 MG/DL (ref 0–1.2)
BUN SERPL-MCNC: 22 MG/DL (ref 8–23)
BUN/CREAT SERPL: 34.9 (ref 7–25)
CALCIUM SPEC-SCNC: 9.8 MG/DL (ref 8.6–10.5)
CHLORIDE SERPL-SCNC: 100 MMOL/L (ref 98–107)
CO2 SERPL-SCNC: 26.7 MMOL/L (ref 22–29)
CREAT SERPL-MCNC: 0.63 MG/DL (ref 0.57–1)
DEPRECATED RDW RBC AUTO: 43.6 FL (ref 37–54)
EOSINOPHIL # BLD AUTO: 0.03 10*3/MM3 (ref 0–0.4)
EOSINOPHIL NFR BLD AUTO: 0.2 % (ref 0.3–6.2)
ERYTHROCYTE [DISTWIDTH] IN BLOOD BY AUTOMATED COUNT: 14 % (ref 12.3–15.4)
GFR SERPL CREATININE-BSD FRML MDRD: 95 ML/MIN/1.73
GLOBULIN UR ELPH-MCNC: 2.6 GM/DL
GLUCOSE BLDC GLUCOMTR-MCNC: 101 MG/DL (ref 70–130)
GLUCOSE BLDC GLUCOMTR-MCNC: 108 MG/DL (ref 70–130)
GLUCOSE SERPL-MCNC: 101 MG/DL (ref 65–99)
HCT VFR BLD AUTO: 42.5 % (ref 34–46.6)
HGB BLD-MCNC: 13.8 G/DL (ref 12–15.9)
IMM GRANULOCYTES # BLD AUTO: 0.14 10*3/MM3 (ref 0–0.05)
IMM GRANULOCYTES NFR BLD AUTO: 0.8 % (ref 0–0.5)
LYMPHOCYTES # BLD AUTO: 1.27 10*3/MM3 (ref 0.7–3.1)
LYMPHOCYTES NFR BLD AUTO: 7.4 % (ref 19.6–45.3)
MCH RBC QN AUTO: 27.7 PG (ref 26.6–33)
MCHC RBC AUTO-ENTMCNC: 32.5 G/DL (ref 31.5–35.7)
MCV RBC AUTO: 85.3 FL (ref 79–97)
MONOCYTES # BLD AUTO: 0.71 10*3/MM3 (ref 0.1–0.9)
MONOCYTES NFR BLD AUTO: 4.1 % (ref 5–12)
NEUTROPHILS NFR BLD AUTO: 14.95 10*3/MM3 (ref 1.7–7)
NEUTROPHILS NFR BLD AUTO: 87.3 % (ref 42.7–76)
NRBC BLD AUTO-RTO: 0 /100 WBC (ref 0–0.2)
PLATELET # BLD AUTO: 210 10*3/MM3 (ref 140–450)
PMV BLD AUTO: 10.3 FL (ref 6–12)
POTASSIUM SERPL-SCNC: 3.6 MMOL/L (ref 3.5–5.2)
PROT SERPL-MCNC: 6.8 G/DL (ref 6–8.5)
RBC # BLD AUTO: 4.98 10*6/MM3 (ref 3.77–5.28)
SARS-COV-2 RNA PNL SPEC NAA+PROBE: NOT DETECTED
SODIUM SERPL-SCNC: 139 MMOL/L (ref 136–145)
WBC # BLD AUTO: 17.14 10*3/MM3 (ref 3.4–10.8)

## 2021-10-06 PROCEDURE — 80053 COMPREHEN METABOLIC PANEL: CPT | Performed by: NURSE PRACTITIONER

## 2021-10-06 PROCEDURE — 82962 GLUCOSE BLOOD TEST: CPT

## 2021-10-06 PROCEDURE — 87635 SARS-COV-2 COVID-19 AMP PRB: CPT | Performed by: NURSE PRACTITIONER

## 2021-10-06 PROCEDURE — 25010000002 HYDROMORPHONE PER 4 MG: Performed by: NURSE PRACTITIONER

## 2021-10-06 PROCEDURE — 73030 X-RAY EXAM OF SHOULDER: CPT

## 2021-10-06 PROCEDURE — 73590 X-RAY EXAM OF LOWER LEG: CPT

## 2021-10-06 PROCEDURE — G0378 HOSPITAL OBSERVATION PER HR: HCPCS

## 2021-10-06 PROCEDURE — 25010000002 HYDROMORPHONE PER 4 MG: Performed by: INTERNAL MEDICINE

## 2021-10-06 PROCEDURE — 25010000002 ONDANSETRON PER 1 MG: Performed by: NURSE PRACTITIONER

## 2021-10-06 PROCEDURE — 85025 COMPLETE CBC W/AUTO DIFF WBC: CPT | Performed by: NURSE PRACTITIONER

## 2021-10-06 PROCEDURE — 99284 EMERGENCY DEPT VISIT MOD MDM: CPT

## 2021-10-06 PROCEDURE — 73610 X-RAY EXAM OF ANKLE: CPT

## 2021-10-06 RX ORDER — INSULIN LISPRO 100 [IU]/ML
0-9 INJECTION, SOLUTION INTRAVENOUS; SUBCUTANEOUS
Status: DISCONTINUED | OUTPATIENT
Start: 2021-10-06 | End: 2021-10-17 | Stop reason: HOSPADM

## 2021-10-06 RX ORDER — ACETAMINOPHEN 325 MG/1
650 TABLET ORAL EVERY 4 HOURS PRN
Status: DISCONTINUED | OUTPATIENT
Start: 2021-10-06 | End: 2021-10-17 | Stop reason: HOSPADM

## 2021-10-06 RX ORDER — ONDANSETRON 2 MG/ML
4 INJECTION INTRAMUSCULAR; INTRAVENOUS ONCE
Status: COMPLETED | OUTPATIENT
Start: 2021-10-06 | End: 2021-10-06

## 2021-10-06 RX ORDER — HYDROCHLOROTHIAZIDE 25 MG/1
50 TABLET ORAL DAILY
Status: DISCONTINUED | OUTPATIENT
Start: 2021-10-07 | End: 2021-10-09

## 2021-10-06 RX ORDER — HYDROMORPHONE HYDROCHLORIDE 1 MG/ML
0.5 INJECTION, SOLUTION INTRAMUSCULAR; INTRAVENOUS; SUBCUTANEOUS EVERY 4 HOURS PRN
Status: DISCONTINUED | OUTPATIENT
Start: 2021-10-06 | End: 2021-10-13

## 2021-10-06 RX ORDER — AMLODIPINE BESYLATE 10 MG/1
10 TABLET ORAL
Status: DISCONTINUED | OUTPATIENT
Start: 2021-10-07 | End: 2021-10-17 | Stop reason: HOSPADM

## 2021-10-06 RX ORDER — HYDROMORPHONE HYDROCHLORIDE 1 MG/ML
0.5 INJECTION, SOLUTION INTRAMUSCULAR; INTRAVENOUS; SUBCUTANEOUS ONCE
Status: COMPLETED | OUTPATIENT
Start: 2021-10-06 | End: 2021-10-06

## 2021-10-06 RX ORDER — ACETAMINOPHEN 160 MG/5ML
650 SOLUTION ORAL EVERY 4 HOURS PRN
Status: DISCONTINUED | OUTPATIENT
Start: 2021-10-06 | End: 2021-10-17 | Stop reason: HOSPADM

## 2021-10-06 RX ORDER — DEXTROSE MONOHYDRATE 25 G/50ML
25 INJECTION, SOLUTION INTRAVENOUS
Status: DISCONTINUED | OUTPATIENT
Start: 2021-10-06 | End: 2021-10-17 | Stop reason: HOSPADM

## 2021-10-06 RX ORDER — ACETAMINOPHEN 650 MG/1
650 SUPPOSITORY RECTAL EVERY 4 HOURS PRN
Status: DISCONTINUED | OUTPATIENT
Start: 2021-10-06 | End: 2021-10-17 | Stop reason: HOSPADM

## 2021-10-06 RX ORDER — ATORVASTATIN CALCIUM 20 MG/1
10 TABLET, FILM COATED ORAL DAILY
Status: DISCONTINUED | OUTPATIENT
Start: 2021-10-07 | End: 2021-10-17 | Stop reason: HOSPADM

## 2021-10-06 RX ORDER — ONDANSETRON 2 MG/ML
4 INJECTION INTRAMUSCULAR; INTRAVENOUS EVERY 6 HOURS PRN
Status: DISCONTINUED | OUTPATIENT
Start: 2021-10-06 | End: 2021-10-17 | Stop reason: HOSPADM

## 2021-10-06 RX ORDER — INSULIN LISPRO 100 [IU]/ML
15 INJECTION, SOLUTION INTRAVENOUS; SUBCUTANEOUS 2 TIMES DAILY
Status: DISCONTINUED | OUTPATIENT
Start: 2021-10-06 | End: 2021-10-11

## 2021-10-06 RX ORDER — MELATONIN
1000 DAILY
Status: DISCONTINUED | OUTPATIENT
Start: 2021-10-07 | End: 2021-10-17 | Stop reason: HOSPADM

## 2021-10-06 RX ORDER — NICOTINE POLACRILEX 4 MG
15 LOZENGE BUCCAL
Status: DISCONTINUED | OUTPATIENT
Start: 2021-10-06 | End: 2021-10-17 | Stop reason: HOSPADM

## 2021-10-06 RX ORDER — HYDROCODONE BITARTRATE AND ACETAMINOPHEN 7.5; 325 MG/1; MG/1
1 TABLET ORAL EVERY 6 HOURS PRN
Status: DISCONTINUED | OUTPATIENT
Start: 2021-10-06 | End: 2021-10-13

## 2021-10-06 RX ORDER — INSULIN GLARGINE 100 [IU]/ML
100 INJECTION, SOLUTION SUBCUTANEOUS NIGHTLY
Status: DISCONTINUED | OUTPATIENT
Start: 2021-10-06 | End: 2021-10-17 | Stop reason: HOSPADM

## 2021-10-06 RX ORDER — LISINOPRIL 20 MG/1
20 TABLET ORAL
Status: DISCONTINUED | OUTPATIENT
Start: 2021-10-07 | End: 2021-10-17 | Stop reason: HOSPADM

## 2021-10-06 RX ADMIN — HYDROMORPHONE HYDROCHLORIDE 0.5 MG: 1 INJECTION, SOLUTION INTRAMUSCULAR; INTRAVENOUS; SUBCUTANEOUS at 21:48

## 2021-10-06 RX ADMIN — ONDANSETRON 4 MG: 2 INJECTION INTRAMUSCULAR; INTRAVENOUS at 15:38

## 2021-10-06 RX ADMIN — HYDROMORPHONE HYDROCHLORIDE 0.5 MG: 1 INJECTION, SOLUTION INTRAMUSCULAR; INTRAVENOUS; SUBCUTANEOUS at 18:41

## 2021-10-06 RX ADMIN — HYDROMORPHONE HYDROCHLORIDE 0.5 MG: 1 INJECTION, SOLUTION INTRAMUSCULAR; INTRAVENOUS; SUBCUTANEOUS at 15:38

## 2021-10-06 RX ADMIN — HYDROCODONE BITARTRATE AND ACETAMINOPHEN 1 TABLET: 7.5; 325 TABLET ORAL at 23:38

## 2021-10-06 NOTE — ED NOTES
Patient was wearing a face mask throughout our encounter.  I wore a CAPR throughout the encounter.  Hand hygiene was performed before and after patient encounter.       Pt had a mechanical fall c/o pain to left shoulder and left ankle.  PT denies any LOC, denies hitting head, no thinners.     Guero Hannah RN  10/06/21 0312

## 2021-10-06 NOTE — ED NOTES
Patient was wearing a face mask throughout our encounter.  I wore a CAPR throughout the encounter.  Hand hygiene was performed before and after patient encounter.       Pt had a mechanical fall c/o pain to left shoulder and left ankle.  PT denies any LOC, denies hitting head, no thinners.     Guero Hannah RN  10/06/21 1888

## 2021-10-06 NOTE — ED PROVIDER NOTES
EMERGENCY DEPARTMENT ENCOUNTER    Room Number:  A02/02  Date of encounter:  10/6/2021  PCP: Kadeem Arteaga Jr., MD  Historian: Patient      PPE    Patient was placed in face mask in first look. Patient was wearing facemask when I entered the room and throughout our encounter. I wore full protective equipment throughout this patient encounter including a CAPR  face mask, and gloves. Hand hygiene was performed before donning protective equipment and after removal when leaving the room.        HPI:  Chief Complaint: Fall  A complete HPI/ROS/PMH/PSH/SH/FH are unobtainable due to: Nothing    Context: Jessica Barnes is a 65 y.o. female who arrives to the ED via EMS from home.  Patient presents with c/o moderate, constant, throbbing left shoulder pain status post a fall prior to arrival.  Patient states that her steps were slippery, she slipped and landed on her left shoulder while carrying in groceries.   Patient also complains of left lower leg and ankle pain.  Patient denies hitting her head, LOC, neck pain, back pain or any other injuries.  Patient states that fentanyl she received from EMS made the symptoms better temporarily and movement worsens symptoms.          PAST MEDICAL HISTORY  Active Ambulatory Problems     Diagnosis Date Noted   • Small bowel obstruction (HCC) 2021     Resolved Ambulatory Problems     Diagnosis Date Noted   • No Resolved Ambulatory Problems     Past Medical History:   Diagnosis Date   • Cataract    • Diabetes mellitus (CMS/HCC)    • Hyperlipemia    • Hypertension    • Osteoarthritis of knee, unilateral    • PONV (postoperative nausea and vomiting)    • Torn ligament          PAST SURGICAL HISTORY  Past Surgical History:   Procedure Laterality Date   •  SECTION     • CHOLECYSTECTOMY     • EXPLORATORY LAPAROTOMY N/A 2021    Procedure: LAPAROTOMY EXPLORATORY FOR BOWEL OBSTRUCTION,LYSIS OF ADHESIONS,PRIMARY VENTRAL HERNIA REPAIR;  Surgeon: Alie Addison MD;  Location:   [facility];  Service: General;  Laterality: N/A;   • KNEE ARTHROSCOPY           FAMILY HISTORY  No family history on file.      SOCIAL HISTORY  Social History     Socioeconomic History   • Marital status:      Spouse name: Not on file   • Number of children: Not on file   • Years of education: Not on file   • Highest education level: Not on file   Tobacco Use   • Smoking status: Never Smoker   • Smokeless tobacco: Never Used   Vaping Use   • Vaping Use: Never used   Substance and Sexual Activity   • Alcohol use: No   • Drug use: No   • Sexual activity: Defer         ALLERGIES  Latex, Penicillins, and Sulfa antibiotics        REVIEW OF SYSTEMS  Review of Systems     All systems reviewed and negative except for those discussed in HPI.        PHYSICAL EXAM    ED Triage Vitals [10/06/21 1418]   Temp Heart Rate Resp BP SpO2   97.3 °F (36.3 °C) 70 16 136/57 96 %       Physical Exam  GENERAL: Well appearing, non-toxic appearing, mildly distressed secondary to pain  HENT: normocephalic, atraumatic  EYES: no scleral icterus, PERRL  CV: regular rhythm, regular rate, no murmur  RESPIRATORY: normal effort, CTAB  ABDOMEN: soft   MUSCULOSKELETAL:   No cervical, thoracic or lumbar vertebral tenderness to palpation  No step off or crepitus noted  Deformity noted to the left shoulder with tenderness to palpation, decreased range of motion.  2+ radial pulse, normal sensation to the right arm, soft compartments of the right forearm, no other tenderness to palpation noted.  Tenderness to the left lateral ankle, no deformity noted, 2+ DP and PT pulse on the left.  Soft compartments to the left lower leg  NEURO: alert, moves all extremities, follows commands, mental status normal/baseline  SKIN: warm, dry, no rash   Psych: Appropriate mood and affect  Nursing notes and vital signs reviewed      LAB RESULTS  Recent Results (from the past 24 hour(s))   POC Glucose Once    Collection Time: 10/06/21  4:35 PM    Specimen: Blood    Result Value Ref Range    Glucose 101 70 - 130 mg/dL   Comprehensive Metabolic Panel    Collection Time: 10/06/21  5:45 PM    Specimen: Blood   Result Value Ref Range    Glucose 101 (H) 65 - 99 mg/dL    BUN 22 8 - 23 mg/dL    Creatinine 0.63 0.57 - 1.00 mg/dL    Sodium 139 136 - 145 mmol/L    Potassium 3.6 3.5 - 5.2 mmol/L    Chloride 100 98 - 107 mmol/L    CO2 26.7 22.0 - 29.0 mmol/L    Calcium 9.8 8.6 - 10.5 mg/dL    Total Protein 6.8 6.0 - 8.5 g/dL    Albumin 4.20 3.50 - 5.20 g/dL    ALT (SGPT) 20 1 - 33 U/L    AST (SGOT) 54 (H) 1 - 32 U/L    Alkaline Phosphatase 99 39 - 117 U/L    Total Bilirubin 0.3 0.0 - 1.2 mg/dL    eGFR Non African Amer 95 >60 mL/min/1.73    Globulin 2.6 gm/dL    A/G Ratio 1.6 g/dL    BUN/Creatinine Ratio 34.9 (H) 7.0 - 25.0    Anion Gap 12.3 5.0 - 15.0 mmol/L   COVID-19,[facility] IN-HOUSE CEPHEID/MARICEL NP SWAB IN TRANSPORT MEDIA 8-12 HR TAT - Swab, Nasopharynx    Collection Time: 10/06/21  5:45 PM    Specimen: Nasopharynx; Swab   Result Value Ref Range    COVID19 Not Detected Not Detected - Ref. Range   CBC Auto Differential    Collection Time: 10/06/21  5:45 PM    Specimen: Blood   Result Value Ref Range    WBC 17.14 (H) 3.40 - 10.80 10*3/mm3    RBC 4.98 3.77 - 5.28 10*6/mm3    Hemoglobin 13.8 12.0 - 15.9 g/dL    Hematocrit 42.5 34.0 - 46.6 %    MCV 85.3 79.0 - 97.0 fL    MCH 27.7 26.6 - 33.0 pg    MCHC 32.5 31.5 - 35.7 g/dL    RDW 14.0 12.3 - 15.4 %    RDW-SD 43.6 37.0 - 54.0 fl    MPV 10.3 6.0 - 12.0 fL    Platelets 210 140 - 450 10*3/mm3    Neutrophil % 87.3 (H) 42.7 - 76.0 %    Lymphocyte % 7.4 (L) 19.6 - 45.3 %    Monocyte % 4.1 (L) 5.0 - 12.0 %    Eosinophil % 0.2 (L) 0.3 - 6.2 %    Basophil % 0.2 0.0 - 1.5 %    Immature Grans % 0.8 (H) 0.0 - 0.5 %    Neutrophils, Absolute 14.95 (H) 1.70 - 7.00 10*3/mm3    Lymphocytes, Absolute 1.27 0.70 - 3.10 10*3/mm3    Monocytes, Absolute 0.71 0.10 - 0.90 10*3/mm3    Eosinophils, Absolute 0.03 0.00 - 0.40 10*3/mm3    Basophils, Absolute 0.04 0.00 -  0.20 10*3/mm3    Immature Grans, Absolute 0.14 (H) 0.00 - 0.05 10*3/mm3    nRBC 0.0 0.0 - 0.2 /100 WBC       Ordered the above labs and independently reviewed the results.      RADIOLOGY  XR Shoulder 2+ View Left    Result Date: 10/6/2021  XR SHOULDER 2+ VW LEFT-  INDICATIONS: Trauma  TECHNIQUE: 2 views of the left shoulder  COMPARISON: None available  FINDINGS:  A comminuted fracture involving the left humeral neck is noted. No other fractures are identified. No dislocation is seen.       Fracture of the proximal left humerus.  This report was finalized on 10/6/2021 4:44 PM by Dr. Stanton Chan M.D.      XR Ankle 3+ View Left, XR Tibia Fibula 2 View Left    Result Date: 10/6/2021  XR ANKLE 3+ VW LEFT-, XR TIBIA FIBULA 2 VW LEFT-  INDICATIONS: Trauma  TECHNIQUE: 3 views of the left ankle, 4 views of the left lower leg  COMPARISON: None available  FINDINGS:  A fracture at the level of the distal fibular metaphysis shows about 2 mm cortical step-off, adjacent soft tissue swelling. No other fractures are noted. Moderate calcaneal spurring. Moderate degenerative spurring at the knee. Borderline widening of the medial tibiotalar interval. Arterial calcification is present.       Distal fibular fracture.  This report was finalized on 10/6/2021 4:47 PM by Dr. Stanton Chan M.D.        I ordered the above noted radiological studies and viewed the images on the PACS system.         MEDICAL RECORD REVIEW  Medical records reviewed in epic, no relevant medical records for this visit      PROCEDURES    Procedures        DIFFERENTIAL DIAGNOSIS  Differential Diagnosis for Upper Extremity Problem/Injury include but are not limited to the following:    Contusion of the shoulder/arm/elbow/forearm/wrist/hand/digits  Dislocation of the shoulder/elbow/wrist/digits  Sprains of the shoulder/elbow/wrist/hand/digits  Fractures (open/closed) of the shoulder, humerus, radius, ulna, hand or digits  Laceration or  abrasions  Contusion/fracture/sprain left ankle, Tib/Fib      PROGRESS, DATA ANALYSIS, CONSULTS, AND MEDICAL DECISION MAKING        ED Course as of Oct 06 1908   Wed Oct 06, 2021   1532 Discussed pertinent information from history and physical exam with patient.  Discussed differential diagnosis and plan for ED evaluation/work-up and treatment including x-rays of the left shoulder, left ankle and left tib-fib to evaluate for fracture or other abnormality and pain control.  All questions answered.  Patient is agreeable with this plan.        [MS]   1657 I viewed the patient's Left shoulder and ankle imaging in PACS.  My interpretation is proximal humerus fracture in distal fibula fracture.  See dictation for official radiology interpretation.        [MS]   1735 Consult Note    Discussed care with Dr Albright  Reviewed patient's history, exam, results and need for admission secondary to proximal left humerus fracture and distal left fibular fracture  Dr. Albright accepts the patient to be admitted to [facility] observation bed.        [MS]   1758 Discussed with Dr Orta regarding patient's relevant history, exam, workup and ED findings/concerns.  He agrees to see patient in consult.        [MS]   1908 WBC(!): 17.14 [MS]      ED Course User Index  [MS] Sherrill Arzate, APRN     ADMISSION    Discussed treatment plan and reason for admission with pt/family and admitting physician.  Pt/family voiced understanding of the plan for admission for further testing/treatment as needed.      DIAGNOSIS  Final diagnoses:   Other closed nondisplaced fracture of proximal end of left humerus, initial encounter   Other closed fracture of distal end of left fibula, initial encounter           MEDICATIONS GIVEN IN ED    Medications   HYDROmorphone (DILAUDID) injection 0.5 mg (0.5 mg Intravenous Given 10/6/21 1538)   ondansetron (ZOFRAN) injection 4 mg (4 mg Intravenous Given 10/6/21 1538)   HYDROmorphone (DILAUDID) injection 0.5 mg (0.5  mg Intravenous Given 10/6/21 0508)           COURSE & MEDICAL DECISION MAKING  Any/All labs and Any/All Imaging studies that were ordered were reviewed and are noted above.  Results were reviewed/discussed with the patient and they were also made aware of online access.    Pt also made aware that some labs, such as cultures, will not be resulted during ER visit and follow up with PMD is necessary.        Sherrill Arzate, APRN  10/06/21 1406

## 2021-10-06 NOTE — CASE MANAGEMENT/SOCIAL WORK
Discharge Planning Assessment  [facility]     Patient Name: Jessica Barnes  MRN: [id number]  Today's Date: 10/6/2021    Admit Date: 10/6/2021    Discharge Needs Assessment     Row Name 10/06/21 1814       Living Environment    Lives With  spouse    Name(s) of Who Lives With Patient  Mati Barnes    Current Living Arrangements  home/apartment/condo    Duration at Residence  3 steps into home    Primary Care Provided by  self    Family Caregiver if Needed  spouse    Family Caregiver Names  Mati Barnes    Quality of Family Relationships  helpful;involved;supportive    Living Arrangement Comments  patient has not decided on dc plan at this time       Resource/Environmental Concerns    Resource/Environmental Concerns  none       Transition Planning    Patient/Family Anticipates Transition to  -- to be determined    Patient/Family Anticipated Services at Transition  ;durable medical equipment;skilled nursing;home health care    Transportation Anticipated  family or friend will provide;health plan transportation to be determined       Discharge Needs Assessment    Equipment Currently Used at Home  none    Concerns to be Addressed  adjustment to diagnosis/illness;decision-making;discharge planning;home safety    Concerns Comments  due to patient injuries sustained- continue evaluating d/c needs    Anticipated Changes Related to Illness  inability to care for self    Equipment Needed After Discharge  wheelchair, manual please follow for recommended DME needs    Outpatient/Agency/Support Group Needs  -- to be determined    Provided Post Acute Provider List?  Yes    Delivered To  Support Person;Patient    Support Person  Mati Barnes    Method of Delivery  In person    Current Discharge Risk  dependent with mobility/activities of daily living;physical impairment        Discharge Plan     Row Name 10/06/21 4735       Plan    Plan Comments  Entered room, introduced self and explained role to patient and spouse at [facility]  w/PPE in place on self and others in room; received permission to discuss plans in front of spouse; verified information on facesheet; patient lives in a house w/spouse; is independent w/ADL's, still drives; fell today and sustained injuries to left shoulder and left ankle; patient has 3 steps to enter home; [facility] [address]; provided RTR for review, discussed d/c options; patient is uncertain at this time what DME she needs or if she would need to go to rehab or home with assistance; CCP to follow for further needs. Michell Silva RN          Continued Care and Services - Admitted Since 10/6/2021    Coordination has not been started for this encounter.         Demographic Summary     Row Name 10/06/21 1811       General Information    Admission Type  observation    Arrived From  home    Required Notices Provided  Observation Status Notice    Reason for Consult  decision-making;discharge planning    Preferred Language  English     Used During This Interaction  no       Contact Information    Permission Granted to Share Info With  ;family/designee spouse at bedside        Functional Status     Row Name 10/06/21 1812       Functional Status    Usual Activity Tolerance  excellent    Current Activity Tolerance  poor       Functional Status, IADL    Medications  independent    Meal Preparation  independent    Housekeeping  independent    Laundry  independent    Shopping  independent    IADL Comments  patient is independent w/adls, still drives       Mental Status    General Appearance WDL  WDL       Mental Status Summary    Recent Changes in Mental Status/Cognitive Functioning  no changes       Employment/    Employment Status  retired        Psychosocial    No documentation.       Abuse/Neglect    No documentation.       Legal    No documentation.       Substance Abuse    No documentation.       Patient Forms    No documentation.           Michell Silva RN

## 2021-10-06 NOTE — H&P
HISTORY AND PHYSICAL   [facility]        Date of Admission: 10/6/2021  Patient Identification:  Name: Jessica Barnes  Age: 65 y.o.  Sex: female  :  1955  MRN: [id number]                     Primary Care Physician: Kadeem Arteaga Jr., MD    Chief Complaint:  65 year old female who was carrying in groceries when she tripped and fell; she struck her left shoulder and leg; she slipped going up some wet steps; she is having pain of her left arm and shoulder; denies syncope    History of Present Illness:   As above    Past Medical History:  Past Medical History:   Diagnosis Date   • Cataract    • Diabetes mellitus (CMS/HCC)    • Hyperlipemia    • Hypertension    • Osteoarthritis of knee, unilateral    • PONV (postoperative nausea and vomiting)    • Torn ligament     RIGHT ANKLE     Past Surgical History:  Past Surgical History:   Procedure Laterality Date   •  SECTION     • CHOLECYSTECTOMY     • EXPLORATORY LAPAROTOMY N/A 2021    Procedure: LAPAROTOMY EXPLORATORY FOR BOWEL OBSTRUCTION,LYSIS OF ADHESIONS,PRIMARY VENTRAL HERNIA REPAIR;  Surgeon: Alie Addison MD;  Location: [facility];  Service: General;  Laterality: N/A;   • KNEE ARTHROSCOPY        Home Meds:  (Not in a hospital admission)      Allergies:  Allergies   Allergen Reactions   • Latex Other (See Comments)     BLISTERS ON ARM   • Penicillins Other (See Comments)     UNKNOWN   • Sulfa Antibiotics Hives     Immunizations:    There is no immunization history on file for this patient.  Social History:   Social History     Social History Narrative   • Not on file     Social History     Socioeconomic History   • Marital status:      Spouse name: Not on file   • Number of children: Not on file   • Years of education: Not on file   • Highest education level: Not on file   Tobacco Use   • Smoking status: Never Smoker   • Smokeless tobacco: Never Used   Vaping Use   • Vaping Use: Never used   Substance and Sexual  Activity   • Alcohol use: No   • Drug use: No   • Sexual activity: Defer       Family History:  No family history on file.     Review of Systems  See history of present illness and past medical history.  Patient denies headache, dizziness, syncope,  change in vision, change in hearing, change in taste, changes in weight, changes in appetite, focal weakness, numbness, or paresthesia.  Patient denies chest pain, palpitations, dyspnea, orthopnea, PND, cough, sinus pressure, rhinorrhea, epistaxis, hemoptysis, nausea, vomiting,hematemesis, diarrhea, constipation or hematchezia.  Denies cold or heat intolerance, polydipsia, polyuria, polyphagia. Denies hematuria, pyuria, dysuria, hesitancy, frequency or urgency. Denies consumption of raw and under cooked meats foods or change in water source.  Denies fever, chills, sweats, night sweats.  Denies missing any routine medications. Remainder of ROS is negative.    Objective:  T Max 24 hrs: Temp (24hrs), Av.3 °F (36.3 °C), Min:97.3 °F (36.3 °C), Max:97.3 °F (36.3 °C)    Vitals Ranges:   Temp:  [97.3 °F (36.3 °C)] 97.3 °F (36.3 °C)  Heart Rate:  [69-83] 83  Resp:  [16] 16  BP: (124-139)/(57-77) 139/59      Exam:  /59 (BP Location: Right arm)   Pulse 83   Temp 97.3 °F (36.3 °C) (Tympanic)   Resp 16   SpO2 93%     General Appearance:    Alert, cooperative, no distress, appears stated age   Head:    Normocephalic, without obvious abnormality, atraumatic   Eyes:    PERRL, conjunctivae/corneas clear, EOM's intact, both eyes   Ears:    Normal external ear canals, both ears   Nose:   Nares normal, septum midline, mucosa normal, no drainage    or sinus tenderness   Throat:   Lips, mucosa, and tongue normal   Neck:   Supple, symmetrical, trachea midline, no adenopathy;     thyroid:  no enlargement/tenderness/nodules; no carotid    bruit or JVD   Back:     Symmetric, no curvature, ROM normal, no CVA tenderness   Lungs:     Clear to auscultation bilaterally, respirations  unlabored   Chest Wall:    No tenderness or deformity    Heart:    Regular rate and rhythm, S1 and S2 normal, no murmur, rub   or gallop   Abdomen:     Soft, nontender, bowel sounds active all four quadrants,     no masses, no hepatomegaly, no splenomegaly   Extremities:   Extremities normal, atraumatic, left arm with sling; left leg with splint   Pulses:   2+ and symmetric all extremities   Skin:   Skin color, texture, turgor normal, no rashes or lesions   Lymph nodes:   Cervical, supraclavicular, and axillary nodes normal   Neurologic:   CNII-XII intact, normal strength, sensation intact throughout      .    Data Review:  Labs in chart were reviewed.  WBC   Date Value Ref Range Status   10/06/2021 17.14 (H) 3.40 - 10.80 10*3/mm3 Final     Hemoglobin   Date Value Ref Range Status   10/06/2021 13.8 12.0 - 15.9 g/dL Final     Hematocrit   Date Value Ref Range Status   10/06/2021 42.5 34.0 - 46.6 % Final     Platelets   Date Value Ref Range Status   10/06/2021 210 140 - 450 10*3/mm3 Final     Sodium   Date Value Ref Range Status   10/06/2021 139 136 - 145 mmol/L Final     Potassium   Date Value Ref Range Status   10/06/2021 3.6 3.5 - 5.2 mmol/L Final     Chloride   Date Value Ref Range Status   10/06/2021 100 98 - 107 mmol/L Final     CO2   Date Value Ref Range Status   10/06/2021 26.7 22.0 - 29.0 mmol/L Final     BUN   Date Value Ref Range Status   10/06/2021 22 8 - 23 mg/dL Final     Creatinine   Date Value Ref Range Status   10/06/2021 0.63 0.57 - 1.00 mg/dL Final     Glucose   Date Value Ref Range Status   10/06/2021 101 (H) 65 - 99 mg/dL Final     Calcium   Date Value Ref Range Status   10/06/2021 9.8 8.6 - 10.5 mg/dL Final     AST (SGOT)   Date Value Ref Range Status   10/06/2021 54 (H) 1 - 32 U/L Final     ALT (SGPT)   Date Value Ref Range Status   10/06/2021 20 1 - 33 U/L Final     Alkaline Phosphatase   Date Value Ref Range Status   10/06/2021 99 39 - 117 U/L Final                Imaging Results (All)      Procedure Component Value Units Date/Time    XR Ankle 3+ View Left [[id number]] Collected: 10/06/21 1645     Updated: 10/06/21 1650    Narrative:      XR ANKLE 3+ VW LEFT-, XR TIBIA FIBULA 2 VW LEFT-     INDICATIONS: Trauma     TECHNIQUE: 3 views of the left ankle, 4 views of the left lower leg     COMPARISON: None available     FINDINGS:     A fracture at the level of the distal fibular metaphysis shows about 2  mm cortical step-off, adjacent soft tissue swelling. No other fractures  are noted. Moderate calcaneal spurring. Moderate degenerative spurring  at the knee. Borderline widening of the medial tibiotalar interval.  Arterial calcification is present.       Impression:         Distal fibular fracture.     This report was finalized on 10/6/2021 4:47 PM by Dr. Stanton Chan M.D.       XR Tibia Fibula 2 View Left [[id number]] Collected: 10/06/21 1645     Updated: 10/06/21 1650    Narrative:      XR ANKLE 3+ VW LEFT-, XR TIBIA FIBULA 2 VW LEFT-     INDICATIONS: Trauma     TECHNIQUE: 3 views of the left ankle, 4 views of the left lower leg     COMPARISON: None available     FINDINGS:     A fracture at the level of the distal fibular metaphysis shows about 2  mm cortical step-off, adjacent soft tissue swelling. No other fractures  are noted. Moderate calcaneal spurring. Moderate degenerative spurring  at the knee. Borderline widening of the medial tibiotalar interval.  Arterial calcification is present.       Impression:         Distal fibular fracture.     This report was finalized on 10/6/2021 4:47 PM by Dr. Stanton Chan M.D.       XR Shoulder 2+ View Left [[id number]] Collected: 10/06/21 1642     Updated: 10/06/21 1648    Narrative:      XR SHOULDER 2+ VW LEFT-     INDICATIONS: Trauma     TECHNIQUE: 2 views of the left shoulder     COMPARISON: None available     FINDINGS:     A comminuted fracture involving the left humeral neck is noted. No other  fractures are identified. No dislocation is seen.        Impression:         Fracture of the proximal left humerus.     This report was finalized on 10/6/2021 4:44 PM by Dr. Stanton Chan M.D.           Patient Active Problem List   Diagnosis Code   • Small bowel obstruction (HCC) K56.609   • Closed fracture of left proximal humerus S42.202A       Assessment:    Closed fracture of left proximal humerus  diabetes  Obesity  hypertension    Plan:  Ortho to see  Physical therapy to see  No history of heart disease so should be fine for surgical intervention if planned  accu checks, insulin sliding scale  Control blood pressure  D.w patient, family and ED provider    Crissy Albright MD  10/6/2021  19:34 EDT

## 2021-10-07 ENCOUNTER — APPOINTMENT (OUTPATIENT)
Dept: CT IMAGING | Facility: HOSPITAL | Age: 66
End: 2021-10-07

## 2021-10-07 PROBLEM — S82.832A CLOSED FRACTURE OF LEFT DISTAL FIBULA: Status: ACTIVE | Noted: 2021-10-06

## 2021-10-07 LAB
ANION GAP SERPL CALCULATED.3IONS-SCNC: 14.5 MMOL/L (ref 5–15)
BASOPHILS # BLD AUTO: 0.04 10*3/MM3 (ref 0–0.2)
BASOPHILS NFR BLD AUTO: 0.4 % (ref 0–1.5)
BUN SERPL-MCNC: 23 MG/DL (ref 8–23)
BUN/CREAT SERPL: 40.4 (ref 7–25)
CALCIUM SPEC-SCNC: 9.4 MG/DL (ref 8.6–10.5)
CHLORIDE SERPL-SCNC: 101 MMOL/L (ref 98–107)
CO2 SERPL-SCNC: 22.5 MMOL/L (ref 22–29)
CREAT SERPL-MCNC: 0.57 MG/DL (ref 0.57–1)
DEPRECATED RDW RBC AUTO: 44.7 FL (ref 37–54)
EOSINOPHIL # BLD AUTO: 0.11 10*3/MM3 (ref 0–0.4)
EOSINOPHIL NFR BLD AUTO: 1.1 % (ref 0.3–6.2)
ERYTHROCYTE [DISTWIDTH] IN BLOOD BY AUTOMATED COUNT: 14.2 % (ref 12.3–15.4)
GFR SERPL CREATININE-BSD FRML MDRD: 106 ML/MIN/1.73
GLUCOSE BLDC GLUCOMTR-MCNC: 111 MG/DL (ref 70–130)
GLUCOSE BLDC GLUCOMTR-MCNC: 121 MG/DL (ref 70–130)
GLUCOSE BLDC GLUCOMTR-MCNC: 94 MG/DL (ref 70–130)
GLUCOSE BLDC GLUCOMTR-MCNC: 95 MG/DL (ref 70–130)
GLUCOSE SERPL-MCNC: 83 MG/DL (ref 65–99)
HBA1C MFR BLD: 6.6 % (ref 4.8–5.6)
HCT VFR BLD AUTO: 39.3 % (ref 34–46.6)
HGB BLD-MCNC: 12.5 G/DL (ref 12–15.9)
IMM GRANULOCYTES # BLD AUTO: 0.07 10*3/MM3 (ref 0–0.05)
IMM GRANULOCYTES NFR BLD AUTO: 0.7 % (ref 0–0.5)
LYMPHOCYTES # BLD AUTO: 1.79 10*3/MM3 (ref 0.7–3.1)
LYMPHOCYTES NFR BLD AUTO: 17.7 % (ref 19.6–45.3)
MCH RBC QN AUTO: 27.5 PG (ref 26.6–33)
MCHC RBC AUTO-ENTMCNC: 31.8 G/DL (ref 31.5–35.7)
MCV RBC AUTO: 86.4 FL (ref 79–97)
MONOCYTES # BLD AUTO: 0.8 10*3/MM3 (ref 0.1–0.9)
MONOCYTES NFR BLD AUTO: 7.9 % (ref 5–12)
NEUTROPHILS NFR BLD AUTO: 7.33 10*3/MM3 (ref 1.7–7)
NEUTROPHILS NFR BLD AUTO: 72.2 % (ref 42.7–76)
NRBC BLD AUTO-RTO: 0.1 /100 WBC (ref 0–0.2)
PLATELET # BLD AUTO: 226 10*3/MM3 (ref 140–450)
PMV BLD AUTO: 10.2 FL (ref 6–12)
POTASSIUM SERPL-SCNC: 3.7 MMOL/L (ref 3.5–5.2)
RBC # BLD AUTO: 4.55 10*6/MM3 (ref 3.77–5.28)
SODIUM SERPL-SCNC: 138 MMOL/L (ref 136–145)
WBC # BLD AUTO: 10.14 10*3/MM3 (ref 3.4–10.8)

## 2021-10-07 PROCEDURE — 85025 COMPLETE CBC W/AUTO DIFF WBC: CPT | Performed by: INTERNAL MEDICINE

## 2021-10-07 PROCEDURE — 73700 CT LOWER EXTREMITY W/O DYE: CPT

## 2021-10-07 PROCEDURE — G0378 HOSPITAL OBSERVATION PER HR: HCPCS

## 2021-10-07 PROCEDURE — 80048 BASIC METABOLIC PNL TOTAL CA: CPT | Performed by: INTERNAL MEDICINE

## 2021-10-07 PROCEDURE — 36415 COLL VENOUS BLD VENIPUNCTURE: CPT | Performed by: INTERNAL MEDICINE

## 2021-10-07 PROCEDURE — 73200 CT UPPER EXTREMITY W/O DYE: CPT

## 2021-10-07 PROCEDURE — 82962 GLUCOSE BLOOD TEST: CPT

## 2021-10-07 PROCEDURE — 63710000001 INSULIN GLARGINE PER 5 UNITS: Performed by: INTERNAL MEDICINE

## 2021-10-07 PROCEDURE — 25010000002 HYDROMORPHONE PER 4 MG: Performed by: INTERNAL MEDICINE

## 2021-10-07 PROCEDURE — 25010000002 ONDANSETRON PER 1 MG: Performed by: INTERNAL MEDICINE

## 2021-10-07 PROCEDURE — 83036 HEMOGLOBIN GLYCOSYLATED A1C: CPT | Performed by: INTERNAL MEDICINE

## 2021-10-07 RX ORDER — DIPHENHYDRAMINE HCL 25 MG
25 CAPSULE ORAL ONCE
Status: COMPLETED | OUTPATIENT
Start: 2021-10-07 | End: 2021-10-08

## 2021-10-07 RX ADMIN — HYDROCODONE BITARTRATE AND ACETAMINOPHEN 1 TABLET: 7.5; 325 TABLET ORAL at 20:50

## 2021-10-07 RX ADMIN — ONDANSETRON 4 MG: 2 INJECTION INTRAMUSCULAR; INTRAVENOUS at 03:17

## 2021-10-07 RX ADMIN — HYDROMORPHONE HYDROCHLORIDE 0.5 MG: 1 INJECTION, SOLUTION INTRAMUSCULAR; INTRAVENOUS; SUBCUTANEOUS at 03:15

## 2021-10-07 RX ADMIN — HYDROMORPHONE HYDROCHLORIDE 0.5 MG: 1 INJECTION, SOLUTION INTRAMUSCULAR; INTRAVENOUS; SUBCUTANEOUS at 11:36

## 2021-10-07 RX ADMIN — HYDROMORPHONE HYDROCHLORIDE 0.5 MG: 1 INJECTION, SOLUTION INTRAMUSCULAR; INTRAVENOUS; SUBCUTANEOUS at 17:53

## 2021-10-07 RX ADMIN — Medication 1000 UNITS: at 07:56

## 2021-10-07 RX ADMIN — HYDROCODONE BITARTRATE AND ACETAMINOPHEN 1 TABLET: 7.5; 325 TABLET ORAL at 07:57

## 2021-10-07 RX ADMIN — INSULIN GLARGINE 100 UNITS: 100 INJECTION, SOLUTION SUBCUTANEOUS at 22:52

## 2021-10-07 RX ADMIN — ATORVASTATIN CALCIUM 10 MG: 20 TABLET, FILM COATED ORAL at 20:50

## 2021-10-07 RX ADMIN — HYDROCODONE BITARTRATE AND ACETAMINOPHEN 1 TABLET: 7.5; 325 TABLET ORAL at 14:13

## 2021-10-07 RX ADMIN — HYDROMORPHONE HYDROCHLORIDE 0.5 MG: 1 INJECTION, SOLUTION INTRAMUSCULAR; INTRAVENOUS; SUBCUTANEOUS at 23:40

## 2021-10-07 RX ADMIN — ONDANSETRON 4 MG: 2 INJECTION INTRAMUSCULAR; INTRAVENOUS at 14:20

## 2021-10-07 NOTE — PROGRESS NOTES
Name: Jessica Barnes ADMIT: 10/6/2021   : 1955  PCP: aKdeem Arteaga Jr., MD    MRN: [id number] LOS: 0 days   AGE/SEX: 65 y.o. female  ROOM: [facility]     Subjective   Subjective   Complaining of pain, upset about problem need for rehab    Review of Systems   Constitutional: Negative for fever.   Respiratory: Negative for cough and shortness of breath.    Cardiovascular: Negative for chest pain.   Gastrointestinal: Negative for abdominal pain.      Objective   Objective   Vital Signs  Temp:  [96.9 °F (36.1 °C)-98.2 °F (36.8 °C)] 96.9 °F (36.1 °C)  Heart Rate:  [80-85] 85  Resp:  [16] 16  BP: (100-139)/(51-61) 128/57  SpO2:  [91 %-95 %] 94 %  on   ;   Device (Oxygen Therapy): room air  Body mass index is 40.37 kg/m².    Physical Exam  Constitutional:       Appearance: Normal appearance. She is obese.   HENT:      Head: Normocephalic and atraumatic.   Cardiovascular:      Rate and Rhythm: Normal rate and regular rhythm.   Pulmonary:      Effort: Pulmonary effort is normal. No respiratory distress.      Breath sounds: Normal breath sounds.   Abdominal:      General: Bowel sounds are normal. There is no distension.      Palpations: Abdomen is soft.      Tenderness: There is no abdominal tenderness. There is no guarding or rebound.   Musculoskeletal:      Comments: LLE dressed, LUE sling   Skin:     General: Skin is warm and dry.   Neurological:      General: No focal deficit present.      Mental Status: She is alert.   Psychiatric:         Mood and Affect: Mood normal.         Behavior: Behavior normal.     Results Review  I reviewed the patient's new clinical results.  Results from last 7 days   Lab Units 10/07/21  0606 10/06/21  1745   WBC 10*3/mm3 10.14 17.14*   HEMOGLOBIN g/dL 12.5 13.8   PLATELETS 10*3/mm3 226 210     Results from last 7 days   Lab Units 10/07/21  0606 10/06/21  1745   SODIUM mmol/L 138 139   POTASSIUM mmol/L 3.7 3.6   CHLORIDE mmol/L 101 100   CO2 mmol/L 22.5 26.7   BUN mg/dL 23 22    CREATININE mg/dL 0.57 0.63   GLUCOSE mg/dL 83 101*     Lab Results   Component Value Date    ANIONGAP 14.5 10/07/2021     Estimated Creatinine Clearance: 88.2 mL/min (by C-G formula based on SCr of 0.57 mg/dL).    Results from last 7 days   Lab Units 10/06/21  1745   ALBUMIN g/dL 4.20   BILIRUBIN mg/dL 0.3   ALK PHOS U/L 99   AST (SGOT) U/L 54*   ALT (SGPT) U/L 20     Results from last 7 days   Lab Units 10/07/21  0606 10/06/21  1745   CALCIUM mg/dL 9.4 9.8   ALBUMIN g/dL  --  4.20       Hemoglobin A1C   Date/Time Value Ref Range Status   10/07/2021 0606 6.60 (H) 4.80 - 5.60 % Final     Glucose   Date/Time Value Ref Range Status   10/07/2021 1040 94 70 - 130 mg/dL Final     Comment:     Meter: [id number] : [id number] Wolfgang Ortiz CNA   10/07/2021 0602 95 70 - 130 mg/dL Final     Comment:     Meter: [id number] : [id number] Erika SON   10/06/2021 2211 108 70 - 130 mg/dL Final     Comment:     Meter: [id number] : [id number] Kal WALSH RN   10/06/2021 1635 101 70 - 130 mg/dL Final     Comment:     Meter: [id number] : [id number] Lashawn Das RN       Scheduled Meds  amLODIPine, 10 mg, Oral, Q24H  atorvastatin, 10 mg, Oral, Daily  cholecalciferol, 1,000 Units, Oral, Daily  hydroCHLOROthiazide, 50 mg, Oral, Daily  insulin glargine, 100 Units, Subcutaneous, Nightly  insulin lispro, 0-9 Units, Subcutaneous, TID With Meals  insulin lispro, 15 Units, Subcutaneous, BID  lisinopril, 20 mg, Oral, Q24H    Continuous Infusions   PRN Meds  •  acetaminophen **OR** acetaminophen **OR** acetaminophen  •  dextrose  •  dextrose  •  glucagon (human recombinant)  •  HYDROcodone-acetaminophen  •  HYDROmorphone  •  ondansetron     Diet  Diet Regular; Consistent Carbohydrate     I personally reviewed images     Assessment/Plan     Active Hospital Problems    Diagnosis  POA   • **Closed fracture of left distal fibula [S8.292A]  Unknown   • Diabetes mellitus (HCC) [E11.9]  Unknown   • Hypertension [I10]   Unknown   • BMI 40.0-44.9, adult ([facility]) [Z68.41]  Not Applicable   • Closed fracture of left proximal humerus [S42.202A]  Yes      Resolved Hospital Problems   No resolved problems to display.     65 y.o. female admitted with Closed fracture of left distal fibula.    · Mechanical fall without syncope/loss of consciousness  · Left distal fibula fracture  · Left proximal humerus fracture  · Analgesia  · Orthopedic surgery evaluation in progress  · Anticipate surgery: ankle in several days potentially Monday, humerus TBD  · DM 2  · A1c 6.60  · controlled  · Body mass index is 40.37 kg/m².   · SCDs for DVT prophylaxis  · Full code  · Discussed with patient and nursing staff  · Anticipate discharge to SNU facility timing yet to be determined.     Darren Astorga MD  [facility]  10/07/21  15:56 EDT    I wore protective equipment throughout this patient encounter including a face mask, gloves, and protective eyewear.  Hand hygiene was performed before donning protective equipment and after removal when leaving the room.

## 2021-10-07 NOTE — CONSULTS
[facility]   Consult Note    Patient Name: Jessica Barnes  : 1955  MRN: [id number]  Primary Care Physician:  Kadeem Arteaga Jr., MD  Referring Physician: Crissy Albright MD  Date of admission: 10/6/2021    Ortho (on-call MD unless specified)  Consult performed by: Jacinto Orta Jr., MD  Consult ordered by: Sherrill Arzate APRN        Subjective   Subjective     Reason for Consult/ Chief Complaint: left shoulder and ankle pain    History of Present Illness  Jessica Barnes is a 65 y.o. female with history of diabetes, hyperlipidemia, hypertension who presented with left shoulder and left ankle pain following a   Mechanical fall.  She apparently slipped on some wet steps.  She had immediate left ankle pain and left shoulder pain.  She was brought to the emergency department where radiographs showed a mildly displaced left proximal humerus fracture as well as a left distal fibular fracture.  Given her multiple injuries she was admitted to the hospitalist service.  Orthopedics was consulted for evaluation and management.      She localizes pain to the left ankle and left shoulder.  Pain can be a 5 out of 10.  It is dull and aching.  It is worse with activity and better with rest.    Review of Systems   Review of Systems:  Constitutional: Negative  HENT: Negative  Eyes: Negative  Respiratory: Negative; no shortness of breath  Cardiovascular: Negative; no current chest pain  Gastrointestinal: Negative  : Negative  Skin: Negative except as listed in HPI  Neurological: Negative, no numbness or deficits  Hematological: Negative  Muscoloskeletal: Per HPI                     Personal History     Past Medical History:   Diagnosis Date   • Cataract    • Diabetes mellitus (CMS/HCC)    • Hyperlipemia    • Hypertension    • Osteoarthritis of knee, unilateral    • PONV (postoperative nausea and vomiting)    • Torn ligament     RIGHT ANKLE       Past Surgical History:   Procedure Laterality Date   •   SECTION     • CHOLECYSTECTOMY     • EXPLORATORY LAPAROTOMY N/A 2021    Procedure: LAPAROTOMY EXPLORATORY FOR BOWEL OBSTRUCTION,LYSIS OF ADHESIONS,PRIMARY VENTRAL HERNIA REPAIR;  Surgeon: Alie Addison MD;  Location: [facility] OR;  Service: General;  Laterality: N/A;   • KNEE ARTHROSCOPY         Family History: family history is not on file. Otherwise pertinent FHx was reviewed and not pertinent to current issue.    Social History:  reports that she has never smoked. She has never used smokeless tobacco. She reports that she does not drink alcohol and does not use drugs.    Home Medications:   Ergocalciferol, Insulin Glargine, amLODIPine, atorvastatin, benazepril, empagliflozin, hydroCHLOROthiazide, insulin lispro, and ondansetron ODT    Allergies:  Allergies   Allergen Reactions   • Latex Other (See Comments)     BLISTERS ON ARM   • Penicillins Other (See Comments)     UNKNOWN   • Sulfa Antibiotics Hives       Objective    Objective     Vitals:  Temp:  [97.3 °F (36.3 °C)] 97.3 °F (36.3 °C)  Heart Rate:  [69-83] 83  Resp:  [16] 16  BP: (124-139)/(57-77) 139/59    Physical Exam  Physical Exam:  Vital signs reviewed.  Constitutional:  Appears well-developed, well nourished.  HEENT:  Head: normocephalic, atraumatic  Eyes: EOMI, grossly normal.  No scleral icterus.  Neck: Normal range of motion.  Supple, no tracheal deviation.  Cardiovascular: Regular rate.  Pulmonary: Effort normal, symmetric chest expansion, no labored breathing.  Abdominal: Soft, non distended  Neurological: No gross deficits, oriented to person, place, and time.  Skin: Warm and dry  Psychiatric: Normal mood and affect  Musculoskeletal:       Examination of her left shoulder shows a sling to be in place.  Mild swelling and bruising around the shoulder.  She is  tender diffusely around the shoulder girdle.  She does have  active wrist flexion and wrist extension.  Active EPL function.  Sensation intact to light touch in axillary  patch, radial, median, ulnar distribution.  Palpable radial pulse.  Fingers are warm and well perfused with brisk capillary refill.        Examination of her left lower extremity shows a well-padded short leg splint in place.  I have removed the wrapping of the lateral ankle.  No open wounds.  She does have moderate swelling.  Toes are warm and well-perfused with brisk capillary refill.  No pain with passive stretch.  Active toe extension and flexion.    Result Review    Result Review:  Radiographs of the left shoulder are independently reviewed.  These show a comminuted left proximal humerus fracture.      Radiographs of the left ankle are independently reviewed.  These show a moderately displaced left distal fibular fracture.   perhaps mild lateral subluxation of the talus.      Assessment/Plan   Assessment / Plan     Brief Patient Summary:  Jessica Barnes is a 65 y.o. female with history of diabetes, hyperlipidemia, hypertension who presents with a closed displaced left distal fibular fracture and left proximal humerus fracture.    Active Hospital Problems:  Active Hospital Problems    Diagnosis    • Closed fracture of left proximal humerus      Plan:     I had a long discussion with the patient today about her clinical and radiographic findings.  Given the displacement of the distal fibular fracture and subtle widening of the mortise, this will require formal operative stabilization.  She does have moderate swelling over the lateral ankle.  I would likely delay surgery for several days until this can defervesce.  I will order a CT scan for preoperative planning.    In regards to her shoulder, she has a fairly comminuted proximal humerus fracture.  She has had right shoulder surgery by my partner Dr. Ramos in the past.  I will order a CT scan to better evaluate the fracture and discussed the results with him.    -Patient will require for surgery for the ankle per above in several days, potentially Monday.  Please  keep extremity elevated.  Nonweightbearing.  -Left upper extremity: Nonweightbearing in sling.  Will order CT scan per above.  -DVT: Teds/SCDs for now  -Pain control  -Disposition: Patient will likely require rehab placement given multiple extremity injuries/limited weightbearing.  Pending for now.    Thank you for this consultation, please call with questions.    Electronically signed by Jacinto Orta Jr, MD, 10/06/21, 10:10 PM EDT.

## 2021-10-07 NOTE — PLAN OF CARE
Goal Outcome Evaluation:  Plan of Care Reviewed With: patient        Progress: improving  Outcome Summary: VSS stable overnight, ortho consulted to see patient to determine further plan of care

## 2021-10-08 LAB
GLUCOSE BLDC GLUCOMTR-MCNC: 108 MG/DL (ref 70–130)
GLUCOSE BLDC GLUCOMTR-MCNC: 114 MG/DL (ref 70–130)
GLUCOSE BLDC GLUCOMTR-MCNC: 161 MG/DL (ref 70–130)
GLUCOSE BLDC GLUCOMTR-MCNC: 87 MG/DL (ref 70–130)

## 2021-10-08 PROCEDURE — 25010000002 ONDANSETRON PER 1 MG: Performed by: INTERNAL MEDICINE

## 2021-10-08 PROCEDURE — 63710000001 DIPHENHYDRAMINE PER 50 MG: Performed by: NURSE PRACTITIONER

## 2021-10-08 PROCEDURE — 25010000002 HYDROMORPHONE PER 4 MG: Performed by: INTERNAL MEDICINE

## 2021-10-08 PROCEDURE — 63710000001 INSULIN LISPRO (HUMAN) PER 5 UNITS: Performed by: INTERNAL MEDICINE

## 2021-10-08 PROCEDURE — 63710000001 INSULIN GLARGINE PER 5 UNITS: Performed by: INTERNAL MEDICINE

## 2021-10-08 PROCEDURE — 82962 GLUCOSE BLOOD TEST: CPT

## 2021-10-08 RX ADMIN — DIPHENHYDRAMINE HYDROCHLORIDE 25 MG: 25 CAPSULE ORAL at 09:09

## 2021-10-08 RX ADMIN — HYDROCHLOROTHIAZIDE 50 MG: 25 TABLET ORAL at 08:19

## 2021-10-08 RX ADMIN — LISINOPRIL 20 MG: 20 TABLET ORAL at 08:19

## 2021-10-08 RX ADMIN — ONDANSETRON 4 MG: 2 INJECTION INTRAMUSCULAR; INTRAVENOUS at 16:11

## 2021-10-08 RX ADMIN — HYDROMORPHONE HYDROCHLORIDE 0.5 MG: 1 INJECTION, SOLUTION INTRAMUSCULAR; INTRAVENOUS; SUBCUTANEOUS at 11:50

## 2021-10-08 RX ADMIN — ATORVASTATIN CALCIUM 10 MG: 20 TABLET, FILM COATED ORAL at 21:43

## 2021-10-08 RX ADMIN — HYDROCODONE BITARTRATE AND ACETAMINOPHEN 1 TABLET: 7.5; 325 TABLET ORAL at 16:12

## 2021-10-08 RX ADMIN — HYDROCODONE BITARTRATE AND ACETAMINOPHEN 1 TABLET: 7.5; 325 TABLET ORAL at 22:27

## 2021-10-08 RX ADMIN — HYDROCODONE BITARTRATE AND ACETAMINOPHEN 1 TABLET: 7.5; 325 TABLET ORAL at 08:20

## 2021-10-08 RX ADMIN — INSULIN GLARGINE 100 UNITS: 100 INJECTION, SOLUTION SUBCUTANEOUS at 21:38

## 2021-10-08 RX ADMIN — Medication 1000 UNITS: at 08:20

## 2021-10-08 RX ADMIN — AMLODIPINE BESYLATE 10 MG: 10 TABLET ORAL at 08:19

## 2021-10-08 RX ADMIN — ONDANSETRON 4 MG: 2 INJECTION INTRAMUSCULAR; INTRAVENOUS at 22:28

## 2021-10-08 RX ADMIN — INSULIN LISPRO 15 UNITS: 100 INJECTION, SOLUTION INTRAVENOUS; SUBCUTANEOUS at 08:19

## 2021-10-08 NOTE — PROGRESS NOTES
Name: Jessica Barnes ADMIT: 10/6/2021   : 1955  PCP: Kadeem Arteaga Jr., MD    MRN: [id number] LOS: 0 days   AGE/SEX: 65 y.o. female  ROOM: [facility]     Subjective   Subjective   She is upset and nervous about the fact that she may have to have some shoulder surgery and leg surgery.  No other new specific complaints.     Review of Systems   Constitutional: Negative for fever.   Respiratory: Negative for cough and shortness of breath.    Cardiovascular: Negative for chest pain.   Gastrointestinal: Negative for abdominal pain.      Objective   Objective   Vital Signs  Temp:  [97.1 °F (36.2 °C)-98.1 °F (36.7 °C)] 98 °F (36.7 °C)  Heart Rate:  [] 97  Resp:  [16-18] 18  BP: (109-138)/(43-60) 110/43  SpO2:  [91 %-95 %] 93 %  on   ;   Device (Oxygen Therapy): room air  Body mass index is 40.37 kg/m².    Physical Exam  Constitutional:       Appearance: Normal appearance. She is obese.   HENT:      Head: Normocephalic and atraumatic.   Cardiovascular:      Rate and Rhythm: Normal rate and regular rhythm.   Pulmonary:      Effort: Pulmonary effort is normal. No respiratory distress.      Breath sounds: Normal breath sounds.   Abdominal:      General: Bowel sounds are normal. There is no distension.      Palpations: Abdomen is soft.      Tenderness: There is no abdominal tenderness. There is no guarding or rebound.   Musculoskeletal:      Comments: LLE dressed, LUE sling   Skin:     General: Skin is warm and dry.   Neurological:      General: No focal deficit present.      Mental Status: She is alert.   Psychiatric:         Mood and Affect: Mood normal.         Behavior: Behavior normal.     Results Review  I reviewed the patient's new clinical results.  Results from last 7 days   Lab Units 10/07/21  0606 10/06/21  1745   WBC 10*3/mm3 10.14 17.14*   HEMOGLOBIN g/dL 12.5 13.8   PLATELETS 10*3/mm3 226 210     Results from last 7 days   Lab Units 10/07/21  0606 10/06/21  1745   SODIUM mmol/L 138 139   POTASSIUM  mmol/L 3.7 3.6   CHLORIDE mmol/L 101 100   CO2 mmol/L 22.5 26.7   BUN mg/dL 23 22   CREATININE mg/dL 0.57 0.63   GLUCOSE mg/dL 83 101*     Lab Results   Component Value Date    ANIONGAP 14.5 10/07/2021     Estimated Creatinine Clearance: 88.2 mL/min (by C-G formula based on SCr of 0.57 mg/dL).    Results from last 7 days   Lab Units 10/06/21  1745   ALBUMIN g/dL 4.20   BILIRUBIN mg/dL 0.3   ALK PHOS U/L 99   AST (SGOT) U/L 54*   ALT (SGPT) U/L 20     Results from last 7 days   Lab Units 10/07/21  0606 10/06/21  1745   CALCIUM mg/dL 9.4 9.8   ALBUMIN g/dL  --  4.20       Hemoglobin A1C   Date/Time Value Ref Range Status   10/07/2021 0606 6.60 (H) 4.80 - 5.60 % Final     Glucose   Date/Time Value Ref Range Status   10/08/2021 1019 87 70 - 130 mg/dL Final     Comment:     Meter: [id number] : [id number] Wolfgang Hugora CNA   10/08/2021 0608 108 70 - 130 mg/dL Final     Comment:     Meter: [id number] : [id number] Dante Amanda NA   10/07/2021 2107 111 70 - 130 mg/dL Final     Comment:     Meter: [id number] : [id number] Dante Amanda NA   10/07/2021 1556 121 70 - 130 mg/dL Final     Comment:     Meter: [id number] : [id number] Wolfgang Hugora CNA   10/07/2021 1040 94 70 - 130 mg/dL Final     Comment:     Meter: [id number] : [id number] Wolfgang Kellerandra CNA   10/07/2021 0602 95 70 - 130 mg/dL Final     Comment:     Meter: [id number] : [id number] Erika Em NA   10/06/2021 2211 108 70 - 130 mg/dL Final     Comment:     Meter: [id number] : [id number] Kal WALSH RN       Scheduled Meds  amLODIPine, 10 mg, Oral, Q24H  atorvastatin, 10 mg, Oral, Daily  cholecalciferol, 1,000 Units, Oral, Daily  hydroCHLOROthiazide, 50 mg, Oral, Daily  insulin glargine, 100 Units, Subcutaneous, Nightly  insulin lispro, 0-9 Units, Subcutaneous, TID With Meals  insulin lispro, 15 Units, Subcutaneous, BID  lisinopril, 20 mg, Oral, Q24H    Continuous Infusions   PRN Meds  •  acetaminophen **OR** acetaminophen  **OR** acetaminophen  •  dextrose  •  dextrose  •  glucagon (human recombinant)  •  HYDROcodone-acetaminophen  •  HYDROmorphone  •  ondansetron     Diet  Diet Regular; Consistent Carbohydrate     I personally reviewed images     Assessment/Plan     Active Hospital Problems    Diagnosis  POA   • **Closed fracture of left distal fibula [S82.832A]  Unknown   • Diabetes mellitus (HCC) [E11.9]  Unknown   • Hypertension [I10]  Unknown   • BMI 40.0-44.9, adult (HCC) [Z68.41]  Not Applicable   • Closed fracture of left proximal humerus [S42.202A]  Yes      Resolved Hospital Problems   No resolved problems to display.     65 y.o. female admitted with Closed fracture of left distal fibula.    · Mechanical fall without syncope/loss of consciousness  · Left distal fibula fracture  · Left proximal humerus fracture  · Analgesia  · Lab stable so break for today. Repeat Sunday prior to surgery.   · Orthopedic surgery evaluation in progress  · Anticipate surgery: ankle in several days potentially Monday, humerus TBD  · Add I-S  · DM 2  · A1c 6.60  · controlled  · Body mass index is 40.37 kg/m².   · SCDs for DVT prophylaxis  · Full code  · Discussed with patient and nursing staff  · Anticipate discharge to SNU facility timing yet to be determined.     LIAM Macdonald  [facility]  10/08/21  14:35 EDT    I wore protective equipment throughout this patient encounter including a face mask, gloves, and protective eyewear.  Hand hygiene was performed before donning protective equipment and after removal when leaving the room.

## 2021-10-08 NOTE — PLAN OF CARE
Goal Outcome Evaluation:  Plan of Care Reviewed With: patient        Progress: no change  Outcome Summary: patient had a stable night. Pain controlled with pain meds. vitals stable. NWB maintained to LUE and LLE/kept elevated. Neuro vacsular checks intact. voiding per jeffrey. Educated on blood sugar monitoring, verbalises understanding. Will continue to monitor.

## 2021-10-08 NOTE — PLAN OF CARE
Goal Outcome Evaluation:  Plan of Care Reviewed With: patient        Progress: improving  Outcome Summary: vss. ra. voiding per pw. pain tolerated with po and iv prn meds. achs with ssi, no corrections needed today. NWB to LUE and LLE. plan for surgery Monday 10/13. educated on glucose monitoring.

## 2021-10-08 NOTE — PROGRESS NOTES
"Orthopaedic Surgery  Daily Progress Note    /62 (BP Location: Right arm, Patient Position: Lying)   Pulse 96   Temp 98 °F (36.7 °C) (Skin)   Resp 18   Ht 167.6 cm (65.98\")   Wt 113 kg (250 lb)   SpO2 94%   BMI 40.37 kg/m²     Lab Results (last 24 hours)     Procedure Component Value Units Date/Time    POC Glucose Once [[id number]]  (Normal) Collected: 10/08/21 1553    Specimen: Blood Updated: 10/08/21 1559     Glucose 114 mg/dL      Comment: Meter: [id number] : [id number] Wolfgang Diana CNA       POC Glucose Once [[id number]]  (Normal) Collected: 10/08/21 1019    Specimen: Blood Updated: 10/08/21 1034     Glucose 87 mg/dL      Comment: Meter: [id number] : [id number] Wolfgang Diana CNA       POC Glucose Once [[id number]]  (Normal) Collected: 10/08/21 0608    Specimen: Blood Updated: 10/08/21 0612     Glucose 108 mg/dL      Comment: Meter: [id number] : [id number] Dante Amanda NA       POC Glucose Once [[id number]]  (Normal) Collected: 10/07/21 2107    Specimen: Blood Updated: 10/07/21 2108     Glucose 111 mg/dL      Comment: Meter: [id number] : [id number] Dante Amanda NA             Imaging Results (Last 24 Hours)     ** No results found for the last 24 hours. **          Patient Care Team:  Kadeem Arteaga Jr., MD as PCP - General (Family Medicine)    SUBJECTIVE  Pain controlled    PHYSICAL EXAM  Resting in NAD  Splint clean, dry, intact  Toes warm, perfused, brisk capillary refill  Flexes/extends toes   SILT over toes  No pain with passive stretch           Closed fracture of left distal fibula    Closed fracture of left proximal humerus    Diabetes mellitus (HCC)    Hypertension    BMI 40.0-44.9, adult (HCC)      PLAN / DISPOSITION:    I had a long discussion with the patient today.  I have reviewed her shoulder images with my partner Dr. Ramos.  He feels she would be best treated with a left reverse shoulder arthroplasty.     we are trying to coordinate surgical schedules " next week.  I have currently scheduled her for open reduction internal fixation of her ankle on Wednesday morning.  Dr. Ramos is working to schedule left reverse total shoulder arthroplasty.        -Left upper extremity: Nonweightbearing in sling.    Dr. Ramos to coordinate surgery.  -Left ankle: Currently scheduled for surgery Wednesday morning.  Nonweightbearing in splint.  -DVT: Teds/SCDs for now  -Pain control  -Disposition: Patient will likely require rehab placement given multiple extremity injuries/limited weightbearing.  Pending for now.    Jacinto Orta Jr, MD  10/08/21  16:57 EDT

## 2021-10-08 NOTE — CASE MANAGEMENT/SOCIAL WORK
Continued Stay Note  [facility]     Patient Name: Jessica Barnes  MRN: [id number]  Today's Date: 10/8/2021    Admit Date: 10/6/2021    Discharge Plan     Row Name 10/08/21 1151       Plan    Plan  Surgery on Monday; follow for SNF choices    Provided Post Acute Provider Quality & Resource List?  Yes    Post Acute Provider Quality and Resource List  Nursing Home    Delivered To  Patient    Plan Comments  CCP met with patient at bedside. Plans for surgery Monday. CCP discussed SNF at discharge; patient agreeable and requested 4-5 star facility list. CCP provided Medicare ratings to patient to review. CCP encouraged patient to select 3-4 facilities for referrals. CCP will follow up after surgery and assist as needed. Lulu GUNTER        Discharge Codes    No documentation.             JANI Dhillon

## 2021-10-09 PROBLEM — E87.1 HYPONATREMIA: Status: ACTIVE | Noted: 2021-10-09

## 2021-10-09 PROBLEM — K59.03 DRUG-INDUCED CONSTIPATION: Status: ACTIVE | Noted: 2021-10-09

## 2021-10-09 LAB
ALBUMIN SERPL-MCNC: 3.4 G/DL (ref 3.5–5.2)
ANION GAP SERPL CALCULATED.3IONS-SCNC: 10 MMOL/L (ref 5–15)
BUN SERPL-MCNC: 43 MG/DL (ref 8–23)
BUN/CREAT SERPL: 60.6 (ref 7–25)
CALCIUM SPEC-SCNC: 8.6 MG/DL (ref 8.6–10.5)
CHLORIDE SERPL-SCNC: 94 MMOL/L (ref 98–107)
CO2 SERPL-SCNC: 25 MMOL/L (ref 22–29)
CREAT SERPL-MCNC: 0.71 MG/DL (ref 0.57–1)
DEPRECATED RDW RBC AUTO: 41.6 FL (ref 37–54)
ERYTHROCYTE [DISTWIDTH] IN BLOOD BY AUTOMATED COUNT: 13.8 % (ref 12.3–15.4)
GFR SERPL CREATININE-BSD FRML MDRD: 82 ML/MIN/1.73
GLUCOSE BLDC GLUCOMTR-MCNC: 103 MG/DL (ref 70–130)
GLUCOSE BLDC GLUCOMTR-MCNC: 115 MG/DL (ref 70–130)
GLUCOSE BLDC GLUCOMTR-MCNC: 137 MG/DL (ref 70–130)
GLUCOSE BLDC GLUCOMTR-MCNC: 142 MG/DL (ref 70–130)
GLUCOSE SERPL-MCNC: 117 MG/DL (ref 65–99)
HCT VFR BLD AUTO: 34.2 % (ref 34–46.6)
HGB BLD-MCNC: 11.4 G/DL (ref 12–15.9)
MAGNESIUM SERPL-MCNC: 2.2 MG/DL (ref 1.6–2.4)
MCH RBC QN AUTO: 27.7 PG (ref 26.6–33)
MCHC RBC AUTO-ENTMCNC: 33.3 G/DL (ref 31.5–35.7)
MCV RBC AUTO: 83.2 FL (ref 79–97)
PHOSPHATE SERPL-MCNC: 2.9 MG/DL (ref 2.5–4.5)
PLATELET # BLD AUTO: 200 10*3/MM3 (ref 140–450)
PMV BLD AUTO: 9.8 FL (ref 6–12)
POTASSIUM SERPL-SCNC: 3.5 MMOL/L (ref 3.5–5.2)
RBC # BLD AUTO: 4.11 10*6/MM3 (ref 3.77–5.28)
SODIUM SERPL-SCNC: 129 MMOL/L (ref 136–145)
WBC # BLD AUTO: 10.63 10*3/MM3 (ref 3.4–10.8)

## 2021-10-09 PROCEDURE — 25010000002 ONDANSETRON PER 1 MG: Performed by: INTERNAL MEDICINE

## 2021-10-09 PROCEDURE — 85027 COMPLETE CBC AUTOMATED: CPT | Performed by: INTERNAL MEDICINE

## 2021-10-09 PROCEDURE — 80069 RENAL FUNCTION PANEL: CPT | Performed by: INTERNAL MEDICINE

## 2021-10-09 PROCEDURE — 63710000001 INSULIN GLARGINE PER 5 UNITS: Performed by: INTERNAL MEDICINE

## 2021-10-09 PROCEDURE — 82962 GLUCOSE BLOOD TEST: CPT

## 2021-10-09 PROCEDURE — 63710000001 INSULIN LISPRO (HUMAN) PER 5 UNITS: Performed by: INTERNAL MEDICINE

## 2021-10-09 PROCEDURE — 83735 ASSAY OF MAGNESIUM: CPT | Performed by: INTERNAL MEDICINE

## 2021-10-09 RX ORDER — CALCIUM CARBONATE 200(500)MG
1 TABLET,CHEWABLE ORAL 3 TIMES DAILY PRN
Status: DISCONTINUED | OUTPATIENT
Start: 2021-10-09 | End: 2021-10-17 | Stop reason: HOSPADM

## 2021-10-09 RX ORDER — POLYETHYLENE GLYCOL 3350 17 G/17G
17 POWDER, FOR SOLUTION ORAL DAILY
Status: DISCONTINUED | OUTPATIENT
Start: 2021-10-09 | End: 2021-10-10

## 2021-10-09 RX ORDER — BISACODYL 5 MG/1
10 TABLET, DELAYED RELEASE ORAL DAILY PRN
Status: DISCONTINUED | OUTPATIENT
Start: 2021-10-09 | End: 2021-10-17 | Stop reason: HOSPADM

## 2021-10-09 RX ADMIN — HYDROCODONE BITARTRATE AND ACETAMINOPHEN 1 TABLET: 7.5; 325 TABLET ORAL at 22:04

## 2021-10-09 RX ADMIN — INSULIN LISPRO 15 UNITS: 100 INJECTION, SOLUTION INTRAVENOUS; SUBCUTANEOUS at 17:31

## 2021-10-09 RX ADMIN — HYDROCHLOROTHIAZIDE 50 MG: 25 TABLET ORAL at 09:51

## 2021-10-09 RX ADMIN — ATORVASTATIN CALCIUM 10 MG: 20 TABLET, FILM COATED ORAL at 21:57

## 2021-10-09 RX ADMIN — HYDROCODONE BITARTRATE AND ACETAMINOPHEN 1 TABLET: 7.5; 325 TABLET ORAL at 04:00

## 2021-10-09 RX ADMIN — LISINOPRIL 20 MG: 20 TABLET ORAL at 09:51

## 2021-10-09 RX ADMIN — POLYETHYLENE GLYCOL 3350 17 G: 17 POWDER, FOR SOLUTION ORAL at 12:10

## 2021-10-09 RX ADMIN — HYDROCODONE BITARTRATE AND ACETAMINOPHEN 1 TABLET: 7.5; 325 TABLET ORAL at 09:52

## 2021-10-09 RX ADMIN — Medication 1000 UNITS: at 09:51

## 2021-10-09 RX ADMIN — AMLODIPINE BESYLATE 10 MG: 10 TABLET ORAL at 09:51

## 2021-10-09 RX ADMIN — HYDROCODONE BITARTRATE AND ACETAMINOPHEN 1 TABLET: 7.5; 325 TABLET ORAL at 15:28

## 2021-10-09 RX ADMIN — INSULIN GLARGINE 100 UNITS: 100 INJECTION, SOLUTION SUBCUTANEOUS at 21:58

## 2021-10-09 RX ADMIN — ONDANSETRON 4 MG: 2 INJECTION INTRAMUSCULAR; INTRAVENOUS at 10:04

## 2021-10-09 NOTE — PROGRESS NOTES
Name: Jessica Barnes ADMIT: 10/6/2021   : 1955  PCP: Kadeem Arteaga Jr., MD    MRN: [id number] LOS: 1 days   AGE/SEX: 65 y.o. female  ROOM: [facility]     Subjective   Subjective   Is frustrated about her lack of immobility and need for possible rehab post op.   Reports no BM since 10/5/21 (Tuesday). She reports she has history of bowel obstruction earlier this year and was hospitalized.  She has been on miralax ever since without issues.  Had increased pain with turning to get on bedpan this am otherwise her pain is controlled. Denies abd pain, nausea, vomiting, chest pain or shoa.  Reports a bit of heartburn after drinking coffee this am.  She uses TUMS occasionally at home for this.       Review of Systems   Constitutional: Positive for appetite change. Negative for fever.   Respiratory: Negative for cough and shortness of breath.    Cardiovascular: Positive for leg swelling. Negative for chest pain.   Gastrointestinal: Positive for constipation. Negative for abdominal distention, abdominal pain, nausea and vomiting.   Musculoskeletal: Positive for arthralgias.   Psychiatric/Behavioral: Negative for agitation and confusion.      Objective   Objective   Vital Signs  Temp:  [97.3 °F (36.3 °C)-99.3 °F (37.4 °C)] 99.1 °F (37.3 °C)  Heart Rate:  [81-97] 86  Resp:  [16-18] 16  BP: (106-130)/(43-62) 107/45  SpO2:  [90 %-94 %] 94 %  on   ;   Device (Oxygen Therapy): room air  Body mass index is 40.37 kg/m².    Physical Exam  Vitals and nursing note reviewed.   Constitutional:       Appearance: Normal appearance. She is obese.   HENT:      Head: Normocephalic and atraumatic.   Eyes:      Extraocular Movements: Extraocular movements intact.      Conjunctiva/sclera: Conjunctivae normal.   Cardiovascular:      Rate and Rhythm: Normal rate and regular rhythm.      Pulses: Normal pulses.      Heart sounds: Normal heart sounds.   Pulmonary:      Effort: Pulmonary effort is normal. No respiratory distress.      Breath  sounds: Normal breath sounds.      Comments: Dim to bases.   Abdominal:      General: Bowel sounds are normal. There is no distension.      Palpations: Abdomen is soft.      Tenderness: There is no abdominal tenderness. There is no guarding or rebound.      Comments: Obese.    Musculoskeletal:      Left lower leg: Edema present.      Comments: LLE dressed, LUE sling   Skin:     General: Skin is warm and dry.      Capillary Refill: Capillary refill takes less than 2 seconds.   Neurological:      General: No focal deficit present.      Mental Status: She is alert and oriented to person, place, and time. Mental status is at baseline.   Psychiatric:         Mood and Affect: Mood normal.         Behavior: Behavior normal.     Results Review  I reviewed the patient's new clinical results.  Results from last 7 days   Lab Units 10/09/21  0834 10/07/21  0606 10/06/21  1745   WBC 10*3/mm3 10.63 10.14 17.14*   HEMOGLOBIN g/dL 11.4* 12.5 13.8   PLATELETS 10*3/mm3 200 226 210     Results from last 7 days   Lab Units 10/09/21  0834 10/07/21  0606 10/06/21  1745   SODIUM mmol/L 129* 138 139   POTASSIUM mmol/L 3.5 3.7 3.6   CHLORIDE mmol/L 94* 101 100   CO2 mmol/L 25.0 22.5 26.7   BUN mg/dL 43* 23 22   CREATININE mg/dL 0.71 0.57 0.63   GLUCOSE mg/dL 117* 83 101*     Lab Results   Component Value Date    ANIONGAP 10.0 10/09/2021     Estimated Creatinine Clearance: 88.2 mL/min (by C-G formula based on SCr of 0.71 mg/dL).    Results from last 7 days   Lab Units 10/09/21  0834 10/06/21  1745   ALBUMIN g/dL 3.40* 4.20   BILIRUBIN mg/dL  --  0.3   ALK PHOS U/L  --  99   AST (SGOT) U/L  --  54*   ALT (SGPT) U/L  --  20     Results from last 7 days   Lab Units 10/09/21  0834 10/07/21  0606 10/06/21  1745   CALCIUM mg/dL 8.6 9.4 9.8   ALBUMIN g/dL 3.40*  --  4.20   MAGNESIUM mg/dL 2.2  --   --    PHOSPHORUS mg/dL 2.9  --   --        Hemoglobin A1C   Date/Time Value Ref Range Status   10/07/2021 0606 6.60 (H) 4.80 - 5.60 % Final     Glucose    Date/Time Value Ref Range Status   10/09/2021 0610 137 (H) 70 - 130 mg/dL Final     Comment:     Meter: [id number] : [id number] Erika Em NA   10/08/2021 2053 161 (H) 70 - 130 mg/dL Final     Comment:     Meter: [id number] : [id number] Erika Damone NA   10/08/2021 1553 114 70 - 130 mg/dL Final     Comment:     Meter: [id number] : [id number] Wolfgang Hugora CNA   10/08/2021 1019 87 70 - 130 mg/dL Final     Comment:     Meter: [id number] : [id number] Wolfgang Hugora CNA   10/08/2021 0608 108 70 - 130 mg/dL Final     Comment:     Meter: [id number] : [id number] Dante Amanda NA   10/07/2021 2107 111 70 - 130 mg/dL Final     Comment:     Meter: [id number] : [id number] Dante Amanda NA   10/07/2021 1556 121 70 - 130 mg/dL Final     Comment:     Meter: [id number] : [id number] Wolfgang Ortiz CNA       Scheduled Meds  amLODIPine, 10 mg, Oral, Q24H  atorvastatin, 10 mg, Oral, Daily  cholecalciferol, 1,000 Units, Oral, Daily  insulin glargine, 100 Units, Subcutaneous, Nightly  insulin lispro, 0-9 Units, Subcutaneous, TID With Meals  insulin lispro, 15 Units, Subcutaneous, BID  lisinopril, 20 mg, Oral, Q24H  polyethylene glycol, 17 g, Oral, Daily    Continuous Infusions   PRN Meds  •  acetaminophen **OR** acetaminophen **OR** acetaminophen  •  bisacodyl  •  calcium carbonate  •  dextrose  •  dextrose  •  glucagon (human recombinant)  •  HYDROcodone-acetaminophen  •  HYDROmorphone  •  ondansetron     Diet  Diet Regular; Consistent Carbohydrate     Estimated Creatinine Clearance: 88.2 mL/min (by C-G formula based on SCr of 0.71 mg/dL).       Assessment/Plan     Active Hospital Problems    Diagnosis  POA   • **Closed fracture of left distal fibula [S82.592A]  Unknown   • Hyponatremia [E87.1]  Unknown   • Drug-induced constipation [K59.03]  Unknown   • Diabetes mellitus (HCC) [E11.9]  Unknown   • Hypertension [I10]  Unknown   • BMI 40.0-44.9, adult (HCC) [Z68.41]  Not Applicable   •  Closed fracture of left proximal humerus [S42.202A]  Yes      Resolved Hospital Problems   No resolved problems to display.     65 y.o. female admitted with Closed fracture of left distal fibula.    · Mechanical fall without syncope/loss of consciousness  · Left distal fibula fracture  · Left proximal humerus fracture  · Analgesia  · Labs reviewed . Mild trend down 11.4 (12.5) on Hgb and trend down in Na.  Monitor.  Labs QOD to allow for reduced phlebotomy.    · Orthopedic surgery evaluation appreciated. Anticipated surgery: ankle is planned for Wednesday with Dr. Orta with  Left reverse total shoulder arthroplasty by Dr. Richard GARCIA.   · Pushed pulmonary toilet - IS at bedside.   · IDM 2  · A1c 6.60  · Controlled, continue scheduled lantus 100 nightly (home dosing) and SS correctional dosing.  No correctional dosing needed.     Hyponatremia:  trend down in Na. 129 (138 on 10/7/21).  Is on HCTZ.  50 mg this is likely contributing.  BP's have been on low side 106-11/44-50.  Will stop all together and monitor.   She already had her dose today.  Trend sodium with recheck on Monday. Renal function stable.           Hypertension: is on amlodipine, HCTZ,  and lisinopril as outpt.  BP trends low  side at times 's. As above d/c HCTZ. Renal function stable.           Constipation: Likely from immobility and narcotic use.  start back on home  miralax.  PRN dulcolax ordered.  With hx of  Small bowel obstruction will  need  to monitor closely.  Abd exam no pain, guarding, or  distention  noted.  + flatus. She declined use of senakot in addition to  miralax.  Monitor.            GERD:  She refused use of H2 blocker or PPI.  But was agreeable to  resuming home TUMS which I did tell can contribute to constipation.      · Body mass index is 40.37 kg/m².   · SCDs for DVT prophylaxis  · Full code  · Discussed with patient and nursing staff  · Anticipate discharge to SNU facility timing yet to be determined.     Eunice GUALLPA  LIAM Roberts  [facility]  10/09/21  10:45 EDT    I wore protective equipment throughout this patient encounter including a face mask, gloves, and protective eyewear.  Hand hygiene was performed before donning protective equipment and after removal when leaving the room.

## 2021-10-09 NOTE — PLAN OF CARE
Goal Outcome Evaluation:  Plan of Care Reviewed With: patient        Progress: no change  Outcome Summary: PATIENT HERE WITH CLOSED FX OF LEFT DISTAL FIBULA. VSS.  ACE WRAP AND SPLINT TO LEFT ANKLE.  POSSIBLE LEFT SHOUDER FX. SLING IN PLACE. PO / IV PAIN MEDICATION HELPING WITH PAIN.  VOIDING FINE PER BEN. EDUCATION PROVIDED ON BLOOD SUGAR MONITORING AND PAIN  CONTROL. WILL CONTINUE TO MONITOR.

## 2021-10-10 LAB
GLUCOSE BLDC GLUCOMTR-MCNC: 132 MG/DL (ref 70–130)
GLUCOSE BLDC GLUCOMTR-MCNC: 147 MG/DL (ref 70–130)
GLUCOSE BLDC GLUCOMTR-MCNC: 150 MG/DL (ref 70–130)
GLUCOSE BLDC GLUCOMTR-MCNC: 174 MG/DL (ref 70–130)

## 2021-10-10 PROCEDURE — 82962 GLUCOSE BLOOD TEST: CPT

## 2021-10-10 PROCEDURE — 63710000001 INSULIN GLARGINE PER 5 UNITS: Performed by: INTERNAL MEDICINE

## 2021-10-10 PROCEDURE — 63710000001 INSULIN LISPRO (HUMAN) PER 5 UNITS: Performed by: INTERNAL MEDICINE

## 2021-10-10 PROCEDURE — 25010000002 ONDANSETRON PER 1 MG: Performed by: INTERNAL MEDICINE

## 2021-10-10 RX ORDER — POLYETHYLENE GLYCOL 3350 17 G/17G
17 POWDER, FOR SOLUTION ORAL 2 TIMES DAILY
Status: DISCONTINUED | OUTPATIENT
Start: 2021-10-10 | End: 2021-10-17 | Stop reason: HOSPADM

## 2021-10-10 RX ADMIN — AMLODIPINE BESYLATE 10 MG: 10 TABLET ORAL at 08:44

## 2021-10-10 RX ADMIN — Medication 1000 UNITS: at 08:44

## 2021-10-10 RX ADMIN — POLYETHYLENE GLYCOL 3350 17 G: 17 POWDER, FOR SOLUTION ORAL at 08:44

## 2021-10-10 RX ADMIN — INSULIN LISPRO 2 UNITS: 100 INJECTION, SOLUTION INTRAVENOUS; SUBCUTANEOUS at 11:38

## 2021-10-10 RX ADMIN — POLYETHYLENE GLYCOL 3350 17 G: 17 POWDER, FOR SOLUTION ORAL at 17:25

## 2021-10-10 RX ADMIN — ATORVASTATIN CALCIUM 10 MG: 20 TABLET, FILM COATED ORAL at 21:14

## 2021-10-10 RX ADMIN — HYDROCODONE BITARTRATE AND ACETAMINOPHEN 1 TABLET: 7.5; 325 TABLET ORAL at 08:55

## 2021-10-10 RX ADMIN — INSULIN GLARGINE 100 UNITS: 100 INJECTION, SOLUTION SUBCUTANEOUS at 21:15

## 2021-10-10 RX ADMIN — ONDANSETRON 4 MG: 2 INJECTION INTRAMUSCULAR; INTRAVENOUS at 18:25

## 2021-10-10 RX ADMIN — INSULIN LISPRO 15 UNITS: 100 INJECTION, SOLUTION INTRAVENOUS; SUBCUTANEOUS at 11:38

## 2021-10-10 RX ADMIN — INSULIN LISPRO 15 UNITS: 100 INJECTION, SOLUTION INTRAVENOUS; SUBCUTANEOUS at 17:25

## 2021-10-10 RX ADMIN — HYDROCODONE BITARTRATE AND ACETAMINOPHEN 1 TABLET: 7.5; 325 TABLET ORAL at 17:25

## 2021-10-10 RX ADMIN — LISINOPRIL 20 MG: 20 TABLET ORAL at 08:44

## 2021-10-10 NOTE — PLAN OF CARE
Goal Outcome Evaluation:  Plan of Care Reviewed With: patient        Progress: no change  Outcome Summary: Patient stable throughout shift. VSS and voiding per purewick. Pain managed with po meds. Patient with c/o severe pain in shoulder and reluctant to move/reposition d/t pain, educated on skin injury prevention but patient refusing to turn throughout most of shift. Awaiting tenative surgery. BG stable. Bowel regimen added for constipation.

## 2021-10-10 NOTE — PLAN OF CARE
Goal Outcome Evaluation:  Plan of Care Reviewed With: patient        Progress: no change  Outcome Summary: PATIENT HERE WITH CLOSED FX OF LEFT DISTAL FIBULA AND LEFT SHOULDER PAIN. SPLINT /ACEWRAP TO LEFT ANKLE. SLING TO LEFT SHOULDER. VOIDING FINE PRE PUREWICK. PO PAIN MEDICATION HELPING WITH PAIN. EDUCATION PROVIDED ON THE NEED TO REPOSITION AND BLOOD SUGAR  MONITORING. WILL CONTINUE TO MONITOR.

## 2021-10-10 NOTE — PLAN OF CARE
Goal Outcome Evaluation:  Plan of Care Reviewed With: patient        Progress: no change  Outcome Summary: Patient stable throughout shift. VSS and voiding per purewick. Pain managed with po meds. Miralax dose increased to BID, bowel sounds positive. Patient not able to tolerate laying in any position except for supine, educated on importance of turns/weight shifting related to prevent skin breakdown, but patient continues to refuse. Tenative plan for surgical repair of ankle and shoulder this week

## 2021-10-10 NOTE — PROGRESS NOTES
Name: Jessica Barnes ADMIT: 10/6/2021   : 1955  PCP: Kadeem Arteaga Jr., MD    MRN: [id number] LOS: 2 days   AGE/SEX: 65 y.o. female  ROOM: [facility]     Subjective   Subjective   Feels about the same as yesterday.  Reports no BM since 10/5/21 (Tuesday). She reports she has history of bowel obstruction earlier this year and was hospitalized.  She has been on miralax ever since without issues.  Pain better controlled today.  Still resistant for staff to reposition her. Denies abd pain, nausea, vomiting, chest pain or shoa.  She is still anxiously awaiting word on status of shoulder surgery.  Pulling 1500 to 2000 on IS.         Review of Systems   Constitutional: Positive for appetite change. Negative for fever.   Respiratory: Negative for cough and shortness of breath.    Cardiovascular: Positive for leg swelling. Negative for chest pain.   Gastrointestinal: Positive for constipation. Negative for abdominal distention, abdominal pain, nausea and vomiting.   Musculoskeletal: Positive for arthralgias.   Psychiatric/Behavioral: Negative for agitation and confusion.      Objective   Objective   Vital Signs  Temp:  [97.3 °F (36.3 °C)-99.1 °F (37.3 °C)] 98.1 °F (36.7 °C)  Heart Rate:  [86-96] 92  Resp:  [16] 16  BP: (107-121)/(45-63) 118/53  SpO2:  [91 %-94 %] 92 %  on   ;   Device (Oxygen Therapy): room air  Body mass index is 40.37 kg/m².    Physical Exam  Vitals and nursing note reviewed.   Constitutional:       Appearance: Normal appearance. She is obese.   HENT:      Head: Normocephalic and atraumatic.   Eyes:      Extraocular Movements: Extraocular movements intact.      Conjunctiva/sclera: Conjunctivae normal.   Cardiovascular:      Rate and Rhythm: Normal rate and regular rhythm.      Pulses: Normal pulses.      Heart sounds: Normal heart sounds.   Pulmonary:      Effort: Pulmonary effort is normal. No respiratory distress.      Breath sounds: Normal breath sounds.      Comments: Dim to bases.    Abdominal:      General: Bowel sounds are normal. There is no distension.      Palpations: Abdomen is soft.      Tenderness: There is no abdominal tenderness. There is no guarding.      Comments: Obese.    Musculoskeletal:      Left lower leg: Edema present.      Comments: LLE dressed from below knee to foot soft cast.  LUE sling.  Swelling noted around left knee.  Left thigh soft.     Skin:     General: Skin is warm and dry.      Capillary Refill: Capillary refill takes less than 2 seconds.   Neurological:      General: No focal deficit present.      Mental Status: She is alert and oriented to person, place, and time. Mental status is at baseline.   Psychiatric:         Mood and Affect: Mood normal.         Behavior: Behavior normal.     Results Review  I reviewed the patient's new clinical results.  Results from last 7 days   Lab Units 10/09/21  0834 10/07/21  0606 10/06/21  1745   WBC 10*3/mm3 10.63 10.14 17.14*   HEMOGLOBIN g/dL 11.4* 12.5 13.8   PLATELETS 10*3/mm3 200 226 210     Results from last 7 days   Lab Units 10/09/21  0834 10/07/21  0606 10/06/21  1745   SODIUM mmol/L 129* 138 139   POTASSIUM mmol/L 3.5 3.7 3.6   CHLORIDE mmol/L 94* 101 100   CO2 mmol/L 25.0 22.5 26.7   BUN mg/dL 43* 23 22   CREATININE mg/dL 0.71 0.57 0.63   GLUCOSE mg/dL 117* 83 101*     Lab Results   Component Value Date    ANIONGAP 10.0 10/09/2021     Estimated Creatinine Clearance: 88.2 mL/min (by C-G formula based on SCr of 0.71 mg/dL).    Results from last 7 days   Lab Units 10/09/21  0834 10/06/21  1745   ALBUMIN g/dL 3.40* 4.20   BILIRUBIN mg/dL  --  0.3   ALK PHOS U/L  --  99   AST (SGOT) U/L  --  54*   ALT (SGPT) U/L  --  20     Results from last 7 days   Lab Units 10/09/21  0834 10/07/21  0606 10/06/21  1745   CALCIUM mg/dL 8.6 9.4 9.8   ALBUMIN g/dL 3.40*  --  4.20   MAGNESIUM mg/dL 2.2  --   --    PHOSPHORUS mg/dL 2.9  --   --        Glucose   Date/Time Value Ref Range Status   10/10/2021 0608 147 (H) 70 - 130 mg/dL Final      Comment:     Meter: [id number] : [id number] Kane Mary NA   10/09/2021 2058 103 70 - 130 mg/dL Final     Comment:     Meter: [id number] : [id number] Kane Mary NA   10/09/2021 1543 142 (H) 70 - 130 mg/dL Final     Comment:     Meter: [id number] : [id number] Wolfgang Ortiz CNA   10/09/2021 1028 115 70 - 130 mg/dL Final     Comment:     Meter: [id number] : [id number] Wolfgang Ortiz CNA   10/09/2021 0610 137 (H) 70 - 130 mg/dL Final     Comment:     Meter: [id number] : [id number] Erika Maria Antonia NA   10/08/2021 2053 161 (H) 70 - 130 mg/dL Final     Comment:     Meter: [id number] : [id number] Erika Maria Antonia NA   10/08/2021 1553 114 70 - 130 mg/dL Final     Comment:     Meter: [id number] : [id number] Wolfgang Ortiz CNA       Scheduled Meds  amLODIPine, 10 mg, Oral, Q24H  atorvastatin, 10 mg, Oral, Daily  cholecalciferol, 1,000 Units, Oral, Daily  insulin glargine, 100 Units, Subcutaneous, Nightly  insulin lispro, 0-9 Units, Subcutaneous, TID With Meals  insulin lispro, 15 Units, Subcutaneous, BID  lisinopril, 20 mg, Oral, Q24H  polyethylene glycol, 17 g, Oral, Daily    Continuous Infusions   PRN Meds  •  acetaminophen **OR** acetaminophen **OR** acetaminophen  •  bisacodyl  •  calcium carbonate  •  dextrose  •  dextrose  •  glucagon (human recombinant)  •  HYDROcodone-acetaminophen  •  HYDROmorphone  •  ondansetron     Diet  Diet Regular; Consistent Carbohydrate     Estimated Creatinine Clearance: 88.2 mL/min (by C-G formula based on SCr of 0.71 mg/dL).       Assessment/Plan     Active Hospital Problems    Diagnosis  POA   • **Closed fracture of left distal fibula [S82.142A]  Unknown   • Hyponatremia [E87.1]  Unknown   • Drug-induced constipation [K59.03]  Unknown   • Diabetes mellitus (HCC) [E11.9]  Unknown   • Hypertension [I10]  Unknown   • BMI 40.0-44.9, adult (HCC) [Z68.41]  Not Applicable   • Closed fracture of left proximal humerus [S42.202A]  Yes      Resolved Hospital Problems    No resolved problems to display.     65 y.o. female admitted with Closed fracture of left distal fibula.    · Mechanical fall without syncope/loss of consciousness  · Left distal fibula fracture  · Left proximal humerus fracture  · Analgesia  · Labs reviewed . Mild trend down 11.4 (12.5) on Hgb and trend down in Na.  Monitor.  Labs QOD to allow for reduced phlebotomy.    · Orthopedic surgery evaluation appreciated. Anticipated surgery: ankle is planned for Wednesday with Dr. Orta with  Left reverse total shoulder arthroplasty by Dr. Richard GARCIA.   · Pushed pulmonary toilet - IS at bedside.   · IDM 2  · A1c 6.60  · Controlled, continue scheduled lantus 100 nightly (home dosing) and SS correctional dosing.  No correctional dosing needed.     Hyponatremia:  trend down in Na. 129 (138 on 10/7/21).  Is on HCTZ.  50 mg this is likely contributing.  BP's have been on low side 107 - 120 systolic.  HCTZ  Has been dc'd.  Trend sodium with recheck on Monday. Renal function stable 10/9          Hypertension: is on amlodipine, HCTZ,  and lisinopril as outpt.  BP trends low  side at times.   As above d/c HCTZ.           Constipation: Likely from immobility and narcotic use.  started back on home  miralax 10/9  PRN dulcolax ordered.  With hx of Small bowel  obstruction will need  to monitor closely.  Abd exam no pain, guarding, or  distention noted.  + flatus. She declined use of senakot in addition to  miralax. Monitor. Will increase miralax to BID.            GERD:  She refused use of H2 blocker or PPI.  But was agreeable to  resuming home TUMS which I did tell can contribute to constipation.      · Body mass index is 40.37 kg/m².   · SCDs for DVT prophylaxis  · Full code  · Discussed with patient and nursing staff  · Anticipate discharge to SNU facility timing yet to be determined.     LIAM Fernandez  [facility]  10/10/21  09:26 EDT    I wore protective equipment throughout this patient  encounter including a face mask, gloves, and protective eyewear.  Hand hygiene was performed before donning protective equipment and after removal when leaving the room.

## 2021-10-11 LAB
ANION GAP SERPL CALCULATED.3IONS-SCNC: 11.5 MMOL/L (ref 5–15)
BUN SERPL-MCNC: 47 MG/DL (ref 8–23)
BUN/CREAT SERPL: 71.2 (ref 7–25)
CALCIUM SPEC-SCNC: 8.7 MG/DL (ref 8.6–10.5)
CHLORIDE SERPL-SCNC: 97 MMOL/L (ref 98–107)
CO2 SERPL-SCNC: 25.5 MMOL/L (ref 22–29)
CREAT SERPL-MCNC: 0.66 MG/DL (ref 0.57–1)
DEPRECATED RDW RBC AUTO: 45.2 FL (ref 37–54)
ERYTHROCYTE [DISTWIDTH] IN BLOOD BY AUTOMATED COUNT: 14.1 % (ref 12.3–15.4)
GFR SERPL CREATININE-BSD FRML MDRD: 90 ML/MIN/1.73
GLUCOSE BLDC GLUCOMTR-MCNC: 139 MG/DL (ref 70–130)
GLUCOSE BLDC GLUCOMTR-MCNC: 155 MG/DL (ref 70–130)
GLUCOSE BLDC GLUCOMTR-MCNC: 165 MG/DL (ref 70–130)
GLUCOSE BLDC GLUCOMTR-MCNC: 231 MG/DL (ref 70–130)
GLUCOSE SERPL-MCNC: 143 MG/DL (ref 65–99)
HCT VFR BLD AUTO: 36.2 % (ref 34–46.6)
HGB BLD-MCNC: 11.3 G/DL (ref 12–15.9)
MCH RBC QN AUTO: 27.2 PG (ref 26.6–33)
MCHC RBC AUTO-ENTMCNC: 31.2 G/DL (ref 31.5–35.7)
MCV RBC AUTO: 87 FL (ref 79–97)
PLATELET # BLD AUTO: 264 10*3/MM3 (ref 140–450)
PMV BLD AUTO: 9.6 FL (ref 6–12)
POTASSIUM SERPL-SCNC: 4 MMOL/L (ref 3.5–5.2)
RBC # BLD AUTO: 4.16 10*6/MM3 (ref 3.77–5.28)
SODIUM SERPL-SCNC: 134 MMOL/L (ref 136–145)
WBC # BLD AUTO: 9.49 10*3/MM3 (ref 3.4–10.8)

## 2021-10-11 PROCEDURE — 85027 COMPLETE CBC AUTOMATED: CPT | Performed by: NURSE PRACTITIONER

## 2021-10-11 PROCEDURE — 80048 BASIC METABOLIC PNL TOTAL CA: CPT | Performed by: NURSE PRACTITIONER

## 2021-10-11 PROCEDURE — 63710000001 INSULIN GLARGINE PER 5 UNITS: Performed by: INTERNAL MEDICINE

## 2021-10-11 PROCEDURE — 63710000001 INSULIN LISPRO (HUMAN) PER 5 UNITS: Performed by: INTERNAL MEDICINE

## 2021-10-11 PROCEDURE — 25010000002 ONDANSETRON PER 1 MG: Performed by: INTERNAL MEDICINE

## 2021-10-11 PROCEDURE — 82962 GLUCOSE BLOOD TEST: CPT

## 2021-10-11 RX ORDER — INSULIN LISPRO 100 [IU]/ML
15 INJECTION, SOLUTION INTRAVENOUS; SUBCUTANEOUS
Status: DISCONTINUED | OUTPATIENT
Start: 2021-10-12 | End: 2021-10-17 | Stop reason: HOSPADM

## 2021-10-11 RX ADMIN — ANTACID TABLETS 1 TABLET: 500 TABLET, CHEWABLE ORAL at 21:37

## 2021-10-11 RX ADMIN — ONDANSETRON 4 MG: 2 INJECTION INTRAMUSCULAR; INTRAVENOUS at 21:37

## 2021-10-11 RX ADMIN — POLYETHYLENE GLYCOL 3350 17 G: 17 POWDER, FOR SOLUTION ORAL at 20:10

## 2021-10-11 RX ADMIN — HYDROCODONE BITARTRATE AND ACETAMINOPHEN 1 TABLET: 7.5; 325 TABLET ORAL at 00:06

## 2021-10-11 RX ADMIN — INSULIN LISPRO 4 UNITS: 100 INJECTION, SOLUTION INTRAVENOUS; SUBCUTANEOUS at 17:40

## 2021-10-11 RX ADMIN — HYDROCODONE BITARTRATE AND ACETAMINOPHEN 1 TABLET: 7.5; 325 TABLET ORAL at 20:09

## 2021-10-11 RX ADMIN — HYDROCODONE BITARTRATE AND ACETAMINOPHEN 1 TABLET: 7.5; 325 TABLET ORAL at 14:10

## 2021-10-11 RX ADMIN — INSULIN LISPRO 2 UNITS: 100 INJECTION, SOLUTION INTRAVENOUS; SUBCUTANEOUS at 12:06

## 2021-10-11 RX ADMIN — INSULIN GLARGINE 100 UNITS: 100 INJECTION, SOLUTION SUBCUTANEOUS at 21:29

## 2021-10-11 RX ADMIN — POLYETHYLENE GLYCOL 3350 17 G: 17 POWDER, FOR SOLUTION ORAL at 08:03

## 2021-10-11 RX ADMIN — Medication 1000 UNITS: at 08:03

## 2021-10-11 RX ADMIN — ATORVASTATIN CALCIUM 10 MG: 20 TABLET, FILM COATED ORAL at 20:09

## 2021-10-11 RX ADMIN — HYDROCODONE BITARTRATE AND ACETAMINOPHEN 1 TABLET: 7.5; 325 TABLET ORAL at 08:03

## 2021-10-11 RX ADMIN — AMLODIPINE BESYLATE 10 MG: 10 TABLET ORAL at 08:03

## 2021-10-11 RX ADMIN — LISINOPRIL 20 MG: 20 TABLET ORAL at 08:03

## 2021-10-11 NOTE — CASE MANAGEMENT/SOCIAL WORK
Continued Stay Note  [facility]     Patient Name: Jessica Barnes  MRN: [id number]  Today's Date: 10/11/2021    Admit Date: 10/6/2021     Discharge Plan     Row Name 10/11/21 1144       Plan    Plan Follow up for SNF choices; surgery 10/13    Plan Comments CCP met with patient. Patient still plans for SNF and states her family is reviewing SNF options. CCP encouraged patient to provide CCP with SNF choices in order to place referrals; patient verbalized understanding. Per notes; plan is for surgery 10/13. CCP will follow and asisst as needed. Lulu Tomlinson CSW               Discharge Codes    No documentation.                     JANI Dhillon

## 2021-10-11 NOTE — PLAN OF CARE
Goal Outcome Evaluation:  Plan of Care Reviewed With: patient        Progress: no change  Outcome Summary: patient resting comfortably between care, voiding per purewick, pain controlled at this time, surgery planned for 10/13/21, educated on b/p and glucose monitoring

## 2021-10-11 NOTE — PLAN OF CARE
Goal Outcome Evaluation:            Fall at home, L distal fib fracture and l humerus fracture, NWB LLE, NWB LUE, armsling in place, acewrap and splint to LLE, purewick in place, norco for pain q 6 hrs, plan is for ORIF of L ankle on Wednesday, Richard to operate on L shoulder, but no surgery planned at this time, pt educated on the importance of BG monitoring related to hx of diabetes

## 2021-10-12 LAB
GLUCOSE BLDC GLUCOMTR-MCNC: 112 MG/DL (ref 70–130)
GLUCOSE BLDC GLUCOMTR-MCNC: 121 MG/DL (ref 70–130)
GLUCOSE BLDC GLUCOMTR-MCNC: 138 MG/DL (ref 70–130)
GLUCOSE BLDC GLUCOMTR-MCNC: 185 MG/DL (ref 70–130)
SARS-COV-2 RNA PNL SPEC NAA+PROBE: NOT DETECTED

## 2021-10-12 PROCEDURE — 25010000002 ONDANSETRON PER 1 MG: Performed by: INTERNAL MEDICINE

## 2021-10-12 PROCEDURE — 63710000001 INSULIN LISPRO (HUMAN) PER 5 UNITS: Performed by: INTERNAL MEDICINE

## 2021-10-12 PROCEDURE — 63710000001 INSULIN GLARGINE PER 5 UNITS: Performed by: INTERNAL MEDICINE

## 2021-10-12 PROCEDURE — 87635 SARS-COV-2 COVID-19 AMP PRB: CPT | Performed by: ORTHOPAEDIC SURGERY

## 2021-10-12 PROCEDURE — 82962 GLUCOSE BLOOD TEST: CPT

## 2021-10-12 RX ADMIN — LISINOPRIL 20 MG: 20 TABLET ORAL at 08:51

## 2021-10-12 RX ADMIN — HYDROCODONE BITARTRATE AND ACETAMINOPHEN 1 TABLET: 7.5; 325 TABLET ORAL at 21:49

## 2021-10-12 RX ADMIN — ONDANSETRON 4 MG: 2 INJECTION INTRAMUSCULAR; INTRAVENOUS at 21:48

## 2021-10-12 RX ADMIN — HYDROCODONE BITARTRATE AND ACETAMINOPHEN 1 TABLET: 7.5; 325 TABLET ORAL at 08:51

## 2021-10-12 RX ADMIN — ATORVASTATIN CALCIUM 10 MG: 20 TABLET, FILM COATED ORAL at 21:48

## 2021-10-12 RX ADMIN — HYDROCODONE BITARTRATE AND ACETAMINOPHEN 1 TABLET: 7.5; 325 TABLET ORAL at 02:04

## 2021-10-12 RX ADMIN — Medication 1000 UNITS: at 08:51

## 2021-10-12 RX ADMIN — AMLODIPINE BESYLATE 10 MG: 10 TABLET ORAL at 08:51

## 2021-10-12 RX ADMIN — INSULIN LISPRO 2 UNITS: 100 INJECTION, SOLUTION INTRAVENOUS; SUBCUTANEOUS at 14:15

## 2021-10-12 RX ADMIN — INSULIN GLARGINE 100 UNITS: 100 INJECTION, SOLUTION SUBCUTANEOUS at 22:59

## 2021-10-12 RX ADMIN — POLYETHYLENE GLYCOL 3350 17 G: 17 POWDER, FOR SOLUTION ORAL at 08:51

## 2021-10-12 RX ADMIN — INSULIN LISPRO 15 UNITS: 100 INJECTION, SOLUTION INTRAVENOUS; SUBCUTANEOUS at 14:17

## 2021-10-12 RX ADMIN — HYDROCODONE BITARTRATE AND ACETAMINOPHEN 1 TABLET: 7.5; 325 TABLET ORAL at 14:16

## 2021-10-12 NOTE — PROGRESS NOTES
Orthopedic Progress Note      Patient: Jessica Barnes    Date of Admission: 10/6/2021  2:50 PM    YOB: 1955    Medical Record Number: [id number]    Attending Physician: Gibran Diallo DO    History of Present Illness  Jessica Barnes is a 65 y.o. female with history of diabetes, hyperlipidemia, hypertension who presented with left shoulder and left ankle pain following a   Mechanical fall.  She apparently slipped on some wet steps.  She had immediate left ankle pain and left shoulder pain.  She was brought to the emergency department on 10/6/2021where radiographs showed a mildly displaced left proximal humerus fracture as well as a left distal fibular fracture.  Given her multiple injuries she was admitted to the hospitalist service.  Orthopedics was consulted for evaluation and management.     She localizes pain to the left ankle and left shoulder.  Pain can be a 5 out of 10.  It is dull and aching.  It is worse with activity and better with rest.    Current Medications:  Scheduled Meds:amLODIPine, 10 mg, Oral, Q24H  atorvastatin, 10 mg, Oral, Daily  cholecalciferol, 1,000 Units, Oral, Daily  insulin glargine, 100 Units, Subcutaneous, Nightly  insulin lispro, 0-9 Units, Subcutaneous, TID With Meals  insulin lispro, 15 Units, Subcutaneous, Daily With Lunch & Dinner  lisinopril, 20 mg, Oral, Q24H  polyethylene glycol, 17 g, Oral, BID      Continuous Infusions:   PRN Meds:.•  acetaminophen **OR** acetaminophen **OR** acetaminophen  •  bisacodyl  •  calcium carbonate  •  dextrose  •  dextrose  •  glucagon (human recombinant)  •  HYDROcodone-acetaminophen  •  HYDROmorphone  •  ondansetron      Physical Exam: 65 y.o. female  General Appearance:    Alert, cooperative, in no acute distress                   Vitals:    10/11/21 1839 10/11/21 2201 10/12/21 0203 10/12/21 0759   BP: 101/53 132/55 135/56 124/55   BP Location: Right arm Right arm Right arm Right arm   Patient Position: Lying Lying Lying Lying    Pulse: 73 83 84 80   Resp: 18 16 16 17   Temp: 98 °F (36.7 °C) 97 °F (36.1 °C) 97 °F (36.1 °C) 98.1 °F (36.7 °C)   TempSrc: Oral Skin Skin Oral   SpO2: 94% 94% 94% 94%   Weight:       Height:            Extremities:   Dressing Dry and Intact    Incision intact without signs or symptoms of infections   Pulses:   Pulses palpable and equal bilaterally   Skin:   No bleeding, bruising or rash       Diagnostic Tests:    Admission on 10/06/2021   Component Date Value Ref Range Status   • Glucose 10/06/2021 101  70 - 130 mg/dL Final    Meter: [id number] : [id number] Lashawn Das RN   • Glucose 10/06/2021 101* 65 - 99 mg/dL Final   • BUN 10/06/2021 22  8 - 23 mg/dL Final   • Creatinine 10/06/2021 0.63  0.57 - 1.00 mg/dL Final   • Sodium 10/06/2021 139  136 - 145 mmol/L Final   • Potassium 10/06/2021 3.6  3.5 - 5.2 mmol/L Final   • Chloride 10/06/2021 100  98 - 107 mmol/L Final   • CO2 10/06/2021 26.7  22.0 - 29.0 mmol/L Final   • Calcium 10/06/2021 9.8  8.6 - 10.5 mg/dL Final   • Total Protein 10/06/2021 6.8  6.0 - 8.5 g/dL Final   • Albumin 10/06/2021 4.20  3.50 - 5.20 g/dL Final   • ALT (SGPT) 10/06/2021 20  1 - 33 U/L Final   • AST (SGOT) 10/06/2021 54* 1 - 32 U/L Final   • Alkaline Phosphatase 10/06/2021 99  39 - 117 U/L Final   • Total Bilirubin 10/06/2021 0.3  0.0 - 1.2 mg/dL Final   • eGFR Non African Amer 10/06/2021 95  >60 mL/min/1.73 Final   • Globulin 10/06/2021 2.6  gm/dL Final   • A/G Ratio 10/06/2021 1.6  g/dL Final   • BUN/Creatinine Ratio 10/06/2021 34.9* 7.0 - 25.0 Final   • Anion Gap 10/06/2021 12.3  5.0 - 15.0 mmol/L Final   • COVID19 10/06/2021 Not Detected  Not Detected - Ref. Range Final   • WBC 10/06/2021 17.14* 3.40 - 10.80 10*3/mm3 Final   • RBC 10/06/2021 4.98  3.77 - 5.28 10*6/mm3 Final   • Hemoglobin 10/06/2021 13.8  12.0 - 15.9 g/dL Final   • Hematocrit 10/06/2021 42.5  34.0 - 46.6 % Final   • MCV 10/06/2021 85.3  79.0 - 97.0 fL Final   • MCH 10/06/2021 27.7  26.6 - 33.0 pg Final   • MCHC  10/06/2021 32.5  31.5 - 35.7 g/dL Final   • RDW 10/06/2021 14.0  12.3 - 15.4 % Final   • RDW-SD 10/06/2021 43.6  37.0 - 54.0 fl Final   • MPV 10/06/2021 10.3  6.0 - 12.0 fL Final   • Platelets 10/06/2021 210  140 - 450 10*3/mm3 Final   • Neutrophil % 10/06/2021 87.3* 42.7 - 76.0 % Final   • Lymphocyte % 10/06/2021 7.4* 19.6 - 45.3 % Final   • Monocyte % 10/06/2021 4.1* 5.0 - 12.0 % Final   • Eosinophil % 10/06/2021 0.2* 0.3 - 6.2 % Final   • Basophil % 10/06/2021 0.2  0.0 - 1.5 % Final   • Immature Grans % 10/06/2021 0.8* 0.0 - 0.5 % Final   • Neutrophils, Absolute 10/06/2021 14.95* 1.70 - 7.00 10*3/mm3 Final   • Lymphocytes, Absolute 10/06/2021 1.27  0.70 - 3.10 10*3/mm3 Final   • Monocytes, Absolute 10/06/2021 0.71  0.10 - 0.90 10*3/mm3 Final   • Eosinophils, Absolute 10/06/2021 0.03  0.00 - 0.40 10*3/mm3 Final   • Basophils, Absolute 10/06/2021 0.04  0.00 - 0.20 10*3/mm3 Final   • Immature Grans, Absolute 10/06/2021 0.14* 0.00 - 0.05 10*3/mm3 Final   • nRBC 10/06/2021 0.0  0.0 - 0.2 /100 WBC Final   • Glucose 10/07/2021 83  65 - 99 mg/dL Final   • BUN 10/07/2021 23  8 - 23 mg/dL Final   • Creatinine 10/07/2021 0.57  0.57 - 1.00 mg/dL Final   • Sodium 10/07/2021 138  136 - 145 mmol/L Final   • Potassium 10/07/2021 3.7  3.5 - 5.2 mmol/L Final   • Chloride 10/07/2021 101  98 - 107 mmol/L Final   • CO2 10/07/2021 22.5  22.0 - 29.0 mmol/L Final   • Calcium 10/07/2021 9.4  8.6 - 10.5 mg/dL Final   • eGFR Non  Amer 10/07/2021 106  >60 mL/min/1.73 Final   • BUN/Creatinine Ratio 10/07/2021 40.4* 7.0 - 25.0 Final   • Anion Gap 10/07/2021 14.5  5.0 - 15.0 mmol/L Final   • WBC 10/07/2021 10.14  3.40 - 10.80 10*3/mm3 Final   • RBC 10/07/2021 4.55  3.77 - 5.28 10*6/mm3 Final   • Hemoglobin 10/07/2021 12.5  12.0 - 15.9 g/dL Final   • Hematocrit 10/07/2021 39.3  34.0 - 46.6 % Final   • MCV 10/07/2021 86.4  79.0 - 97.0 fL Final   • MCH 10/07/2021 27.5  26.6 - 33.0 pg Final   • MCHC 10/07/2021 31.8  31.5 - 35.7 g/dL Final    • RDW 10/07/2021 14.2  12.3 - 15.4 % Final   • RDW-SD 10/07/2021 44.7  37.0 - 54.0 fl Final   • MPV 10/07/2021 10.2  6.0 - 12.0 fL Final   • Platelets 10/07/2021 226  140 - 450 10*3/mm3 Final   • Neutrophil % 10/07/2021 72.2  42.7 - 76.0 % Final   • Lymphocyte % 10/07/2021 17.7* 19.6 - 45.3 % Final   • Monocyte % 10/07/2021 7.9  5.0 - 12.0 % Final   • Eosinophil % 10/07/2021 1.1  0.3 - 6.2 % Final   • Basophil % 10/07/2021 0.4  0.0 - 1.5 % Final   • Immature Grans % 10/07/2021 0.7* 0.0 - 0.5 % Final   • Neutrophils, Absolute 10/07/2021 7.33* 1.70 - 7.00 10*3/mm3 Final   • Lymphocytes, Absolute 10/07/2021 1.79  0.70 - 3.10 10*3/mm3 Final   • Monocytes, Absolute 10/07/2021 0.80  0.10 - 0.90 10*3/mm3 Final   • Eosinophils, Absolute 10/07/2021 0.11  0.00 - 0.40 10*3/mm3 Final   • Basophils, Absolute 10/07/2021 0.04  0.00 - 0.20 10*3/mm3 Final   • Immature Grans, Absolute 10/07/2021 0.07* 0.00 - 0.05 10*3/mm3 Final   • nRBC 10/07/2021 0.1  0.0 - 0.2 /100 WBC Final   • Hemoglobin A1C 10/07/2021 6.60* 4.80 - 5.60 % Final   • Glucose 10/06/2021 108  70 - 130 mg/dL Final    Meter: [id number] : [id number] Kal WALSH RN   • Glucose 10/07/2021 95  70 - 130 mg/dL Final    Meter: [id number] : [id number] Erika SON   • Glucose 10/07/2021 94  70 - 130 mg/dL Final    Meter: [id number] : [id number] Wolfgang Ortiz CNA   • Glucose 10/07/2021 121  70 - 130 mg/dL Final    Meter: [id number] : [id number] Wolfgang Ortiz CNA   • Glucose 10/07/2021 111  70 - 130 mg/dL Final    Meter: [id number] : [id number] Dante Amanda NA   • Glucose 10/08/2021 108  70 - 130 mg/dL Final    Meter: [id number] : [id number] Danet Amanda NA   • Glucose 10/08/2021 87  70 - 130 mg/dL Final    Meter: [id number] : [id number] Wolfgang Ortiz CNA   • Glucose 10/08/2021 114  70 - 130 mg/dL Final    Meter: [id number] : [id number] Wolfgang Ortiz CNA   • Glucose 10/08/2021 161* 70 - 130 mg/dL Final    Meter:  [id number] : [id number] Erika SON   • Glucose 10/09/2021 117* 65 - 99 mg/dL Final   • BUN 10/09/2021 43* 8 - 23 mg/dL Final   • Creatinine 10/09/2021 0.71  0.57 - 1.00 mg/dL Final   • Sodium 10/09/2021 129* 136 - 145 mmol/L Final   • Potassium 10/09/2021 3.5  3.5 - 5.2 mmol/L Final   • Chloride 10/09/2021 94* 98 - 107 mmol/L Final   • CO2 10/09/2021 25.0  22.0 - 29.0 mmol/L Final   • Calcium 10/09/2021 8.6  8.6 - 10.5 mg/dL Final   • Albumin 10/09/2021 3.40* 3.50 - 5.20 g/dL Final   • Phosphorus 10/09/2021 2.9  2.5 - 4.5 mg/dL Final   • Anion Gap 10/09/2021 10.0  5.0 - 15.0 mmol/L Final   • BUN/Creatinine Ratio 10/09/2021 60.6* 7.0 - 25.0 Final   • eGFR Non  Amer 10/09/2021 82  >60 mL/min/1.73 Final   • Magnesium 10/09/2021 2.2  1.6 - 2.4 mg/dL Final   • WBC 10/09/2021 10.63  3.40 - 10.80 10*3/mm3 Final   • RBC 10/09/2021 4.11  3.77 - 5.28 10*6/mm3 Final   • Hemoglobin 10/09/2021 11.4* 12.0 - 15.9 g/dL Final   • Hematocrit 10/09/2021 34.2  34.0 - 46.6 % Final   • MCV 10/09/2021 83.2  79.0 - 97.0 fL Final   • MCH 10/09/2021 27.7  26.6 - 33.0 pg Final   • MCHC 10/09/2021 33.3  31.5 - 35.7 g/dL Final   • RDW 10/09/2021 13.8  12.3 - 15.4 % Final   • RDW-SD 10/09/2021 41.6  37.0 - 54.0 fl Final   • MPV 10/09/2021 9.8  6.0 - 12.0 fL Final   • Platelets 10/09/2021 200  140 - 450 10*3/mm3 Final   • Glucose 10/09/2021 137* 70 - 130 mg/dL Final    Meter: [id number] : [id number] Erika SON   • Glucose 10/09/2021 115  70 - 130 mg/dL Final    Meter: [id number] : [id number] Wolfgang ALICEAA   • Glucose 10/09/2021 142* 70 - 130 mg/dL Final    Meter: [id number] : [id number] Wolfgang ALICEAA   • Glucose 10/09/2021 103  70 - 130 mg/dL Final    Meter: [id number] : [id number] Kane Hernandez NA   • Glucose 10/10/2021 147* 70 - 130 mg/dL Final    Meter: [id number] : [id number] Kane Hernandez NA   • Glucose 10/10/2021 174* 70 - 130 mg/dL Final    Meter: [id number] : [id number] Laura SON    • Glucose 10/10/2021 132* 70 - 130 mg/dL Final    Meter: [id number] : [id number] Laura Mendoza HUY   • Glucose 10/11/2021 143* 65 - 99 mg/dL Final   • BUN 10/11/2021 47* 8 - 23 mg/dL Final   • Creatinine 10/11/2021 0.66  0.57 - 1.00 mg/dL Final   • Sodium 10/11/2021 134* 136 - 145 mmol/L Final   • Potassium 10/11/2021 4.0  3.5 - 5.2 mmol/L Final   • Chloride 10/11/2021 97* 98 - 107 mmol/L Final   • CO2 10/11/2021 25.5  22.0 - 29.0 mmol/L Final   • Calcium 10/11/2021 8.7  8.6 - 10.5 mg/dL Final   • eGFR Non African Amer 10/11/2021 90  >60 mL/min/1.73 Final   • BUN/Creatinine Ratio 10/11/2021 71.2* 7.0 - 25.0 Final   • Anion Gap 10/11/2021 11.5  5.0 - 15.0 mmol/L Final   • Glucose 10/10/2021 150* 70 - 130 mg/dL Final    Meter: [id number] : [id number] Talat SON   • WBC 10/11/2021 9.49  3.40 - 10.80 10*3/mm3 Final   • RBC 10/11/2021 4.16  3.77 - 5.28 10*6/mm3 Final   • Hemoglobin 10/11/2021 11.3* 12.0 - 15.9 g/dL Final   • Hematocrit 10/11/2021 36.2  34.0 - 46.6 % Final   • MCV 10/11/2021 87.0  79.0 - 97.0 fL Final   • MCH 10/11/2021 27.2  26.6 - 33.0 pg Final   • MCHC 10/11/2021 31.2* 31.5 - 35.7 g/dL Final   • RDW 10/11/2021 14.1  12.3 - 15.4 % Final   • RDW-SD 10/11/2021 45.2  37.0 - 54.0 fl Final   • MPV 10/11/2021 9.6  6.0 - 12.0 fL Final   • Platelets 10/11/2021 264  140 - 450 10*3/mm3 Final   • Glucose 10/11/2021 139* 70 - 130 mg/dL Final    Meter: [id number] : [id number] Talat Saida NA   • Glucose 10/11/2021 155* 70 - 130 mg/dL Final    Meter: [id number] : [id number] Chaitanya Aguilar NA   • Glucose 10/11/2021 231* 70 - 130 mg/dL Final    Meter: [id number] : [id number] Chaitanya Aguilar NA   • Glucose 10/11/2021 165* 70 - 130 mg/dL Final    Meter: [id number] : [id number] Adeline Terrell RN   • Glucose 10/12/2021 112  70 - 130 mg/dL Final    Meter: [id number] : [id number] Adeline Terrell RN       No results found.    Assessment:  Patient Active Problem List    Diagnosis   • Small bowel obstruction (HCC)   • Closed fracture of left proximal humerus   • Closed fracture of left distal fibula   • Diabetes mellitus (HCC)   • Hypertension   • BMI 40.0-44.9, adult (HCC)   • Hyponatremia   • Drug-induced constipation           Plan:    ORIF left ankle planned for 10/13 AM and Left reverse total shoulder arthroplasty planned for 10/13/21 @ 1230pm.    We have discussed risk benefits and alternatives as well as postoperative recovery.  She does understand.      Date: 10/12/2021    Patrick Ramos MD

## 2021-10-12 NOTE — PLAN OF CARE
Goal Outcome Evaluation:  Plan of Care Reviewed With: patient        Progress: no change  Outcome Summary: patient resting comfortably between care, voiding per purewick, increased pain in shoulder this evening, PO medication given and ice applied, educated on b/p and glucose monitoring

## 2021-10-12 NOTE — PROGRESS NOTES
Name: Jessica Barnes ADMIT: 10/6/2021   : 1955  PCP: Kadeem Arteaga Jr., MD    MRN: [id number] LOS: 4 days   AGE/SEX: 65 y.o. female  ROOM: [facility]     Subjective   Subjective   Resting in bed. She reports frustration with the orthopedic plans. States she wants to talk to Dr. Ramos about her shoulder. She was wanting to have both her leg and shoulder done at the same time. Reports pain improved with medication. Worse with movement. Denies any nausea or vomiting. She is eating. She feels overwhelmed and embarrassed. She does not like having to have so much help. She denies any chest pain, dyspnea, cough, fever or chills. Denies any history of lung or heart problems.     Objective   Objective   Vital Signs  Temp:  [97 °F (36.1 °C)-98.1 °F (36.7 °C)] 98.1 °F (36.7 °C)  Heart Rate:  [73-88] 80  Resp:  [16-18] 17  BP: (101-135)/(53-58) 124/55  SpO2:  [94 %] 94 %  on   ;   Device (Oxygen Therapy): room air  Body mass index is 40.37 kg/m².     Physical Exam  Vitals and nursing note reviewed.   Constitutional:       General: She is not in acute distress.     Appearance: She is obese. She is ill-appearing. She is not toxic-appearing.   HENT:      Head: Normocephalic and atraumatic.   Cardiovascular:      Rate and Rhythm: Normal rate and regular rhythm.      Pulses: Normal pulses.      Heart sounds: Normal heart sounds.   Pulmonary:      Effort: Pulmonary effort is normal. No respiratory distress.      Breath sounds: Normal breath sounds.   Abdominal:      General: Bowel sounds are normal. There is no distension.      Palpations: Abdomen is soft.      Tenderness: There is no abdominal tenderness.   Musculoskeletal:         General: Swelling and tenderness present.      Cervical back: Normal range of motion and neck supple.      Comments: Left upper extremity in sling. Left lower extremity in splint.   Skin:     General: Skin is warm and dry.      Findings: No bruising.   Neurological:      Mental Status: She is  alert and oriented to person, place, and time.      Sensory: No sensory deficit.      Motor: Weakness present.      Coordination: Coordination normal.   Psychiatric:         Mood and Affect: Mood normal.         Behavior: Behavior normal.      Comments: Somewhat tearful/overwhelmed       Results Review:       I reviewed the patient's new clinical results.  Results from last 7 days   Lab Units 10/11/21  0714 10/09/21  0834 10/07/21  0606 10/06/21  1745   WBC 10*3/mm3 9.49 10.63 10.14 17.14*   HEMOGLOBIN g/dL 11.3* 11.4* 12.5 13.8   PLATELETS 10*3/mm3 264 200 226 210     Results from last 7 days   Lab Units 10/11/21  0714 10/09/21  0834 10/07/21  0606 10/06/21  1745   SODIUM mmol/L 134* 129* 138 139   POTASSIUM mmol/L 4.0 3.5 3.7 3.6   CHLORIDE mmol/L 97* 94* 101 100   CO2 mmol/L 25.5 25.0 22.5 26.7   BUN mg/dL 47* 43* 23 22   CREATININE mg/dL 0.66 0.71 0.57 0.63   GLUCOSE mg/dL 143* 117* 83 101*   Estimated Creatinine Clearance: 88.2 mL/min (by C-G formula based on SCr of 0.66 mg/dL).  Results from last 7 days   Lab Units 10/09/21  0834 10/06/21  1745   ALBUMIN g/dL 3.40* 4.20   BILIRUBIN mg/dL  --  0.3   ALK PHOS U/L  --  99   AST (SGOT) U/L  --  54*   ALT (SGPT) U/L  --  20     Results from last 7 days   Lab Units 10/11/21  0714 10/09/21  0834 10/07/21  0606 10/06/21  1745   CALCIUM mg/dL 8.7 8.6 9.4 9.8   ALBUMIN g/dL  --  3.40*  --  4.20   MAGNESIUM mg/dL  --  2.2  --   --    PHOSPHORUS mg/dL  --  2.9  --   --        Glucose   Date/Time Value Ref Range Status   10/12/2021 0603 112 70 - 130 mg/dL Final     Comment:     Meter: [id number] : [id number] Adeline Terrell RN   10/11/2021 2116 165 (H) 70 - 130 mg/dL Final     Comment:     Meter: [id number] : [id number] Adeline Terrell RN   10/11/2021 1549 231 (H) 70 - 130 mg/dL Final     Comment:     Meter: [id number] : [id number] Chaitanya SON   10/11/2021 1024 155 (H) 70 - 130 mg/dL Final     Comment:     Meter: [id number] : [id number]  Chaitanya Aguilar NA   10/11/2021 0706 139 (H) 70 - 130 mg/dL Final     Comment:     Meter: [id number] : [id number] Talat SON   10/10/2021 2109 150 (H) 70 - 130 mg/dL Final     Comment:     Meter: [id number] : [id number] Talat Cintron NA   10/10/2021 1545 132 (H) 70 - 130 mg/dL Final     Comment:     Meter: [id number] : [id number] Laura SON       amLODIPine, 10 mg, Oral, Q24H  atorvastatin, 10 mg, Oral, Daily  cholecalciferol, 1,000 Units, Oral, Daily  insulin glargine, 100 Units, Subcutaneous, Nightly  insulin lispro, 0-9 Units, Subcutaneous, TID With Meals  insulin lispro, 15 Units, Subcutaneous, Daily With Lunch & Dinner  lisinopril, 20 mg, Oral, Q24H  polyethylene glycol, 17 g, Oral, BID       Diet Regular; Consistent Carbohydrate       Assessment/Plan     Active Hospital Problems    Diagnosis  POA   • **Closed fracture of left distal fibula [S82.832A]  Unknown   • Hyponatremia [E87.1]  Unknown   • Drug-induced constipation [K59.03]  Unknown   • Diabetes mellitus (HCC) [E11.9]  Unknown   • Hypertension [I10]  Unknown   • BMI 40.0-44.9, adult (HCC) [Z68.41]  Not Applicable   • Closed fracture of left proximal humerus [S42.202A]  Yes      Resolved Hospital Problems   No resolved problems to display.     Ms. Barnes is a 65 year old female who presented to the hospital after a mechanical fall at home with subsequent left arm and left foot pain.     · Closed fracture of the left distal fibula: Dr. Orta with Orthopedic surgery is managing. He plans to take her to the OR for ORIF tomorrow for operative repair. Pain medication as needed. LLE NWB in splint.  · Closed fracture of left proximal humerus: Dr. Ramos with Orthopedic surgery is managing. Surgeons trying to coordinate time to have both procedures done together. Plans for left reverse TSA. Pain medication as above. LUE NWB in sling.  · DM2: Sugars well controlled on high-dosed home Lantus and TID meal-time. Continue SSI and monitor ACHS.  A1c 6.6%.  · Constipation: Bowel regimen in place. Monitor for response.  · HTN: BP stable on Norvasc and lisinopril. Renal function good.  · Hyponatremia: Stable and better today. Will trend.     Discussed with patient and nursing staff.    Surgery tomorrow. Possibly both? We discussed rehab placement and she understands this is most likely needed given her NWB status on the left side.    VTE Prophylaxis - SCDs  Code Status - Full code  Disposition - Anticipate discharge to SNF for rehab once medically stable from surgeries.      LIAM Conte  [facility]  10/12/21  09:50 EDT

## 2021-10-12 NOTE — PLAN OF CARE
Goal Outcome Evaluation:               L humerus/ L ankle fracture, patient will be NPO after midnight, You to do ORIF L ankle fx and Richard operating on left humerus fracture, norco q hrs, bedrest continues, jeffrey in  place,  this shift, educated on the importance of BG monitoring related to hx of diabetes

## 2021-10-12 NOTE — SIGNIFICANT NOTE
Spoke with Dr. Ramos via phone. Dr. Ramos relayed that he is aware of patient admission and he is planning to do surgery. Dr. Ramos stated that he is working with Dr. Orta in an effort to coordinate a time and plan for the patient's surgery. Dr. Ramos plans to come see her this evening. This information was shared with the patient who voiced understanding.

## 2021-10-13 ENCOUNTER — APPOINTMENT (OUTPATIENT)
Dept: GENERAL RADIOLOGY | Facility: HOSPITAL | Age: 66
End: 2021-10-13

## 2021-10-13 ENCOUNTER — ANESTHESIA EVENT (OUTPATIENT)
Dept: PERIOP | Facility: HOSPITAL | Age: 66
End: 2021-10-13

## 2021-10-13 ENCOUNTER — ANESTHESIA (OUTPATIENT)
Dept: PERIOP | Facility: HOSPITAL | Age: 66
End: 2021-10-13

## 2021-10-13 LAB
ALBUMIN SERPL-MCNC: 3.2 G/DL (ref 3.5–5.2)
ALBUMIN/GLOB SERPL: 1.1 G/DL
ALP SERPL-CCNC: 166 U/L (ref 39–117)
ALT SERPL W P-5'-P-CCNC: 13 U/L (ref 1–33)
ANION GAP SERPL CALCULATED.3IONS-SCNC: 8.8 MMOL/L (ref 5–15)
AST SERPL-CCNC: 16 U/L (ref 1–32)
BILIRUB SERPL-MCNC: 0.4 MG/DL (ref 0–1.2)
BUN SERPL-MCNC: 23 MG/DL (ref 8–23)
BUN/CREAT SERPL: 44.2 (ref 7–25)
CALCIUM SPEC-SCNC: 8.8 MG/DL (ref 8.6–10.5)
CHLORIDE SERPL-SCNC: 101 MMOL/L (ref 98–107)
CO2 SERPL-SCNC: 28.2 MMOL/L (ref 22–29)
CREAT SERPL-MCNC: 0.52 MG/DL (ref 0.57–1)
DEPRECATED RDW RBC AUTO: 44.1 FL (ref 37–54)
ERYTHROCYTE [DISTWIDTH] IN BLOOD BY AUTOMATED COUNT: 13.9 % (ref 12.3–15.4)
GFR SERPL CREATININE-BSD FRML MDRD: 118 ML/MIN/1.73
GLOBULIN UR ELPH-MCNC: 2.9 GM/DL
GLUCOSE BLDC GLUCOMTR-MCNC: 134 MG/DL (ref 70–130)
GLUCOSE BLDC GLUCOMTR-MCNC: 136 MG/DL (ref 70–130)
GLUCOSE BLDC GLUCOMTR-MCNC: 167 MG/DL (ref 70–130)
GLUCOSE BLDC GLUCOMTR-MCNC: 193 MG/DL (ref 70–130)
GLUCOSE SERPL-MCNC: 127 MG/DL (ref 65–99)
HCT VFR BLD AUTO: 36.2 % (ref 34–46.6)
HGB BLD-MCNC: 11.2 G/DL (ref 12–15.9)
MCH RBC QN AUTO: 26.7 PG (ref 26.6–33)
MCHC RBC AUTO-ENTMCNC: 30.9 G/DL (ref 31.5–35.7)
MCV RBC AUTO: 86.4 FL (ref 79–97)
PLATELET # BLD AUTO: 264 10*3/MM3 (ref 140–450)
PMV BLD AUTO: 9.5 FL (ref 6–12)
POTASSIUM SERPL-SCNC: 4.2 MMOL/L (ref 3.5–5.2)
PROT SERPL-MCNC: 6.1 G/DL (ref 6–8.5)
RBC # BLD AUTO: 4.19 10*6/MM3 (ref 3.77–5.28)
SODIUM SERPL-SCNC: 138 MMOL/L (ref 136–145)
WBC # BLD AUTO: 9.72 10*3/MM3 (ref 3.4–10.8)

## 2021-10-13 PROCEDURE — 25010000003 CEFAZOLIN PER 500 MG: Performed by: ORTHOPAEDIC SURGERY

## 2021-10-13 PROCEDURE — 76942 ECHO GUIDE FOR BIOPSY: CPT | Performed by: ORTHOPAEDIC SURGERY

## 2021-10-13 PROCEDURE — C1713 ANCHOR/SCREW BN/BN,TIS/BN: HCPCS | Performed by: ORTHOPAEDIC SURGERY

## 2021-10-13 PROCEDURE — 25010000002 NEOSTIGMINE 5 MG/10ML SOLUTION: Performed by: NURSE ANESTHETIST, CERTIFIED REGISTERED

## 2021-10-13 PROCEDURE — 0RRK00Z REPLACEMENT OF LEFT SHOULDER JOINT WITH REVERSE BALL AND SOCKET SYNTHETIC SUBSTITUTE, OPEN APPROACH: ICD-10-PCS | Performed by: ORTHOPAEDIC SURGERY

## 2021-10-13 PROCEDURE — 25010000002 MIDAZOLAM PER 1 MG: Performed by: ANESTHESIOLOGY

## 2021-10-13 PROCEDURE — 25010000002 HYDROMORPHONE PER 4 MG: Performed by: NURSE ANESTHETIST, CERTIFIED REGISTERED

## 2021-10-13 PROCEDURE — 25010000002 ONDANSETRON PER 1 MG: Performed by: NURSE ANESTHETIST, CERTIFIED REGISTERED

## 2021-10-13 PROCEDURE — 25010000002 FENTANYL CITRATE (PF) 50 MCG/ML SOLUTION: Performed by: NURSE ANESTHETIST, CERTIFIED REGISTERED

## 2021-10-13 PROCEDURE — C1889 IMPLANT/INSERT DEVICE, NOC: HCPCS | Performed by: ORTHOPAEDIC SURGERY

## 2021-10-13 PROCEDURE — 76000 FLUOROSCOPY <1 HR PHYS/QHP: CPT

## 2021-10-13 PROCEDURE — 73600 X-RAY EXAM OF ANKLE: CPT

## 2021-10-13 PROCEDURE — C1776 JOINT DEVICE (IMPLANTABLE): HCPCS | Performed by: ORTHOPAEDIC SURGERY

## 2021-10-13 PROCEDURE — 25010000003 BUPIVACAINE LIPOSOME 1.3 % SUSPENSION: Performed by: ANESTHESIOLOGY

## 2021-10-13 PROCEDURE — 80053 COMPREHEN METABOLIC PANEL: CPT | Performed by: NURSE PRACTITIONER

## 2021-10-13 PROCEDURE — 25010000002 HYDROMORPHONE PER 4 MG: Performed by: ORTHOPAEDIC SURGERY

## 2021-10-13 PROCEDURE — 94002 VENT MGMT INPAT INIT DAY: CPT

## 2021-10-13 PROCEDURE — 85027 COMPLETE CBC AUTOMATED: CPT | Performed by: NURSE PRACTITIONER

## 2021-10-13 PROCEDURE — 25010000002 PHENYLEPHRINE 10 MG/ML SOLUTION: Performed by: NURSE ANESTHETIST, CERTIFIED REGISTERED

## 2021-10-13 PROCEDURE — 25010000002 VANCOMYCIN 1 G RECONSTITUTED SOLUTION: Performed by: ORTHOPAEDIC SURGERY

## 2021-10-13 PROCEDURE — C9290 INJ, BUPIVACAINE LIPOSOME: HCPCS | Performed by: ANESTHESIOLOGY

## 2021-10-13 PROCEDURE — 63710000001 INSULIN GLARGINE PER 5 UNITS: Performed by: ORTHOPAEDIC SURGERY

## 2021-10-13 PROCEDURE — 25010000002 FENTANYL CITRATE (PF) 50 MCG/ML SOLUTION: Performed by: ANESTHESIOLOGY

## 2021-10-13 PROCEDURE — 73020 X-RAY EXAM OF SHOULDER: CPT

## 2021-10-13 PROCEDURE — 25010000002 PROPOFOL 10 MG/ML EMULSION: Performed by: NURSE ANESTHETIST, CERTIFIED REGISTERED

## 2021-10-13 PROCEDURE — 25010000002 ROPIVACAINE PER 1 MG: Performed by: ANESTHESIOLOGY

## 2021-10-13 PROCEDURE — 0QSK04Z REPOSITION LEFT FIBULA WITH INTERNAL FIXATION DEVICE, OPEN APPROACH: ICD-10-PCS | Performed by: ORTHOPAEDIC SURGERY

## 2021-10-13 PROCEDURE — 94799 UNLISTED PULMONARY SVC/PX: CPT

## 2021-10-13 PROCEDURE — 25010000002 ONDANSETRON PER 1 MG: Performed by: ORTHOPAEDIC SURGERY

## 2021-10-13 PROCEDURE — 25010000002 MIDAZOLAM PER 1 MG: Performed by: NURSE ANESTHETIST, CERTIFIED REGISTERED

## 2021-10-13 PROCEDURE — 82962 GLUCOSE BLOOD TEST: CPT

## 2021-10-13 PROCEDURE — 0QSH04Z REPOSITION LEFT TIBIA WITH INTERNAL FIXATION DEVICE, OPEN APPROACH: ICD-10-PCS | Performed by: ORTHOPAEDIC SURGERY

## 2021-10-13 PROCEDURE — 25010000002 DEXAMETHASONE PER 1 MG: Performed by: NURSE ANESTHETIST, CERTIFIED REGISTERED

## 2021-10-13 DEVICE — CMT BONE PALACOS R HI/VISC 1X40: Type: IMPLANTABLE DEVICE | Site: SHOULDER | Status: FUNCTIONAL

## 2021-10-13 DEVICE — SUT LP BONE NICELOOP NONABS W/NDL SZ5 24IN GRN: Type: IMPLANTABLE DEVICE | Site: SHOULDER | Status: FUNCTIONAL

## 2021-10-13 DEVICE — SCRW LK EQUINOXE GLENOSPHERE REV/SHLDR: Type: IMPLANTABLE DEVICE | Site: SHOULDER | Status: FUNCTIONAL

## 2021-10-13 DEVICE — SCREW, NON-LOCKING, 3.5 X 22MM
Type: IMPLANTABLE DEVICE | Site: ANKLE | Status: FUNCTIONAL
Brand: MEDLINE UNITE

## 2021-10-13 DEVICE — IMPLANTABLE DEVICE: Type: IMPLANTABLE DEVICE | Site: SHOULDER | Status: FUNCTIONAL

## 2021-10-13 DEVICE — KWIRE EQUINOXE GLENOID NITNL 2X190MM NS: Type: IMPLANTABLE DEVICE | Site: SHOULDER | Status: FUNCTIONAL

## 2021-10-13 DEVICE — TRY HUM EQUINOXE ADPT REV SHLDR PLS5: Type: IMPLANTABLE DEVICE | Site: SHOULDER | Status: FUNCTIONAL

## 2021-10-13 DEVICE — SCRW EQUINOXE TORQ DEFINE REV SHLDR KT: Type: IMPLANTABLE DEVICE | Site: SHOULDER | Status: FUNCTIONAL

## 2021-10-13 DEVICE — BASEPLT REV SHLDR EQUINOXE SM: Type: IMPLANTABLE DEVICE | Site: SHOULDER | Status: FUNCTIONAL

## 2021-10-13 DEVICE — SCRW COMPR EQUINOXE LK 4.5X22MM: Type: IMPLANTABLE DEVICE | Site: SHOULDER | Status: FUNCTIONAL

## 2021-10-13 DEVICE — SCREW, NON-LOCKING, 3.5 X 14MM
Type: IMPLANTABLE DEVICE | Site: ANKLE | Status: FUNCTIONAL
Brand: MEDLINE UNITE

## 2021-10-13 DEVICE — SCREW, NON-LOCKING, 3.5 X 12MM
Type: IMPLANTABLE DEVICE | Site: ANKLE | Status: FUNCTIONAL
Brand: MEDLINE UNITE

## 2021-10-13 DEVICE — LINER HUM EQUINOXE REV SHLDR PLS0 36MM: Type: IMPLANTABLE DEVICE | Site: SHOULDER | Status: FUNCTIONAL

## 2021-10-13 DEVICE — SCRW COMPR EQUINOXE LK 4.5X26MM: Type: IMPLANTABLE DEVICE | Site: SHOULDER | Status: FUNCTIONAL

## 2021-10-13 DEVICE — SUT LP BONE NICELOOP NONABS W/NDL SZ5 24IN WHT: Type: IMPLANTABLE DEVICE | Site: SHOULDER | Status: FUNCTIONAL

## 2021-10-13 DEVICE — SCREW, LOCKING, 3.5 X 14MM
Type: IMPLANTABLE DEVICE | Site: ANKLE | Status: FUNCTIONAL
Brand: MEDLINE UNITE

## 2021-10-13 DEVICE — IMPLANTABLE DEVICE
Type: IMPLANTABLE DEVICE | Site: ANKLE | Status: FUNCTIONAL
Brand: JUGGERKNOT SOFT ANCHORS

## 2021-10-13 DEVICE — PLATE, LATERAL FIBULA, XSML, LEFT
Type: IMPLANTABLE DEVICE | Site: ANKLE | Status: FUNCTIONAL
Brand: PENDING

## 2021-10-13 DEVICE — GLENOSPHERE REV SHLDR EQUINOXE 36MM SM: Type: IMPLANTABLE DEVICE | Site: SHOULDER | Status: FUNCTIONAL

## 2021-10-13 RX ORDER — ROCURONIUM BROMIDE 10 MG/ML
INJECTION, SOLUTION INTRAVENOUS AS NEEDED
Status: DISCONTINUED | OUTPATIENT
Start: 2021-10-13 | End: 2021-10-13 | Stop reason: SURG

## 2021-10-13 RX ORDER — MORPHINE SULFATE 2 MG/ML
4 INJECTION, SOLUTION INTRAMUSCULAR; INTRAVENOUS
Status: DISCONTINUED | OUTPATIENT
Start: 2021-10-13 | End: 2021-10-17 | Stop reason: HOSPADM

## 2021-10-13 RX ORDER — GLYCOPYRROLATE 0.2 MG/ML
INJECTION INTRAMUSCULAR; INTRAVENOUS AS NEEDED
Status: DISCONTINUED | OUTPATIENT
Start: 2021-10-13 | End: 2021-10-13 | Stop reason: SURG

## 2021-10-13 RX ORDER — PROMETHAZINE HYDROCHLORIDE 25 MG/1
25 SUPPOSITORY RECTAL ONCE AS NEEDED
Status: DISCONTINUED | OUTPATIENT
Start: 2021-10-13 | End: 2021-10-13 | Stop reason: HOSPADM

## 2021-10-13 RX ORDER — CLINDAMYCIN PHOSPHATE 900 MG/50ML
INJECTION INTRAVENOUS AS NEEDED
Status: DISCONTINUED | OUTPATIENT
Start: 2021-10-13 | End: 2021-10-13 | Stop reason: SURG

## 2021-10-13 RX ORDER — MIDAZOLAM HYDROCHLORIDE 1 MG/ML
INJECTION INTRAMUSCULAR; INTRAVENOUS
Status: COMPLETED | OUTPATIENT
Start: 2021-10-13 | End: 2021-10-13

## 2021-10-13 RX ORDER — FENTANYL CITRATE 50 UG/ML
INJECTION, SOLUTION INTRAMUSCULAR; INTRAVENOUS
Status: COMPLETED | OUTPATIENT
Start: 2021-10-13 | End: 2021-10-13

## 2021-10-13 RX ORDER — MIDAZOLAM HYDROCHLORIDE 1 MG/ML
0.5 INJECTION INTRAMUSCULAR; INTRAVENOUS
Status: DISCONTINUED | OUTPATIENT
Start: 2021-10-13 | End: 2021-10-13 | Stop reason: HOSPADM

## 2021-10-13 RX ORDER — LIDOCAINE HYDROCHLORIDE 10 MG/ML
0.5 INJECTION, SOLUTION EPIDURAL; INFILTRATION; INTRACAUDAL; PERINEURAL ONCE AS NEEDED
Status: DISCONTINUED | OUTPATIENT
Start: 2021-10-13 | End: 2021-10-13 | Stop reason: HOSPADM

## 2021-10-13 RX ORDER — SODIUM CHLORIDE, SODIUM LACTATE, POTASSIUM CHLORIDE, CALCIUM CHLORIDE 600; 310; 30; 20 MG/100ML; MG/100ML; MG/100ML; MG/100ML
9 INJECTION, SOLUTION INTRAVENOUS CONTINUOUS
Status: DISCONTINUED | OUTPATIENT
Start: 2021-10-13 | End: 2021-10-17 | Stop reason: HOSPADM

## 2021-10-13 RX ORDER — HYDROMORPHONE HYDROCHLORIDE 1 MG/ML
0.5 INJECTION, SOLUTION INTRAMUSCULAR; INTRAVENOUS; SUBCUTANEOUS
Status: DISCONTINUED | OUTPATIENT
Start: 2021-10-13 | End: 2021-10-13 | Stop reason: HOSPADM

## 2021-10-13 RX ORDER — MAGNESIUM HYDROXIDE 1200 MG/15ML
LIQUID ORAL AS NEEDED
Status: DISCONTINUED | OUTPATIENT
Start: 2021-10-13 | End: 2021-10-13 | Stop reason: HOSPADM

## 2021-10-13 RX ORDER — MIDAZOLAM HYDROCHLORIDE 1 MG/ML
INJECTION INTRAMUSCULAR; INTRAVENOUS AS NEEDED
Status: DISCONTINUED | OUTPATIENT
Start: 2021-10-13 | End: 2021-10-13 | Stop reason: SURG

## 2021-10-13 RX ORDER — ONDANSETRON 2 MG/ML
INJECTION INTRAMUSCULAR; INTRAVENOUS AS NEEDED
Status: DISCONTINUED | OUTPATIENT
Start: 2021-10-13 | End: 2021-10-13 | Stop reason: SURG

## 2021-10-13 RX ORDER — ONDANSETRON 2 MG/ML
4 INJECTION INTRAMUSCULAR; INTRAVENOUS ONCE AS NEEDED
Status: DISCONTINUED | OUTPATIENT
Start: 2021-10-13 | End: 2021-10-13 | Stop reason: HOSPADM

## 2021-10-13 RX ORDER — PROPOFOL 10 MG/ML
VIAL (ML) INTRAVENOUS CONTINUOUS PRN
Status: DISCONTINUED | OUTPATIENT
Start: 2021-10-13 | End: 2021-10-13 | Stop reason: SURG

## 2021-10-13 RX ORDER — SODIUM CHLORIDE 450 MG/100ML
75 INJECTION, SOLUTION INTRAVENOUS CONTINUOUS
Status: DISCONTINUED | OUTPATIENT
Start: 2021-10-13 | End: 2021-10-15

## 2021-10-13 RX ORDER — OXYCODONE AND ACETAMINOPHEN 7.5; 325 MG/1; MG/1
2 TABLET ORAL EVERY 4 HOURS PRN
Status: DISCONTINUED | OUTPATIENT
Start: 2021-10-13 | End: 2021-10-17 | Stop reason: HOSPADM

## 2021-10-13 RX ORDER — NEOSTIGMINE METHYLSULFATE 0.5 MG/ML
INJECTION, SOLUTION INTRAVENOUS AS NEEDED
Status: DISCONTINUED | OUTPATIENT
Start: 2021-10-13 | End: 2021-10-13 | Stop reason: SURG

## 2021-10-13 RX ORDER — HYDROMORPHONE HYDROCHLORIDE 1 MG/ML
0.5 INJECTION, SOLUTION INTRAMUSCULAR; INTRAVENOUS; SUBCUTANEOUS
Status: DISCONTINUED | OUTPATIENT
Start: 2021-10-13 | End: 2021-10-17 | Stop reason: HOSPADM

## 2021-10-13 RX ORDER — NALOXONE HCL 0.4 MG/ML
0.4 VIAL (ML) INJECTION
Status: DISCONTINUED | OUTPATIENT
Start: 2021-10-13 | End: 2021-10-17 | Stop reason: HOSPADM

## 2021-10-13 RX ORDER — SODIUM CHLORIDE 0.9 % (FLUSH) 0.9 %
3 SYRINGE (ML) INJECTION EVERY 12 HOURS SCHEDULED
Status: DISCONTINUED | OUTPATIENT
Start: 2021-10-13 | End: 2021-10-13 | Stop reason: HOSPADM

## 2021-10-13 RX ORDER — ROPIVACAINE HYDROCHLORIDE 2 MG/ML
INJECTION, SOLUTION EPIDURAL; INFILTRATION; PERINEURAL
Status: COMPLETED | OUTPATIENT
Start: 2021-10-13 | End: 2021-10-13

## 2021-10-13 RX ORDER — EPHEDRINE SULFATE 50 MG/ML
5 INJECTION, SOLUTION INTRAVENOUS ONCE AS NEEDED
Status: DISCONTINUED | OUTPATIENT
Start: 2021-10-13 | End: 2021-10-13 | Stop reason: HOSPADM

## 2021-10-13 RX ORDER — CLINDAMYCIN PHOSPHATE 600 MG/50ML
600 INJECTION INTRAVENOUS ONCE
Status: COMPLETED | OUTPATIENT
Start: 2021-10-13 | End: 2021-10-13

## 2021-10-13 RX ORDER — LABETALOL HYDROCHLORIDE 5 MG/ML
5 INJECTION, SOLUTION INTRAVENOUS
Status: DISCONTINUED | OUTPATIENT
Start: 2021-10-13 | End: 2021-10-13 | Stop reason: HOSPADM

## 2021-10-13 RX ORDER — SODIUM CHLORIDE 0.9 % (FLUSH) 0.9 %
3-10 SYRINGE (ML) INJECTION AS NEEDED
Status: DISCONTINUED | OUTPATIENT
Start: 2021-10-13 | End: 2021-10-13 | Stop reason: HOSPADM

## 2021-10-13 RX ORDER — PROPOFOL 10 MG/ML
VIAL (ML) INTRAVENOUS AS NEEDED
Status: DISCONTINUED | OUTPATIENT
Start: 2021-10-13 | End: 2021-10-13 | Stop reason: SURG

## 2021-10-13 RX ORDER — DROPERIDOL 2.5 MG/ML
0.62 INJECTION, SOLUTION INTRAMUSCULAR; INTRAVENOUS ONCE
Status: DISCONTINUED | OUTPATIENT
Start: 2021-10-13 | End: 2021-10-17 | Stop reason: HOSPADM

## 2021-10-13 RX ORDER — MORPHINE SULFATE 10 MG/ML
6 INJECTION INTRAMUSCULAR; INTRAVENOUS; SUBCUTANEOUS
Status: DISCONTINUED | OUTPATIENT
Start: 2021-10-13 | End: 2021-10-17 | Stop reason: HOSPADM

## 2021-10-13 RX ORDER — LIDOCAINE HYDROCHLORIDE 20 MG/ML
INJECTION, SOLUTION INFILTRATION; PERINEURAL AS NEEDED
Status: DISCONTINUED | OUTPATIENT
Start: 2021-10-13 | End: 2021-10-13 | Stop reason: SURG

## 2021-10-13 RX ORDER — HYDRALAZINE HYDROCHLORIDE 20 MG/ML
5 INJECTION INTRAMUSCULAR; INTRAVENOUS
Status: DISCONTINUED | OUTPATIENT
Start: 2021-10-13 | End: 2021-10-13 | Stop reason: HOSPADM

## 2021-10-13 RX ORDER — CLINDAMYCIN PHOSPHATE 600 MG/50ML
600 INJECTION INTRAVENOUS EVERY 8 HOURS
Status: COMPLETED | OUTPATIENT
Start: 2021-10-13 | End: 2021-10-14

## 2021-10-13 RX ORDER — DIPHENHYDRAMINE HYDROCHLORIDE 50 MG/ML
12.5 INJECTION INTRAMUSCULAR; INTRAVENOUS
Status: DISCONTINUED | OUTPATIENT
Start: 2021-10-13 | End: 2021-10-13 | Stop reason: HOSPADM

## 2021-10-13 RX ORDER — VANCOMYCIN HYDROCHLORIDE 1 G/20ML
INJECTION, POWDER, LYOPHILIZED, FOR SOLUTION INTRAVENOUS AS NEEDED
Status: DISCONTINUED | OUTPATIENT
Start: 2021-10-13 | End: 2021-10-13 | Stop reason: HOSPADM

## 2021-10-13 RX ORDER — PHENYLEPHRINE HYDROCHLORIDE 10 MG/ML
INJECTION INTRAVENOUS AS NEEDED
Status: DISCONTINUED | OUTPATIENT
Start: 2021-10-13 | End: 2021-10-13 | Stop reason: SURG

## 2021-10-13 RX ORDER — FENTANYL CITRATE 50 UG/ML
50 INJECTION, SOLUTION INTRAMUSCULAR; INTRAVENOUS
Status: DISCONTINUED | OUTPATIENT
Start: 2021-10-13 | End: 2021-10-13 | Stop reason: HOSPADM

## 2021-10-13 RX ORDER — ASPIRIN 325 MG
325 TABLET ORAL EVERY 12 HOURS
Status: DISCONTINUED | OUTPATIENT
Start: 2021-10-13 | End: 2021-10-17 | Stop reason: HOSPADM

## 2021-10-13 RX ORDER — BUPIVACAINE HYDROCHLORIDE 2.5 MG/ML
INJECTION, SOLUTION EPIDURAL; INFILTRATION; INTRACAUDAL
Status: COMPLETED | OUTPATIENT
Start: 2021-10-13 | End: 2021-10-13

## 2021-10-13 RX ORDER — FAMOTIDINE 10 MG/ML
20 INJECTION, SOLUTION INTRAVENOUS ONCE
Status: COMPLETED | OUTPATIENT
Start: 2021-10-13 | End: 2021-10-13

## 2021-10-13 RX ORDER — DIPHENHYDRAMINE HCL 25 MG
25 CAPSULE ORAL
Status: DISCONTINUED | OUTPATIENT
Start: 2021-10-13 | End: 2021-10-13 | Stop reason: HOSPADM

## 2021-10-13 RX ORDER — PROMETHAZINE HYDROCHLORIDE 25 MG/1
25 TABLET ORAL ONCE AS NEEDED
Status: DISCONTINUED | OUTPATIENT
Start: 2021-10-13 | End: 2021-10-13 | Stop reason: HOSPADM

## 2021-10-13 RX ORDER — OXYCODONE AND ACETAMINOPHEN 7.5; 325 MG/1; MG/1
1 TABLET ORAL EVERY 4 HOURS PRN
Status: DISCONTINUED | OUTPATIENT
Start: 2021-10-13 | End: 2021-10-17 | Stop reason: HOSPADM

## 2021-10-13 RX ORDER — DEXAMETHASONE SODIUM PHOSPHATE 10 MG/ML
INJECTION INTRAMUSCULAR; INTRAVENOUS AS NEEDED
Status: DISCONTINUED | OUTPATIENT
Start: 2021-10-13 | End: 2021-10-13 | Stop reason: SURG

## 2021-10-13 RX ORDER — NALOXONE HCL 0.4 MG/ML
0.2 VIAL (ML) INJECTION AS NEEDED
Status: DISCONTINUED | OUTPATIENT
Start: 2021-10-13 | End: 2021-10-13 | Stop reason: HOSPADM

## 2021-10-13 RX ADMIN — PHENYLEPHRINE HYDROCHLORIDE 100 MCG: 10 INJECTION, SOLUTION INTRAVENOUS at 10:46

## 2021-10-13 RX ADMIN — Medication 100 MCG/KG/MIN: at 10:32

## 2021-10-13 RX ADMIN — SUGAMMADEX 200 MG: 100 INJECTION, SOLUTION INTRAVENOUS at 16:04

## 2021-10-13 RX ADMIN — ROPIVACAINE HYDROCHLORIDE 20 ML: 2 INJECTION, SOLUTION EPIDURAL; INFILTRATION at 08:39

## 2021-10-13 RX ADMIN — CLINDAMYCIN PHOSPHATE 600 MG: 600 INJECTION, SOLUTION INTRAVENOUS at 09:11

## 2021-10-13 RX ADMIN — MIDAZOLAM 1 MG: 1 INJECTION INTRAMUSCULAR; INTRAVENOUS at 10:40

## 2021-10-13 RX ADMIN — GLYCOPYRROLATE 0.4 MG: 0.2 INJECTION INTRAMUSCULAR; INTRAVENOUS at 15:56

## 2021-10-13 RX ADMIN — PHENYLEPHRINE HYDROCHLORIDE 100 MCG: 10 INJECTION, SOLUTION INTRAVENOUS at 10:56

## 2021-10-13 RX ADMIN — INSULIN GLARGINE 100 UNITS: 100 INJECTION, SOLUTION SUBCUTANEOUS at 21:19

## 2021-10-13 RX ADMIN — PHENYLEPHRINE HYDROCHLORIDE 100 MCG: 10 INJECTION, SOLUTION INTRAVENOUS at 09:47

## 2021-10-13 RX ADMIN — ROCURONIUM BROMIDE 50 MG: 50 INJECTION INTRAVENOUS at 09:25

## 2021-10-13 RX ADMIN — HYDROMORPHONE HYDROCHLORIDE 0.5 MG: 1 INJECTION, SOLUTION INTRAMUSCULAR; INTRAVENOUS; SUBCUTANEOUS at 23:44

## 2021-10-13 RX ADMIN — ATORVASTATIN CALCIUM 10 MG: 20 TABLET, FILM COATED ORAL at 20:22

## 2021-10-13 RX ADMIN — LISINOPRIL 20 MG: 20 TABLET ORAL at 20:22

## 2021-10-13 RX ADMIN — ROCURONIUM BROMIDE 30 MG: 50 INJECTION INTRAVENOUS at 10:28

## 2021-10-13 RX ADMIN — LIDOCAINE HYDROCHLORIDE 60 MG: 20 INJECTION, SOLUTION INFILTRATION; PERINEURAL at 09:25

## 2021-10-13 RX ADMIN — HYDROMORPHONE HYDROCHLORIDE 0.5 MG: 1 INJECTION, SOLUTION INTRAMUSCULAR; INTRAVENOUS; SUBCUTANEOUS at 19:30

## 2021-10-13 RX ADMIN — PHENYLEPHRINE HYDROCHLORIDE 100 MCG: 10 INJECTION, SOLUTION INTRAVENOUS at 14:40

## 2021-10-13 RX ADMIN — PHENYLEPHRINE HYDROCHLORIDE 100 MCG: 10 INJECTION, SOLUTION INTRAVENOUS at 15:26

## 2021-10-13 RX ADMIN — PHENYLEPHRINE HYDROCHLORIDE 200 MCG: 10 INJECTION, SOLUTION INTRAVENOUS at 15:04

## 2021-10-13 RX ADMIN — PHENYLEPHRINE HYDROCHLORIDE 100 MCG: 10 INJECTION, SOLUTION INTRAVENOUS at 11:16

## 2021-10-13 RX ADMIN — PHENYLEPHRINE HYDROCHLORIDE 100 MCG: 10 INJECTION, SOLUTION INTRAVENOUS at 10:36

## 2021-10-13 RX ADMIN — FENTANYL CITRATE 50 MCG: 50 INJECTION INTRAMUSCULAR; INTRAVENOUS at 16:49

## 2021-10-13 RX ADMIN — PHENYLEPHRINE HYDROCHLORIDE 100 MCG: 10 INJECTION, SOLUTION INTRAVENOUS at 13:46

## 2021-10-13 RX ADMIN — MIDAZOLAM 1 MG: 1 INJECTION INTRAMUSCULAR; INTRAVENOUS at 08:28

## 2021-10-13 RX ADMIN — PHENYLEPHRINE HYDROCHLORIDE 100 MCG: 10 INJECTION, SOLUTION INTRAVENOUS at 09:38

## 2021-10-13 RX ADMIN — PHENYLEPHRINE HYDROCHLORIDE 100 MCG: 10 INJECTION, SOLUTION INTRAVENOUS at 13:35

## 2021-10-13 RX ADMIN — ONDANSETRON 4 MG: 2 INJECTION INTRAMUSCULAR; INTRAVENOUS at 15:47

## 2021-10-13 RX ADMIN — PHENYLEPHRINE HYDROCHLORIDE 100 MCG: 10 INJECTION, SOLUTION INTRAVENOUS at 15:47

## 2021-10-13 RX ADMIN — PHENYLEPHRINE HYDROCHLORIDE 100 MCG: 10 INJECTION, SOLUTION INTRAVENOUS at 10:47

## 2021-10-13 RX ADMIN — NEOSTIGMINE METHYLSULFATE 3 MG: 0.5 INJECTION INTRAVENOUS at 15:56

## 2021-10-13 RX ADMIN — SODIUM CHLORIDE, POTASSIUM CHLORIDE, SODIUM LACTATE AND CALCIUM CHLORIDE 9 ML/HR: 600; 310; 30; 20 INJECTION, SOLUTION INTRAVENOUS at 08:52

## 2021-10-13 RX ADMIN — CLINDAMYCIN PHOSPHATE 600 MG: 600 INJECTION, SOLUTION INTRAVENOUS at 20:21

## 2021-10-13 RX ADMIN — ROCURONIUM BROMIDE 20 MG: 50 INJECTION INTRAVENOUS at 13:21

## 2021-10-13 RX ADMIN — PHENYLEPHRINE HYDROCHLORIDE 100 MCG: 10 INJECTION, SOLUTION INTRAVENOUS at 09:43

## 2021-10-13 RX ADMIN — ONDANSETRON 4 MG: 2 INJECTION INTRAMUSCULAR; INTRAVENOUS at 19:29

## 2021-10-13 RX ADMIN — BUPIVACAINE 10 ML: 13.3 INJECTION, SUSPENSION, LIPOSOMAL INFILTRATION at 08:56

## 2021-10-13 RX ADMIN — ASPIRIN 325 MG: 325 TABLET ORAL at 21:19

## 2021-10-13 RX ADMIN — DEXAMETHASONE SODIUM PHOSPHATE 8 MG: 10 INJECTION INTRAMUSCULAR; INTRAVENOUS at 14:02

## 2021-10-13 RX ADMIN — PHENYLEPHRINE HYDROCHLORIDE 100 MCG: 10 INJECTION, SOLUTION INTRAVENOUS at 10:59

## 2021-10-13 RX ADMIN — PHENYLEPHRINE HYDROCHLORIDE 100 MCG: 10 INJECTION, SOLUTION INTRAVENOUS at 13:37

## 2021-10-13 RX ADMIN — OXYCODONE AND ACETAMINOPHEN 2 TABLET: 7.5; 325 TABLET ORAL at 20:29

## 2021-10-13 RX ADMIN — PHENYLEPHRINE HYDROCHLORIDE 100 MCG: 10 INJECTION, SOLUTION INTRAVENOUS at 10:01

## 2021-10-13 RX ADMIN — SODIUM CHLORIDE 75 ML/HR: 4.5 INJECTION, SOLUTION INTRAVENOUS at 19:30

## 2021-10-13 RX ADMIN — CLINDAMYCIN IN 5 PERCENT DEXTROSE 900 MG: 18 INJECTION, SOLUTION INTRAVENOUS at 13:42

## 2021-10-13 RX ADMIN — PROPOFOL 200 MG: 10 INJECTION, EMULSION INTRAVENOUS at 09:24

## 2021-10-13 RX ADMIN — FAMOTIDINE 20 MG: 10 INJECTION INTRAVENOUS at 08:53

## 2021-10-13 RX ADMIN — BUPIVACAINE HYDROCHLORIDE 10 ML: 2.5 INJECTION, SOLUTION EPIDURAL; INFILTRATION; INTRACAUDAL; PERINEURAL at 08:56

## 2021-10-13 RX ADMIN — PHENYLEPHRINE HYDROCHLORIDE 100 MCG: 10 INJECTION, SOLUTION INTRAVENOUS at 10:32

## 2021-10-13 RX ADMIN — HYDROMORPHONE HYDROCHLORIDE 0.5 MG: 1 INJECTION, SOLUTION INTRAMUSCULAR; INTRAVENOUS; SUBCUTANEOUS at 17:28

## 2021-10-13 RX ADMIN — PHENYLEPHRINE HYDROCHLORIDE 200 MCG: 10 INJECTION, SOLUTION INTRAVENOUS at 14:55

## 2021-10-13 RX ADMIN — FENTANYL CITRATE 50 MCG: 0.05 INJECTION, SOLUTION INTRAMUSCULAR; INTRAVENOUS at 08:28

## 2021-10-13 RX ADMIN — ROPIVACAINE HYDROCHLORIDE 10 ML: 2 INJECTION, SOLUTION EPIDURAL; INFILTRATION; PERINEURAL at 08:43

## 2021-10-13 RX ADMIN — MIDAZOLAM 1 MG: 1 INJECTION INTRAMUSCULAR; INTRAVENOUS at 10:28

## 2021-10-13 RX ADMIN — PHENYLEPHRINE HYDROCHLORIDE 100 MCG: 10 INJECTION, SOLUTION INTRAVENOUS at 09:33

## 2021-10-13 RX ADMIN — AMLODIPINE BESYLATE 10 MG: 10 TABLET ORAL at 20:22

## 2021-10-13 NOTE — ANESTHESIA PROCEDURE NOTES
Peripheral Block    Pre-sedation assessment completed: 10/13/2021 8:34 AM    Patient reassessed immediately prior to procedure    Patient location during procedure: pre-op  Start time: 10/13/2021 8:40 AM  Stop time: 10/13/2021 8:43 AM  Reason for block: at surgeon's request and post-op pain management  Performed by  Anesthesiologist: Bronson Raymundo MD  Preanesthetic Checklist  Completed: patient identified, IV checked, site marked, risks and benefits discussed, surgical consent, monitors and equipment checked, pre-op evaluation and timeout performed  Prep:  Pt Position: supine  Sterile barriers:cap, gloves, mask, sterile barriers and washed/disinfected hands  Prep: ChloraPrep  Patient monitoring: blood pressure monitoring, continuous pulse oximetry and EKG  Procedure  Sedation:yes  Performed under: local infiltration  Guidance:ultrasound guided  Images:still images obtained    Laterality:left  Block Type:adductor canal block  Injection Technique:single-shot  Needle Type:echogenic and short-bevel  Needle Gauge:21 G  Resistance on Injection: none    Medications Used: ropivacaine (NAROPIN) 0.2 % injection, 10 mL  Med admintered at 10/13/2021 8:43 AM      Post Assessment  Injection Assessment: negative aspiration for heme, no paresthesia on injection and incremental injection  Patient Tolerance:comfortable throughout block  Complications:no  Additional Notes  Ultrasound guidance used for location of target, insertion of needle, injection of medication, and placement confirmation.

## 2021-10-13 NOTE — ANESTHESIA PREPROCEDURE EVALUATION
Anesthesia Evaluation     Patient summary reviewed   history of anesthetic complications: PONV  NPO Solid Status: > 8 hours  NPO Liquid Status: > 2 hours           Airway   Mallampati: II  TM distance: >3 FB  Neck ROM: full  Dental          Pulmonary     breath sounds clear to auscultation  (-) shortness of breath, not a smoker  Cardiovascular   Exercise tolerance: good (4-7 METS)    ECG reviewed  Rhythm: regular  Rate: normal    (+) hypertension, hyperlipidemia,   (-) angina, BARNETT      Neuro/Psych  GI/Hepatic/Renal/Endo    (+) morbid obesity,  diabetes mellitus,     Musculoskeletal     Abdominal    Substance History      OB/GYN          Other   arthritis,                      Anesthesia Plan    ASA 3     general   (Nerve block(s) requested by surgeon for post-op pain control. Risks, benefits, and alternatives discussed with patient or patient representative who agrees to proceed with plan.     I had a detailed conversation with the patient prior to sedation regarding the plan for anesthesia services between OR cases. We have come the the decision in coordination with the OR director and PACU nurses to perform the ankle case, then transport the patient intubated and sedated to the PACU, where she will be ventilated and sedated while the OR is changed over to accommodate the shoulder surgery. Once the OR and surgeon are ready, she will be transported back to the OR for the shoulder surgery. Risks, benefits, and alternatives were discussed. Pt. Voiced understanding and agreed to proceed with this plan.   )  intravenous induction     Anesthetic plan, all risks, benefits, and alternatives have been provided, discussed and informed consent has been obtained with: patient.

## 2021-10-13 NOTE — ANESTHESIA PROCEDURE NOTES
Airway  Urgency: elective    Date/Time: 10/13/2021 9:27 AM  Airway not difficult    General Information and Staff    Patient location during procedure: OR  Anesthesiologist: Bronson Raymundo MD  CRNA: Sharon Greenfield CRNA    Indications and Patient Condition  Indications for airway management: airway protection    Preoxygenated: yes  MILS not maintained throughout  Mask difficulty assessment: 1 - vent by mask    Final Airway Details  Final airway type: endotracheal airway      Successful airway: ETT  Cuffed: yes   Successful intubation technique: direct laryngoscopy  Facilitating devices/methods: Bougie  Endotracheal tube insertion site: oral  Blade: Leonila  Blade size: 3  ETT size (mm): 7.5  Cormack-Lehane Classification: grade IIa - partial view of glottis  Placement verified by: chest auscultation and capnometry   Measured from: lips  ETT/EBT  to lips (cm): 22  Number of attempts at approach: 1  Assessment: lips, teeth, and gum same as pre-op and atraumatic intubation    Additional Comments  PreO2 100%, FEO2 >85, SIVI, easy BMV, sniff position, ETT placed/ confirmed, attraumatic, teeth and lips as preop.

## 2021-10-13 NOTE — PROGRESS NOTES
[facility]    PRO[facility]ESS NOTE    Name:  Jessica Barnes   Age:  65 y.o.  Sex:  female  :  1955  MRN:  [id number]   Visit Number:  [id number]  Admission Date:  10/6/2021  Date Of Service:  10/13/21  Primary Care Physician:  Kadeem Arteaga Jr., MD     LOS: 5 days :  Patient Care Team:  Kadeem Arteaga Jr., MD as PCP - General (Family Medicine):    History taken from:     patient chart    Chief Complaint:      Left shoulder pain.    Subjective     Interval History:     Patient seen again this morning.    Patient has distal fib fracture as well as left humerus fracture.  She has been followed by orthopedic surgery.    She is going to surgery this morning for ORIF of left ankle and left reverse total shoulder arthroplasty. Will need rehab after that.   She also has diabetes mellitus type 2 and hypertension.    She currently denies any chest pressure, shortness of breath, nausea, vomiting, pain.  Her labs, vitals, blood sugars are stable.    Her pain is under control at present.  She is eager to get the surgeries done with so she can move on to rehab.      Review of Systems:     All systems were reviewed and negative except for:  Musculoskeletal: positive for  Left arm pain.    Objective     Vital Signs:    Temp:  [96.9 °F (36.1 °C)-99.3 °F (37.4 °C)] 98.3 °F (36.8 °C)  Heart Rate:  [75-86] 75  Resp:  [16-18] 16  BP: (114-134)/(53-63) 114/63    Physical Exam:    General: Alert and oriented x4, obese, mild distress  Heart: Regular rate and rhythm without murmur rub or thrill.  Lungs: Clear to auscultation bilaterally without use of accessory muscles or respiration.  Abdomen: Soft/nontender/nondistended.  No HSM noted.  MSK: Left arm in sling.  4/5 strength in right upper and lower extremities.     Results Review:      I reviewed the patient's new clinical results.  I reviewed the patient's new imaging results and agree with the interpretation.    Labs:    Lab Results (last 24 hours)      Procedure Component Value Units Date/Time    Comprehensive Metabolic Panel [[id number]]  (Abnormal) Collected: 10/13/21 0606    Specimen: Blood Updated: 10/13/21 0744     Glucose 127 mg/dL      BUN 23 mg/dL      Creatinine 0.52 mg/dL      Sodium 138 mmol/L      Potassium 4.2 mmol/L      Chloride 101 mmol/L      CO2 28.2 mmol/L      Calcium 8.8 mg/dL      Total Protein 6.1 g/dL      Albumin 3.20 g/dL      ALT (SGPT) 13 U/L      AST (SGOT) 16 U/L      Alkaline Phosphatase 166 U/L      Total Bilirubin 0.4 mg/dL      eGFR Non African Amer 118 mL/min/1.73      Globulin 2.9 gm/dL      A/G Ratio 1.1 g/dL      BUN/Creatinine Ratio 44.2     Anion Gap 8.8 mmol/L     Narrative:      GFR Normal >60  Chronic Kidney Disease <60  Kidney Failure <15      CBC (No Diff) [[id number]]  (Abnormal) Collected: 10/13/21 0606    Specimen: Blood Updated: 10/13/21 0729     WBC 9.72 10*3/mm3      RBC 4.19 10*6/mm3      Hemoglobin 11.2 g/dL      Hematocrit 36.2 %      MCV 86.4 fL      MCH 26.7 pg      MCHC 30.9 g/dL      RDW 13.9 %      RDW-SD 44.1 fl      MPV 9.5 fL      Platelets 264 10*3/mm3     POC Glucose Once [[id number]]  (Abnormal) Collected: 10/13/21 0605    Specimen: Blood Updated: 10/13/21 0607     Glucose 134 mg/dL      Comment: Meter: [id number] : [id number] Tobi Banks CNA       POC Glucose Once [[id number]]  (Normal) Collected: 10/12/21 2101    Specimen: Blood Updated: 10/12/21 2102     Glucose 121 mg/dL      Comment: Meter: [id number] : [id number] Penelope SON       COVID PRE-OP / PRE-PROCEDURE SCREENING ORDER (NO ISOLATION) - Swab, Nasopharynx [[id number]]  (Normal) Collected: 10/12/21 1654    Specimen: Swab from Nasopharynx Updated: 10/12/21 2008    Narrative:      The following orders were created for panel order COVID PRE-OP / PRE-PROCEDURE SCREENING ORDER (NO ISOLATION) - Swab, Nasopharynx.  Procedure                               Abnormality         Status                     ---------                                -----------         ------                     COVID-19,[facility] IN-HOUSE...[[id number]]  Normal              Final result                 Please view results for these tests on the individual orders.    COVID-19,[facility] IN-HOUSE CEPHEID/MARICEL NP SWAB IN TRANSPORT MEDIA 8-12 HR TAT - Swab, Nasopharynx [[id number]]  (Normal) Collected: 10/12/21 1654    Specimen: Swab from Nasopharynx Updated: 10/12/21 2008     COVID19 Not Detected    Narrative:      Fact sheet for providers: https://www.fda.gov/media/108013/download    Fact sheet for patients: https://www.fda.gov/media/690017/download    Test performed by PCR.    POC Glucose Once [[id number]]  (Abnormal) Collected: 10/12/21 1535    Specimen: Blood Updated: 10/12/21 1537     Glucose 138 mg/dL      Comment: Meter: [id number] : [id number] Zoila SON       POC Glucose Once [[id number]]  (Abnormal) Collected: 10/12/21 1209    Specimen: Blood Updated: 10/12/21 1211     Glucose 185 mg/dL      Comment: Meter: [id number] : [id number] Zoila SON              Radiology:    Imaging Results (Last 24 Hours)     ** No results found for the last 24 hours. **          Medication Review:     amLODIPine, 10 mg, Oral, Q24H  atorvastatin, 10 mg, Oral, Daily  cholecalciferol, 1,000 Units, Oral, Daily  insulin glargine, 100 Units, Subcutaneous, Nightly  insulin lispro, 0-9 Units, Subcutaneous, TID With Meals  insulin lispro, 15 Units, Subcutaneous, Daily With Lunch & Dinner  lisinopril, 20 mg, Oral, Q24H  polyethylene glycol, 17 g, Oral, BID             Assessment/Plan     Principal Problem:    Closed fracture of left distal fibula      Overview: Added automatically from request for surgery [id number]  Active Problems:    Closed fracture of left proximal humerus    Diabetes mellitus (HCC)    Hypertension    BMI 40.0-44.9, adult (HCC)    Hyponatremia    Drug-induced constipation      1.  Closed fracture left distal fibula status post fall  2.  Closed fracture left  proximal humerus status post mechanical fall  3.  Diabetes mellitus type 2, hemoglobin A1c 6.60.  4.  Essential hypertension, controlled  5.  Morbid obesity.    Plan:    Surgery today.  Monitor blood pressure and monitor blood sugars.  These appear stable.  Lab work in the a.m.  Further recommendations will depend on clinical course.    Gibran Diallo DO  10/13/21  07:55 EDT

## 2021-10-13 NOTE — OP NOTE
TOTAL SHOULDER REVERSE ARTHROPLASTY  Procedure Note    Jessica Barnes  10/13/2021    Pre-op Diagnosis:   Left 4 part comminuted displaced proximal humeral fracture    Post-op Diagnosis  Left 4 part comminuted displaced proximal humeral fracture    Procedure:  Left reverse total shoulder arthroplasty for proximal humeral fracture    Surgeon: Patrick Ramos M.D.    Surgical assistant: Gemma López CSA      Anesthesia: General with Block  Anesthesiologist: Bronson Raymundo MD; Jacinto Jaeger MD  CRNA: Magaly Britton CRNA; Sharon Greenfield CRNA    Staff:   Circulator: Patrizia Caldwell RN; Michelle Berry RN; Pamela Paris RN; Tiffanie Grimes RN  Scrub Person: Carmen Maciel; [place]  Assistant: Gemma López    Indication for Asst.  A surgical assistant, Gemma López CSA , was utilized as a medical necessity and was present through the entire procedure allowing safe completion of the procedure as well as reducing overall operative time and morbidity for the patient.    IV antibiotic: Cleocin    Estimated Blood Loss: 300 ml    Specimens: * No orders in the log *    Complications: None    Implants: [employer] reverse total shoulder system     Implant specifics: 6.5 mm left humeral fracture stem, +5 humeral tray, +0 humeral liner 36 mm, small glenoid baseplate with 4 compression screws and locking caps with 36 mm glenosphere,4 Scott nice loops for tuberosity repair      Procedure: The patient was taken to the operative suite.  The patient already had anesthesia established from a prior open reduction internal fixation of her left ankle.  We carefully positioned her in the beachchair position.  The head was placed in a neutral position and bony prominences were padded.  Ioban was utilized covering the skin over the shoulder and axilla.  An anterior incision was made starting lateral to the coracoid towards the axillary crease through skin and subcutaneous tissues.  The deltopectoral  interval was entered.  The cephalic vein was preserved.  The conjoined tendon was reflected medially.  The biceps tendon was identified and tenodesed to the pectoralis.  The tendon was then tenotomized more proximally.  Obvious severe fracture was noted proximally.  Hematoma was evacuated.  There was a fracture through the head as well as the tuberosities.  Dissection was carried down to retract the lesser and greater tuberosities and the head was able to be excised and some of the superior cuff was released although there was cuff attached to the greater tuberosity that was retracted posteriorly.  This was preserved.  The subscapularis was divided and tagged.  Arthropathic changes were noted in the glenohumeral joint.  The rotator cuff was in poor condition with tearing noted.  The capsule was released off the humeral neck and the posterior soft tissue attachments were protected.  Carefully the shaft was exposed and she had an intact medial calcar but the tuberosities had been fractured away leaving essentially a tubular humeral shaft.  Vigorously irrigated and suctioned and then hand reamed but distally as able only to hand reamed to a seven mm to the full depth.  I attempted to ream 9 mm but was only able to get within about a centimeter and a half of the distance needed for a standard stem or a larger fracture stem such as an 8 and half millimeter stem.  Once I had the greater tuberosity isolated with cuff tendon and the shaft had been reamed I turned my attention to the glenoid. Soft tissues were dissected off the superior glenoid including the biceps stump.  This allowed for visualization of the glenoid.  Dissection was carried down inferiorly where the labrum was released off the inferior glenoid and an anterior capsular release was performed.  The glenoid was fully exposed and visually looked to be a small baseplate.  A central guidepin was drilled and reaming was carried out attempting to preserve as much  bone as possible.  The guide was replaced and a central cage hole was drilled.  Autogenous bone graft was harvested from the humeral head and packed into the cage of the small baseplate.  The baseplate was then compressed onto the glenoid and held with 4 compression screws and locking caps.  Stable fixation was noted.  An appropriate size 36 mm glenosphere was chosen and fixed to the baseplate with a central compression screw.  Next I turned my attention back to the humeral shaft where a pulse lavaged and dried the bone.  I placed a fracture stem with a 0 tray and poly and reduce the shoulder so that I can see the depth that the stem should be placed during cementing.  We initially contemplated a press-fit but I was unable to get a standard stem to really stably fit within the tube of the humeral shaft.  I did not feel comfortable going uncemented.  Therefore we move forward and once the shoulder was reduced and I had appropriate length I remove the humeral components and pulse lavaged and dried the bone.  A cement restrictor was placed.  Cement was placed within the proximal canal and a 6.5 mm fracture stem was placed and held at the appropriate 20 degree retroversion at the predetermine length.  Once the cemented fully hardened and excess cement was removed I again trialed the shoulder.  I decided to go with a +0.5 tray and a 0 poly which gave good stable consistent range of motion.  The trial components were removed.  The appropriate +5 humeral tray was in place and held with a torque limiting screw.  The +0 36 mm polyethylene was then inserted and compressed into place and the shoulder was reduced.  Subdeltoid scar tissue was excised area  Good range of motion was noted.  Good stability was noted.  I placed a nice loop sutures through the infraspinatus and teres minor and using the prescribed technique brought for the suture ends around the posterior and medial side of the stem and 4 of him from the cuff tissues  and tuberosity around the lateral side of the stem.  Sliding suture knots were used to compress the tuberosity and tendon onto the proximal aspect of the construct.  Good secure fixation was noted.  Vigorous irrigation and suctioning was performed. The deltopectoral interval was closed with 0 Vicryl.  The subcutaneous tissues were closed with 2-0 Vicryl.  The skin was closed with a zip line device.  The patient had a sterile compression dressing applied and was awoken and taken to the postanesthetic recovery unit in good condition.    Patrick Ramos MD     Date: 10/13/2021  Time: 16:12 EDT

## 2021-10-13 NOTE — OP NOTE
Procedure Note    Jessica Barnes  10/13/2021    Pre-op Diagnosis:   1.  Closed displaced left ankle bimalleolar ankle fracture  2.  Left ankle syndesmosis disruption  3.  Possible left ankle deltoid ligament instability/disruption       Post-Op Diagnosis Codes:  1.  Closed displaced left ankle bimalleolar ankle fracture  2.  Left ankle syndesmosis disruption  3.  Left ankle deltoid ligament instability/disruption    Procedure:  1.  Open reduction internal fixation, left bimalleolar ankle fracture  2.  Open reduction internal fixation, left ankle syndesmosis  3.  Secondary reconstruction, left medial deltoid ankle ligament    Surgeon(s):  Jacinto Orta Jr., MD     Assistant:  None    Anesthesia: General with Block    Estimated Blood Loss: minimal    Specimens:                None    Drains:  None    Complications:   None apparent    Disposition:  Stable; patient remained under anesthesia for open treatment/reverse total shoulder for a comminuted left proximal humerus fracture performed separately by Dr. Ramos.  Please refer to his operative note for full details.    Indications for procedure:  The patient is a pleasant 65-year-old female who suffered a closed displaced left bimalleolar ankle fracture following mechanical fall.  Operative stabilization was recommended.  The risks/benefits of surgery, including pain, infection, wound healing problems, need for future procedures, mal/nonunion, DVT/PE, cardiac event, and/or death were discussed, and the patient elected to proceed with surgery.      Procedure in detail:  The correct patient was identified in preoperative holding.  All risks and benefits of surgery were again discussed in detail, and the patient agreed to proceed with surgery.  The operative extremity was confirmed and marked.  Operative consent reviewed and confirmed to be signed.    At this time, the patient was wheeled to the operative theatre and placed supine on the OR table.  Anesthesia was induced  smoothly by our anesthesia colleagues.  The left lower extremity was prepped and draped in standard sterile fashion.  Appropriate presurgical timeout was performed, confirming correct patient, correct extremity, correct procedure, availability of sterile instruments/implants, and the administration of intravenous antibiotics within one hour of skin incision.    The distal fibula is palpated at the lateral ankle and a longitudinal incision is made centered over it with a #15 blade.  Careful subcutaneous dissection is carried down to the fibula, taking great care to look for and protect any branches of the superficial peroneal nerve.  A #15 blade is then used to gain appropriate subperiosteal dissection at the identified distal fibula fracture.  Hematoma and fibrous contents are gently removed from the fracture site.  An anatomic reduction is performed with manipulation of the ankle and a small reduction clamp, confirmed by direct inspection and with intra-operative fluoroscopy.  We proceed with placement of a 3.5 mm screw in lag fashion across the oblique fracture with excellent compression and maintained anatomic reduction as assessed directly but also with intraoperative fluoroscopy.    An appropriately-sized Medline distal fibular locking plate is then fashioned on the lateral portion of the distal fibula in appropriate position and provisionally held in place with two olive wires.  We proceed with fixation of this neutralization device using 3.5 mm locking screws distally (unicortical) and 3.5 mm cortical screws proximally (bicortical).     Stress examination of the ankle under fluoroscopy and with direct inspection revealed persistent medial clear space widening, suggestive of entrapped deltoid ligament rupture.  Therefore, attention then turned medially, where a short longitudinal incision is made over the anteromedial ankle over the medial malleolus.  Careful subcutaneous dissection with tenotomy scissors is  performed to identify and protect the saphenous vein/nerve.  A deltoid sleeve avulsion injury is identified off the anterior and distal medial malleolus, invaginated into the medial ankle gutter.  Hematoma evacuated and medial joint inspected, no chondral damage seen on the medial talar dome.  I prepared the anterior/distal face of the medial malleolus with rongeur and then drilled and placed a single [employer] 1.45 double-loaded Juggernaut anchor in the medial malleolus, and in a pants-over-vest fashion repaired the deltoid sleeve avulsion back to the medial malleolus using the passed #1 Maxbraid sutures, completing the secondary medial ligament reconstruction.  The repair oversewn with 2-0 Vicryl suture.    At this time, direct inspection and manual stress tests (Cotton test on mortise/lateral views and ER stress test) were used along with intraoperative fluoroscopy to assess the stability of the syndesmosis and deltoid ligament.  Syndesmotic instability was evident radiographically, but also through direct inspection anterolaterally at the ankle.  The syndesmosis is reduced, with fibular position assessed under direct visualization, as well as by intraoperative fluoroscopy.  The ankle and distal syndesmosis remained well-reduced.  Using fluoroscopy as a guide, a tetracortical drill hole for a single, knotless, titanium Arthrex Tighrope was made in appropriate position and suture button device placed and secured into position.       Final fluoroscopic AP, mortise, and lateral views of the ankle confirmed anatomic reduction of the ankle/syndesmosis, including the very small posterior malleolus fracture, which reduced well in closed fashion without need for fixation.  All hardware was in appropriate position and of appropriate length.  Stress fluoroscopy confirms excellent stability to the ankle.    The wounds were thoroughly irrigated out of any debris and closed in meticulous, layered fashion with 2-0 Vicryl,  "3-0 Vicryl and finally staples on the skin.  The skin was cleaned and dried and a sterile dressing applied, followed by a short leg splint (posterior slab and stirrup) with the ankle in neutral position.  The tourniquet was released and brisk capillary refill returns to all toes.         Postoperative Plan:  Weightbearing status: Non-weightbearing in the splint  DVT Prophylaxis: Aspirin 325mg BID;  Patient will be instructed to elevate as much as possible (\"toes above the nose\") and to get up and move around at least once per hour while awake to help minimize risk of blood clots.              Jacinto Orta Jr, MD     Date: 10/13/2021  Time: 08:52 EDT        "

## 2021-10-13 NOTE — ANESTHESIA PROCEDURE NOTES
Peripheral Block    Pre-sedation assessment completed: 10/13/2021 8:34 AM    Patient reassessed immediately prior to procedure    Patient location during procedure: pre-op  Start time: 10/13/2021 8:52 AM  Stop time: 10/13/2021 8:56 AM  Reason for block: at surgeon's request and post-op pain management  Performed by  Anesthesiologist: Bronson Raymundo MD  Preanesthetic Checklist  Completed: patient identified, IV checked, site marked, risks and benefits discussed, surgical consent, monitors and equipment checked, pre-op evaluation and timeout performed  Prep:  Pt Position: supine  Sterile barriers:cap, gloves, mask and washed/disinfected hands  Prep: ChloraPrep  Patient monitoring: blood pressure monitoring, continuous pulse oximetry and EKG  Procedure  Sedation:yes  Performed under: local infiltration  Guidance:ultrasound guided  ULTRASOUND INTERPRETATION. Using ultrasound guidance a gauge needle was placed in close proximity to the brachial plexus nerve, at which point, under ultrasound guidance anesthetic was injected in the area of the nerve and spread of the anesthesia was seen on ultrasound in close proximity thereto.  There were no abnormalities seen on ultrasound; a digital image was taken; and the patient tolerated the procedure with no complications. Images:still images obtained    Laterality:left  Block Type:interscalene  Injection Technique:single-shot  Needle Type:echogenic and short-bevel  Needle Gauge:21 G  Resistance on Injection: none    Medications Used: bupivacaine liposome (EXPAREL) 1.3 % injection, 10 mL  bupivacaine PF (MARCAINE) 0.25 % injection, 10 mL  Med admintered at 10/13/2021 8:56 AM      Post Assessment  Injection Assessment: negative aspiration for heme, no paresthesia on injection and incremental injection  Patient Tolerance:comfortable throughout block  Complications:no  Additional Notes  Ultrasound guidance used for location of target, insertion of needle, injection of medication,  and placement confirmation.

## 2021-10-13 NOTE — ANESTHESIA POSTPROCEDURE EVALUATION
"Patient: Jessica Barnes    Procedure Summary     Date: 10/13/21 Room / Location: [facility] OR  / [facility] MAIN OR    Anesthesia Start: 0916 Anesthesia Stop: 1628    Procedures:       ANKLE OPEN REDUCTION INTERNAL FIXATION (Left Ankle)      LEFT TOTAL SHOULDER REVERSE ARTHROPLASTY (Left Shoulder) Diagnosis:       Other closed fracture of distal end of left fibula, initial encounter      (Other closed fracture of distal end of left fibula, initial encounter [S82.049F])    Surgeons: Patrick Ramos MD; Jacinto Orta Jr., MD Provider: Bronson Raymundo MD    Anesthesia Type: general ASA Status: 3          Anesthesia Type: general    Vitals  Vitals Value Taken Time   BP     Temp     Pulse     Resp 16 10/13/21 1106   SpO2 99 % 10/13/21 1106           Post Anesthesia Care and Evaluation    Patient location during evaluation: bedside  Patient participation: complete - patient participated  Level of consciousness: awake  Pain score: 2  Pain management: adequate  Airway patency: patent  Anesthetic complications: No anesthetic complications  PONV Status: none  Cardiovascular status: acceptable  Respiratory status: acceptable  Hydration status: acceptable    Comments: /63 (BP Location: Right arm, Patient Position: Lying)   Pulse 84   Temp 36.4 °C (97.5 °F) (Oral)   Resp 18   Ht 167.6 cm (65.98\")   Wt 113 kg (250 lb)   SpO2 92%   BMI 40.37 kg/m²         "

## 2021-10-13 NOTE — ANESTHESIA PROCEDURE NOTES
Peripheral Block    Pre-sedation assessment completed: 10/13/2021 8:34 AM    Patient reassessed immediately prior to procedure    Patient location during procedure: pre-op  Start time: 10/13/2021 8:35 AM  Stop time: 10/13/2021 8:39 AM  Reason for block: at surgeon's request and post-op pain management  Performed by  Anesthesiologist: Bronson Raymundo MD  Preanesthetic Checklist  Completed: patient identified, IV checked, site marked, risks and benefits discussed, surgical consent, monitors and equipment checked, pre-op evaluation and timeout performed  Prep:  Pt Position: supine  Sterile barriers:cap, gloves, mask, sterile barriers and washed/disinfected hands  Prep: ChloraPrep  Patient monitoring: blood pressure monitoring, continuous pulse oximetry and EKG  Procedure  Sedation:yes  Performed under: local infiltration  Guidance:ultrasound guided  ULTRASOUND INTERPRETATION. Using ultrasound guidance a 21 G gauge needle was placed in close proximity to the nerve, at which point, under ultrasound guidance anesthetic was injected in the area of the nerve and spread of the anesthesia was seen on ultrasound in close proximity thereto.  There were no abnormalities seen on ultrasound; a digital image was taken; and the patient tolerated the procedure with no complications. Images:still images obtained    Laterality:left  Block Type:popliteal  Injection Technique:single-shot  Needle Type:echogenic and short-bevel  Needle Gauge:21 G  Resistance on Injection: none    Medications Used: ropivacaine (NAROPIN) 0.2 % injection, 20 mL  Med admintered at 10/13/2021 8:39 AM      Post Assessment  Injection Assessment: negative aspiration for heme, no paresthesia on injection and incremental injection  Patient Tolerance:comfortable throughout block  Complications:no  Additional Notes  Ultrasound guidance used for location of target, insertion of needle, injection of medication, and placement confirmation.

## 2021-10-13 NOTE — PLAN OF CARE
Goal Outcome Evaluation:           Progress: no change   Pt admitted on 10/6 after a fall at home. Pt has a left humerus fracture and a left distal fibula. Pt has a splint to LLE. Pt is currently NPO in anticipation of surgery to have both fixed. Pt is on bedrest. Pt voiding via the Pure Wick. Voiding function intact. Pt seems very nervous about having 2 procedures done. Pt educated on the importance of monitoring blood pressures related to comorbidity of HTN. Pt voiced understanding. Pt is resting at this time, will continue to monitor,

## 2021-10-14 LAB
ALBUMIN SERPL-MCNC: 3 G/DL (ref 3.5–5.2)
ANION GAP SERPL CALCULATED.3IONS-SCNC: 9.1 MMOL/L (ref 5–15)
BUN SERPL-MCNC: 22 MG/DL (ref 8–23)
BUN/CREAT SERPL: 44.9 (ref 7–25)
CALCIUM SPEC-SCNC: 8.2 MG/DL (ref 8.6–10.5)
CHLORIDE SERPL-SCNC: 101 MMOL/L (ref 98–107)
CO2 SERPL-SCNC: 25.9 MMOL/L (ref 22–29)
CREAT SERPL-MCNC: 0.49 MG/DL (ref 0.57–1)
DEPRECATED RDW RBC AUTO: 42.6 FL (ref 37–54)
ERYTHROCYTE [DISTWIDTH] IN BLOOD BY AUTOMATED COUNT: 14 % (ref 12.3–15.4)
GFR SERPL CREATININE-BSD FRML MDRD: 126 ML/MIN/1.73
GLUCOSE BLDC GLUCOMTR-MCNC: 139 MG/DL (ref 70–130)
GLUCOSE BLDC GLUCOMTR-MCNC: 151 MG/DL (ref 70–130)
GLUCOSE BLDC GLUCOMTR-MCNC: 166 MG/DL (ref 70–130)
GLUCOSE BLDC GLUCOMTR-MCNC: 91 MG/DL (ref 70–130)
GLUCOSE SERPL-MCNC: 144 MG/DL (ref 65–99)
HCT VFR BLD AUTO: 29 % (ref 34–46.6)
HGB BLD-MCNC: 9.6 G/DL (ref 12–15.9)
MAGNESIUM SERPL-MCNC: 2.2 MG/DL (ref 1.6–2.4)
MCH RBC QN AUTO: 27.8 PG (ref 26.6–33)
MCHC RBC AUTO-ENTMCNC: 33.1 G/DL (ref 31.5–35.7)
MCV RBC AUTO: 84.1 FL (ref 79–97)
PHOSPHATE SERPL-MCNC: 3.2 MG/DL (ref 2.5–4.5)
PLATELET # BLD AUTO: 271 10*3/MM3 (ref 140–450)
PMV BLD AUTO: 9.7 FL (ref 6–12)
POTASSIUM SERPL-SCNC: 4.7 MMOL/L (ref 3.5–5.2)
RBC # BLD AUTO: 3.45 10*6/MM3 (ref 3.77–5.28)
SODIUM SERPL-SCNC: 136 MMOL/L (ref 136–145)
WBC # BLD AUTO: 12.19 10*3/MM3 (ref 3.4–10.8)

## 2021-10-14 PROCEDURE — 25010000002 HYDROMORPHONE PER 4 MG: Performed by: ORTHOPAEDIC SURGERY

## 2021-10-14 PROCEDURE — 63710000001 INSULIN LISPRO (HUMAN) PER 5 UNITS: Performed by: ORTHOPAEDIC SURGERY

## 2021-10-14 PROCEDURE — 83735 ASSAY OF MAGNESIUM: CPT | Performed by: ORTHOPAEDIC SURGERY

## 2021-10-14 PROCEDURE — 80069 RENAL FUNCTION PANEL: CPT | Performed by: ORTHOPAEDIC SURGERY

## 2021-10-14 PROCEDURE — 82962 GLUCOSE BLOOD TEST: CPT

## 2021-10-14 PROCEDURE — 63710000001 INSULIN GLARGINE PER 5 UNITS: Performed by: ORTHOPAEDIC SURGERY

## 2021-10-14 PROCEDURE — 85027 COMPLETE CBC AUTOMATED: CPT | Performed by: ORTHOPAEDIC SURGERY

## 2021-10-14 RX ADMIN — ASPIRIN 325 MG: 325 TABLET ORAL at 08:15

## 2021-10-14 RX ADMIN — OXYCODONE HYDROCHLORIDE AND ACETAMINOPHEN 1 TABLET: 7.5; 325 TABLET ORAL at 09:11

## 2021-10-14 RX ADMIN — CLINDAMYCIN PHOSPHATE 600 MG: 600 INJECTION, SOLUTION INTRAVENOUS at 10:47

## 2021-10-14 RX ADMIN — INSULIN GLARGINE 100 UNITS: 100 INJECTION, SOLUTION SUBCUTANEOUS at 22:09

## 2021-10-14 RX ADMIN — OXYCODONE AND ACETAMINOPHEN 2 TABLET: 7.5; 325 TABLET ORAL at 05:01

## 2021-10-14 RX ADMIN — ATORVASTATIN CALCIUM 10 MG: 20 TABLET, FILM COATED ORAL at 21:58

## 2021-10-14 RX ADMIN — OXYCODONE AND ACETAMINOPHEN 2 TABLET: 7.5; 325 TABLET ORAL at 22:18

## 2021-10-14 RX ADMIN — OXYCODONE AND ACETAMINOPHEN 2 TABLET: 7.5; 325 TABLET ORAL at 01:04

## 2021-10-14 RX ADMIN — INSULIN LISPRO 15 UNITS: 100 INJECTION, SOLUTION INTRAVENOUS; SUBCUTANEOUS at 17:47

## 2021-10-14 RX ADMIN — POLYETHYLENE GLYCOL 3350 17 G: 17 POWDER, FOR SOLUTION ORAL at 08:15

## 2021-10-14 RX ADMIN — OXYCODONE HYDROCHLORIDE AND ACETAMINOPHEN 1 TABLET: 7.5; 325 TABLET ORAL at 14:00

## 2021-10-14 RX ADMIN — INSULIN LISPRO 2 UNITS: 100 INJECTION, SOLUTION INTRAVENOUS; SUBCUTANEOUS at 08:14

## 2021-10-14 RX ADMIN — CLINDAMYCIN PHOSPHATE 600 MG: 600 INJECTION, SOLUTION INTRAVENOUS at 04:02

## 2021-10-14 RX ADMIN — POLYETHYLENE GLYCOL 3350 17 G: 17 POWDER, FOR SOLUTION ORAL at 21:58

## 2021-10-14 RX ADMIN — HYDROMORPHONE HYDROCHLORIDE 0.5 MG: 1 INJECTION, SOLUTION INTRAMUSCULAR; INTRAVENOUS; SUBCUTANEOUS at 06:07

## 2021-10-14 RX ADMIN — Medication 1000 UNITS: at 08:15

## 2021-10-14 RX ADMIN — HYDROMORPHONE HYDROCHLORIDE 0.5 MG: 1 INJECTION, SOLUTION INTRAMUSCULAR; INTRAVENOUS; SUBCUTANEOUS at 03:05

## 2021-10-14 RX ADMIN — OXYCODONE AND ACETAMINOPHEN 2 TABLET: 7.5; 325 TABLET ORAL at 18:02

## 2021-10-14 NOTE — PROGRESS NOTES
Continued Stay Note  [facility]     Patient Name: Jessica Barnes  MRN: [id number]  Today's Date: 10/14/2021    Admit Date: 10/6/2021     Discharge Plan     Row Name 10/14/21 2295       Plan    Plan Comments Spoke with pt, pt would like to d/c to [facility] . Referral sent in [facility] and  notified Yare[facility]. Pending approval/bed availability at this time, CCP to follow up in AM.               Discharge Codes    No documentation.                     Deb Ford RN

## 2021-10-14 NOTE — DISCHARGE INSTRUCTIONS
Dr. Ramos  [address] [facility] [address]  [phone]    TOTAL & REVERSE TOTAL SHOULDER REPLACEMENT  HOSPITAL DISCHARGE INSTRUCTIONS     GENERAL INFORMATION: With improved surgical techniques for total shoulder and reverse total shoulder replacement and the Gnosticism of ultrasound guided long acting nerve blocks, the hospital stay for patients has decreased significantly. Patients generally go home the following morning after consultation with a physical therapist.  SPECIFIC POST-OP INSTRUCTIONS:  * FOLLOW UP APPOINTMENT: You will need to call our office [phone] and make an appointment to follow up 10-14 days after your date of surgery. We can see you back sooner if there are any problems or concerns.   * BLOOD THINNERS: All patients will be on some type of blood thinner post-op to prevent blood clot. In the hospital, we often use a blood thinning shot the first day post-op. Most patients who are not already on a blood thinner are discharged on over-the-counter aspirin 325mg daily. If you were taking a blood thinner prior to surgery, we will have you resume this on the first day post-op.   * STAPLES: Staples are taken our 10-14 days post-op. This will be done during your first post-op appointment in our office.   * ICE: Ice helps to decrease both pain and swelling. Ice should be applied to the shoulder 20-25 minutes each hour while awake.   * DRESSING CHANGES: We ask that you keep the incision clean and dry. Please use water proof bandaids to cover incision while showering. These can be purchased at your local pharmacy. They may need to be overlapped to cover entire length of incision. These can be changed daily or as needed. Do not use any ointment on the incision.   * SHOWERING: The wound edges typically come together and seal by 3-5 days post-op if there is no drainage. Again, please use waterproof bandaids to cover incision while showering. DO NOT SUBMERSE SHOULDER IN POOL,HOT TUB OR BATH UNTIL AT LEAST 3-4  WEEKS POST-OP (EVEN WITH WATERPROOF BANDAIDS).  * PAIN  MEDICINE: You will be given a prescription for oral pain medication prior to discharge from the hospital. Please let us know if you have a sensitivity to certain pain medications prior to discharge. Additional pain medication prescriptions can be written, but must be picked up at either our [place] or [place] office locations. They can NOT be called into your pharmacy.   * ORAL ANTI-INFLAMMATORIES:   You will be asked to discontinue ALL oral anti-inflammatories prior to surgery. You can resume these the first day post-op.These can be combined with the oral pain medications safely. DO NOT TAKE ADDITIONAL TYLENOL WITH THE NARCOTIC PAIN MEDICATION (it already has Tylenol in it). If you were not taking an anti-inflammatory pre-op, you can start one on the first day post-op. It will help decrease pain and swelling. Typical medications and dosages are as follows:   Advil/Motrin/Ibuprofen 200mg, 4 pills every 8 hours   Aleve/Naproxen Sodium 220mg, 2 pills every 12 hours  * SLING: We recommend you wear the sling at all times, other than when showering or doing physical therapy exercises.    * WEIGHT BEARING: We ask that you be non-weight bearing on the operative shoulder. Do not use the operative arm to lift/push/pull greater than 2lbs.   * PHYSICAL THERAPY: A physical therapist will see you in the hospital your first day post-op. They will teach you some very gentle range of motion exercises to do at home for the first 10-14 days. After we see you in the office during your first post-op visit, we will give you a prescription to start formal physical therapy. This can be done downstairs at [facility] or at a location of your choice. Physical therapy is typically 2-3 times a week for 4-6 weeks, depending on the individual and their progress.   * DRIVING A CAR: Medically/legally we can not recommend you drive a car while in the sling or while on pain medication (roughly  "10-14 days).   * RETURNING TO DAILY AND RECREATIONAL ACTIVITIES: For the most part patients can progress to daily activities as tolerated (keeping in mind the restrictions listed above). Initially, we do not want you to \"overdo\" it in an attempt to minimize post-op pain and swelling. Once the swelling is controlled, you can progress with activities as tolerated. Pain and swelling should be your guide to increasing your activity level.   * RETURN TO WORK: The return to work date depends on many factors and is very dependent on the individual. You would most likely be able to return to a sedentary job after your first post-op visit (10-14 days after surgery). A more physical job would obviously require a longer recovery time before return to work.      Leave splint in place, clean and dry, until follow up    No weightbearing on operative leg    Elevate as much as possible (\"toes above the nose\")    Try to get up and move around at least once per hour while awake to help minimize risk of blood clots  Follow up in 2-3 weeks with Dr. Orta at the [facility] (call [phone] for appointment)    "

## 2021-10-14 NOTE — PROGRESS NOTES
"Orthopaedic Surgery  Daily Progress Note    /48 (BP Location: Right arm, Patient Position: Lying)   Pulse 84   Temp 97.1 °F (36.2 °C) (Skin)   Resp 16   Ht 167.6 cm (65.98\")   Wt 113 kg (250 lb)   SpO2 95%   BMI 40.37 kg/m²     Lab Results (last 24 hours)     Procedure Component Value Units Date/Time    Renal Function Panel [[id number]]  (Abnormal) Collected: 10/14/21 0606    Specimen: Blood Updated: 10/14/21 0732     Glucose 144 mg/dL      BUN 22 mg/dL      Creatinine 0.49 mg/dL      Sodium 136 mmol/L      Potassium 4.7 mmol/L      Chloride 101 mmol/L      CO2 25.9 mmol/L      Calcium 8.2 mg/dL      Albumin 3.00 g/dL      Phosphorus 3.2 mg/dL      Anion Gap 9.1 mmol/L      BUN/Creatinine Ratio 44.9     eGFR Non African Amer 126 mL/min/1.73     Narrative:      GFR Normal >60  Chronic Kidney Disease <60  Kidney Failure <15      Magnesium [[id number]]  (Normal) Collected: 10/14/21 0606    Specimen: Blood Updated: 10/14/21 0732     Magnesium 2.2 mg/dL     CBC (No Diff) [[id number]]  (Abnormal) Collected: 10/14/21 0606    Specimen: Blood Updated: 10/14/21 0730     WBC 12.19 10*3/mm3      RBC 3.45 10*6/mm3      Hemoglobin 9.6 g/dL      Hematocrit 29.0 %      MCV 84.1 fL      MCH 27.8 pg      MCHC 33.1 g/dL      RDW 14.0 %      RDW-SD 42.6 fl      MPV 9.7 fL      Platelets 271 10*3/mm3     POC Glucose Once [[id number]]  (Abnormal) Collected: 10/14/21 0620    Specimen: Blood Updated: 10/14/21 0632     Glucose 151 mg/dL      Comment: Meter: [id number] : [id number] Gilliland Saida NA       POC Glucose Once [[id number]]  (Abnormal) Collected: 10/13/21 2118    Specimen: Blood Updated: 10/13/21 2119     Glucose 193 mg/dL      Comment: Meter: [id number] : [id number] Gilliland Saida NA       POC Glucose Once [[id number]]  (Abnormal) Collected: 10/13/21 1630    Specimen: Blood Updated: 10/13/21 1632     Glucose 167 mg/dL      Comment: Meter: [id number] : [id number] French Knapp RN       POC Glucose Once [[id number]] "  (Abnormal) Collected: 10/13/21 0828    Specimen: Blood Updated: 10/13/21 0829     Glucose 136 mg/dL      Comment: Meter: [id number] : [id number] SON             Imaging Results (Last 24 Hours)     Procedure Component Value Units Date/Time    XR Shoulder 1 View Left [[id number]] Collected: 10/13/21 1705     Updated: 10/13/21 1709    Narrative:      XR SHOULDER 1 VW LEFT-  10/13/2021     HISTORY: Postop left shoulder arthroplasty.     The reverse shoulder arthroplasty is seen on the left. The glenoid and  proximal humerus components are well-seated with no abnormal surrounding  bony lucencies. Small amount of soft tissue air is seen. Skin staples  are seen.     No unexpected findings are noted.       Impression:      Satisfactory postoperative appearance of the left shoulder.     This report was finalized on 10/13/2021 5:06 PM by Dr. Brody Luciano M.D.       XR Ankle 2 View Left [[id number]] Collected: 10/13/21 1239     Updated: 10/13/21 1327    Narrative:      XR ANKLE 2 VW LEFT-     Fluoroscopy time 20 seconds.     6 C-arm images submitted from the operative suite.     CLINICAL INFORMATION: ORIF.     FINDINGS: Intraoperative C-arm images of the left ankle taken for the  purpose of visualization and localization during ORIF.     This report was finalized on 10/13/2021 1:24 PM by Dr. Ravi Saldivar M.D.       FL C Arm During Surgery [[id number]] Resulted: 10/13/21 1047     Updated: 10/13/21 1047    Narrative:      This procedure was auto-finalized with no dictation required.          Patient Care Team:  Kadeem Arteaga Jr., MD as PCP - General (Family Medicine)    SUBJECTIVE  Pain controlled    PHYSICAL EXAM  Resting in NAD  Splint clean, dry, intact  Toes warm, perfused, brisk capillary refill  Flexes/extends toes   SILT over toes  No pain with passive stretch    LUE: Sling in place, dressing c/d/i    Flexes/extends fingers.  Fingers WWP, BCR.  SILT over fingers           Closed fracture of  left distal fibula    Closed fracture of left proximal humerus    Diabetes mellitus (HCC)    Hypertension    BMI 40.0-44.9, adult (HCC)    Hyponatremia    Drug-induced constipation      PLAN / DISPOSITION:  POD1 s/p ORIF left ankle fracture and left rTSA , doing well  1. Pain control: Orals  2.  Antibiotics: Periop Ancef to end today  3.  PT: NWB in splint  4. DVT: ASA 325mg BID plus SAIGE/SCDs, mobilization  5. Dispo: ok for discharge to SNF from orthopaedic standpoint, follow up 2-3 weeks with me at [facility] ([phone] to make appointment)        Jacinto Orta Jr, MD  10/14/21  08:01 EDT

## 2021-10-14 NOTE — PROGRESS NOTES
[facility]    PRO[facility]ESS NOTE    Name:  Jessica Barnes   Age:  65 y.o.  Sex:  female  :  1955  MRN:  [id number]   Visit Number:  [id number]  Admission Date:  10/6/2021  Date Of Service:  10/14/21  Primary Care Physician:  Kadeem Arteaga Jr., MD     LOS: 6 days :  Patient Care Team:  Kadeem Arteaga Jr., MD as PCP - General (Family Medicine):    History taken from:     patient chart    Chief Complaint:      Left shoulder pain.    Subjective     Interval History:     Patient seen again today.    Patient has distal fib fracture as well as left humerus fracture.  She has been followed by orthopedic surgery.    She had surgery on 10/13/2021  with ORIF of left bimalleolar ankle fracture and left ankle syndesmosis.  She also had left reverse total shoulder arthroplasty for proximal humerus fracture.      She also has diabetes mellitus type 2 and hypertension.  These are being monitored.    She currently denies any chest pressure, shortness of breath, nausea, vomiting.  Her labs, vitals, blood sugars are stable.    Her pain is under control at present.    Surgery has signed off at this point, and okayed the patient for rehab.  Advised the patient and her  that the goal is for her to go to rehab as soon as possible.      Review of Systems:     All systems were reviewed and negative except for:  Musculoskeletal: positive for  Left arm pain.    Objective     Vital Signs:    Temp:  [97.1 °F (36.2 °C)-98.8 °F (37.1 °C)] 97.4 °F (36.3 °C)  Heart Rate:  [73-95] 86  Resp:  [14-18] 16  BP: (105-148)/(48-71) 115/54    Physical Exam:    General: Alert and oriented x4, obese, mild distress  Heart: Regular rate and rhythm without murmur rub or thrill.  Lungs: Clear to auscultation bilaterally without use of accessory muscles or respiration.  Abdomen: Soft/nontender/nondistended.  No HSM noted.  MSK: Left arm in sling.  4/5 strength in right upper and lower extremities.     Results Review:       I reviewed the patient's new clinical results.  I reviewed the patient's new imaging results and agree with the interpretation.    Labs:    Lab Results (last 24 hours)     Procedure Component Value Units Date/Time    POC Glucose Once [[id number]]  (Normal) Collected: 10/14/21 1130    Specimen: Blood Updated: 10/14/21 1133     Glucose 91 mg/dL      Comment: Meter: [id number] : [id number] Mai Fabiane HUY       Renal Function Panel [[id number]]  (Abnormal) Collected: 10/14/21 0606    Specimen: Blood Updated: 10/14/21 0732     Glucose 144 mg/dL      BUN 22 mg/dL      Creatinine 0.49 mg/dL      Sodium 136 mmol/L      Potassium 4.7 mmol/L      Chloride 101 mmol/L      CO2 25.9 mmol/L      Calcium 8.2 mg/dL      Albumin 3.00 g/dL      Phosphorus 3.2 mg/dL      Anion Gap 9.1 mmol/L      BUN/Creatinine Ratio 44.9     eGFR Non African Amer 126 mL/min/1.73     Narrative:      GFR Normal >60  Chronic Kidney Disease <60  Kidney Failure <15      Magnesium [[id number]]  (Normal) Collected: 10/14/21 0606    Specimen: Blood Updated: 10/14/21 0732     Magnesium 2.2 mg/dL     CBC (No Diff) [[id number]]  (Abnormal) Collected: 10/14/21 0606    Specimen: Blood Updated: 10/14/21 0730     WBC 12.19 10*3/mm3      RBC 3.45 10*6/mm3      Hemoglobin 9.6 g/dL      Hematocrit 29.0 %      MCV 84.1 fL      MCH 27.8 pg      MCHC 33.1 g/dL      RDW 14.0 %      RDW-SD 42.6 fl      MPV 9.7 fL      Platelets 271 10*3/mm3     POC Glucose Once [[id number]]  (Abnormal) Collected: 10/14/21 0620    Specimen: Blood Updated: 10/14/21 0632     Glucose 151 mg/dL      Comment: Meter: [id number] : [id number] Talat Hawthorneianna NA       POC Glucose Once [[id number]]  (Abnormal) Collected: 10/13/21 2118    Specimen: Blood Updated: 10/13/21 2119     Glucose 193 mg/dL      Comment: Meter: [id number] : [id number] Gilliland Saida NA       POC Glucose Once [[id number]]  (Abnormal) Collected: 10/13/21 1630    Specimen: Blood Updated: 10/13/21 1632     Glucose 167  mg/dL      Comment: Meter: [id number] : [id number] French Knapp RN              Radiology:    Imaging Results (Last 24 Hours)     Procedure Component Value Units Date/Time    XR Shoulder 1 View Left [[id number]] Collected: 10/13/21 1705     Updated: 10/13/21 1709    Narrative:      XR SHOULDER 1 VW LEFT-  10/13/2021     HISTORY: Postop left shoulder arthroplasty.     The reverse shoulder arthroplasty is seen on the left. The glenoid and  proximal humerus components are well-seated with no abnormal surrounding  bony lucencies. Small amount of soft tissue air is seen. Skin staples  are seen.     No unexpected findings are noted.       Impression:      Satisfactory postoperative appearance of the left shoulder.     This report was finalized on 10/13/2021 5:06 PM by Dr. Brody Luciano M.D.             Medication Review:     amLODIPine, 10 mg, Oral, Q24H  aspirin, 325 mg, Oral, Q12H  atorvastatin, 10 mg, Oral, Daily  cholecalciferol, 1,000 Units, Oral, Daily  droperidol, 0.625 mg, Intravenous, Once  insulin glargine, 100 Units, Subcutaneous, Nightly  insulin lispro, 0-9 Units, Subcutaneous, TID With Meals  insulin lispro, 15 Units, Subcutaneous, Daily With Lunch & Dinner  lisinopril, 20 mg, Oral, Q24H  polyethylene glycol, 17 g, Oral, BID        lactated ringers, 9 mL/hr, Last Rate: 600 mL/hr (10/13/21 1627)  sodium chloride, 75 mL/hr, Last Rate: 75 mL/hr (10/14/21 0500)        Assessment/Plan     Principal Problem:    Closed fracture of left distal fibula      Overview: Added automatically from request for surgery [id number]  Active Problems:    Closed fracture of left proximal humerus    Diabetes mellitus (HCC)    Hypertension    BMI 40.0-44.9, adult (HCC)    Hyponatremia    Drug-induced constipation      1.  Closed fracture left distal fibula status post fall, status post ORIF  2.  Closed fracture left proximal humerus status post mechanical fall, status post reverse total shoulder arthroplasty on 10/13/2021.  3.   Diabetes mellitus type 2, hemoglobin A1c 6.60.  4.  Essential hypertension, controlled  5.  Morbid obesity.    Plan:    Patient stable for orthopedic surgery to go to rehab at this point.  Case management working on this.  Monitor blood pressure and monitor blood sugars.  These appear stable.  Lab work in the a.m.  Further recommendations will depend on clinical course.    Gibran Diallo DO  10/14/21  15:24 EDT

## 2021-10-14 NOTE — PLAN OF CARE
Goal Outcome Evaluation:  Plan of Care Reviewed With: patient        Progress: no change  Outcome Summary: Pt remains stable. NWB to LLE and LUE. Skin protection measures in place. Voiding per PW. Sling to LUE and dressing is cdi. Splint to LLE is cdi. Controlling pain with IV and PO pain meds. Requiring 2L O2. Educated on diabetes management. D/C plans for SNF. WCTM.

## 2021-10-14 NOTE — DISCHARGE PLACEMENT REQUEST
"Jessica Barnes (65 y.o. Female)             Date of Birth Social Security Number Address Home Phone MRN    1955  [id number]  [place] [address] [phone] [id number]    Islam Marital Status             Restorationism        Admission Date Admission Type Admitting Provider Attending Provider Department, Room/Bed    10/6/21 Emergency Gibran Diallo DO Hinsberg, Francis J, DO [address], P798/1    Discharge Date Discharge Disposition Discharge Destination                         Attending Provider: Gibran Diallo DO    Allergies: Latex, Penicillins, Sulfa Antibiotics    Isolation: None   Infection: None   Code Status: CPR   Advance Care Planning Activity    Ht: 167.6 cm (65.98\")   Wt: 113 kg (250 lb)    Admission Cmt: None   Principal Problem: Closed fracture of left distal fibula [S82.832A] More...                 Active Insurance as of 10/6/2021     Primary Coverage     Payor Plan Insurance Group Employer/Plan Group    MEDICARE MEDICARE A & B      Payor Plan Address Payor Plan Phone Number Payor Plan Fax Number Effective Dates    [address] [phone]  10/1/2019 - None Entered    [facility] [id number]       Subscriber Name Subscriber Birth Date Member ID       JESSICA BARNES 1955 [id number]           Secondary Coverage     Payor Plan Insurance Group Employer/Plan Group    [facility] SUPP [id number]     Payor Plan Address Payor Plan Phone Number Payor Plan Fax Number Effective Dates    [address]   10/1/2019 - None Entered    [facility] [id number]       Subscriber Name Subscriber Birth Date Member ID       JESSICA BARNES 1955 [id number]                 Emergency Contacts      (Rel.) Home Phone Work Phone Mobile Phone    Mati Barnes (Spouse) -- -- [phone]    Fabiana Espinosa (Daughter) -- -- [phone]          "

## 2021-10-14 NOTE — PLAN OF CARE
"Goal Outcome Evaluation:  Plan of Care Reviewed With: patient        Progress: improving       Pt 64 y/o female POD1 of left ORIF of tibia and left reverse total shoulder. Pt Aox4, VSS on RA. Forgetful at times, says anesthesia makes her \"loopy.\" Non-weight wearing to both shoulder and ankle. Did not work with PT or OT today. Refused turns d0rmigr. Voids in purwick without difficulty, LBM 10/13. Blisters on inner thigh and coccyx. Pt needs SNF, waiting on placement and decision from pt. Will continue to monitor.    Ashley Haines BSN, RN  "

## 2021-10-14 NOTE — PROGRESS NOTES
Orthopedic Progress Note    Subjective     Post-Operative Day: POD #1 left reverse total shoulder arthroplasty for proximal humeral fracture  Systemic or Specific Complaints: Minimal pain in her left shoulder.  I think her block is still in effect.  Objective     Vital signs in last 24 hours:  Temp:  [97.1 °F (36.2 °C)-98.8 °F (37.1 °C)] 97.9 °F (36.6 °C)  Heart Rate:  [82-95] 94  Resp:  [14-18] 18  BP: (105-148)/(48-71) 105/50    General: alert, appears stated age and cooperative   Neurovascular:  Normal radial pulse and capillary refill.  Block and affect making it difficult to fully assess neurologic recovery   Wound: Dressing clean and dry. No evidence of infection.   Range of Motion:  ROM shoulder not tested.     Data Review  CBC:  Results from last 7 days   Lab Units 10/14/21  0606   WBC 10*3/mm3 12.19*   RBC 10*6/mm3 3.45*   HEMOGLOBIN g/dL 9.6*   HEMATOCRIT % 29.0*   PLATELETS 10*3/mm3 271     X-ray shows well fixed cemented fracture stem and reverse shoulder arthroplasty prosthesis.  Assessment/Plan     IMPRESSION:stable     Pain Relief: some relief    PLAN: She will be nonweightbearing on her left upper extremity.  We should be able to get a little bit of a neurologic exam tomorrow if her block is resolved by the morning.  If her pain is well controlled she can be discharged from my perspective as long as her medical condition is stable.  Discharge instructions were put in the chart.    Activity: Stay in sling.      LOS: 6 days     Patrick Ramos MD    Date: 10/14/2021  Time: 17:40 EDT

## 2021-10-15 LAB
BASOPHILS # BLD AUTO: 0.04 10*3/MM3 (ref 0–0.2)
BASOPHILS NFR BLD AUTO: 0.4 % (ref 0–1.5)
DEPRECATED RDW RBC AUTO: 42 FL (ref 37–54)
EOSINOPHIL # BLD AUTO: 0.37 10*3/MM3 (ref 0–0.4)
EOSINOPHIL NFR BLD AUTO: 3.5 % (ref 0.3–6.2)
ERYTHROCYTE [DISTWIDTH] IN BLOOD BY AUTOMATED COUNT: 13.8 % (ref 12.3–15.4)
GLUCOSE BLDC GLUCOMTR-MCNC: 126 MG/DL (ref 70–130)
GLUCOSE BLDC GLUCOMTR-MCNC: 150 MG/DL (ref 70–130)
GLUCOSE BLDC GLUCOMTR-MCNC: 178 MG/DL (ref 70–130)
GLUCOSE BLDC GLUCOMTR-MCNC: 78 MG/DL (ref 70–130)
HCT VFR BLD AUTO: 26.9 % (ref 34–46.6)
HGB BLD-MCNC: 8.8 G/DL (ref 12–15.9)
IMM GRANULOCYTES # BLD AUTO: 0.16 10*3/MM3 (ref 0–0.05)
IMM GRANULOCYTES NFR BLD AUTO: 1.5 % (ref 0–0.5)
LYMPHOCYTES # BLD AUTO: 1.7 10*3/MM3 (ref 0.7–3.1)
LYMPHOCYTES NFR BLD AUTO: 16.2 % (ref 19.6–45.3)
MCH RBC QN AUTO: 27.2 PG (ref 26.6–33)
MCHC RBC AUTO-ENTMCNC: 32.7 G/DL (ref 31.5–35.7)
MCV RBC AUTO: 83 FL (ref 79–97)
MONOCYTES # BLD AUTO: 0.73 10*3/MM3 (ref 0.1–0.9)
MONOCYTES NFR BLD AUTO: 6.9 % (ref 5–12)
NEUTROPHILS NFR BLD AUTO: 7.51 10*3/MM3 (ref 1.7–7)
NEUTROPHILS NFR BLD AUTO: 71.5 % (ref 42.7–76)
NRBC BLD AUTO-RTO: 0 /100 WBC (ref 0–0.2)
PLATELET # BLD AUTO: 256 10*3/MM3 (ref 140–450)
PMV BLD AUTO: 9.5 FL (ref 6–12)
RBC # BLD AUTO: 3.24 10*6/MM3 (ref 3.77–5.28)
WBC # BLD AUTO: 10.51 10*3/MM3 (ref 3.4–10.8)

## 2021-10-15 PROCEDURE — 63710000001 INSULIN LISPRO (HUMAN) PER 5 UNITS: Performed by: ORTHOPAEDIC SURGERY

## 2021-10-15 PROCEDURE — 25010000002 ONDANSETRON PER 1 MG: Performed by: ORTHOPAEDIC SURGERY

## 2021-10-15 PROCEDURE — 97530 THERAPEUTIC ACTIVITIES: CPT

## 2021-10-15 PROCEDURE — 82962 GLUCOSE BLOOD TEST: CPT

## 2021-10-15 PROCEDURE — 97166 OT EVAL MOD COMPLEX 45 MIN: CPT

## 2021-10-15 PROCEDURE — 97110 THERAPEUTIC EXERCISES: CPT

## 2021-10-15 PROCEDURE — 63710000001 INSULIN GLARGINE PER 5 UNITS: Performed by: ORTHOPAEDIC SURGERY

## 2021-10-15 PROCEDURE — 85025 COMPLETE CBC W/AUTO DIFF WBC: CPT | Performed by: INTERNAL MEDICINE

## 2021-10-15 PROCEDURE — 97162 PT EVAL MOD COMPLEX 30 MIN: CPT

## 2021-10-15 RX ADMIN — ONDANSETRON 4 MG: 2 INJECTION INTRAMUSCULAR; INTRAVENOUS at 09:26

## 2021-10-15 RX ADMIN — ASPIRIN 325 MG: 325 TABLET ORAL at 09:14

## 2021-10-15 RX ADMIN — INSULIN GLARGINE 100 UNITS: 100 INJECTION, SOLUTION SUBCUTANEOUS at 22:22

## 2021-10-15 RX ADMIN — INSULIN LISPRO 2 UNITS: 100 INJECTION, SOLUTION INTRAVENOUS; SUBCUTANEOUS at 09:14

## 2021-10-15 RX ADMIN — ATORVASTATIN CALCIUM 10 MG: 20 TABLET, FILM COATED ORAL at 20:09

## 2021-10-15 RX ADMIN — ASPIRIN 325 MG: 325 TABLET ORAL at 20:09

## 2021-10-15 RX ADMIN — INSULIN LISPRO 15 UNITS: 100 INJECTION, SOLUTION INTRAVENOUS; SUBCUTANEOUS at 18:24

## 2021-10-15 RX ADMIN — Medication 1000 UNITS: at 09:14

## 2021-10-15 RX ADMIN — OXYCODONE AND ACETAMINOPHEN 2 TABLET: 7.5; 325 TABLET ORAL at 18:24

## 2021-10-15 RX ADMIN — OXYCODONE AND ACETAMINOPHEN 2 TABLET: 7.5; 325 TABLET ORAL at 14:27

## 2021-10-15 RX ADMIN — LISINOPRIL 20 MG: 20 TABLET ORAL at 09:14

## 2021-10-15 RX ADMIN — AMLODIPINE BESYLATE 10 MG: 10 TABLET ORAL at 09:14

## 2021-10-15 RX ADMIN — ASPIRIN 325 MG: 325 TABLET ORAL at 00:37

## 2021-10-15 RX ADMIN — POLYETHYLENE GLYCOL 3350 17 G: 17 POWDER, FOR SOLUTION ORAL at 09:15

## 2021-10-15 RX ADMIN — POLYETHYLENE GLYCOL 3350 17 G: 17 POWDER, FOR SOLUTION ORAL at 20:09

## 2021-10-15 RX ADMIN — OXYCODONE AND ACETAMINOPHEN 2 TABLET: 7.5; 325 TABLET ORAL at 09:14

## 2021-10-15 RX ADMIN — INSULIN LISPRO 15 UNITS: 100 INJECTION, SOLUTION INTRAVENOUS; SUBCUTANEOUS at 12:26

## 2021-10-15 RX ADMIN — OXYCODONE AND ACETAMINOPHEN 2 TABLET: 7.5; 325 TABLET ORAL at 22:26

## 2021-10-15 NOTE — THERAPY EVALUATION
Patient Name: Jessica Barnes  : 1955    MRN: [id number]                              Today's Date: 10/15/2021       Admit Date: 10/6/2021    Visit Dx:     ICD-10-CM ICD-9-CM   1. Other closed nondisplaced fracture of proximal end of left humerus, initial encounter  S42.295A 812.09   2. Other closed fracture of distal end of left fibula, initial encounter  S82.832A 824.8     Patient Active Problem List   Diagnosis   • Small bowel obstruction (HCC)   • Closed fracture of left proximal humerus   • Closed fracture of left distal fibula   • Diabetes mellitus (HCC)   • Hypertension   • BMI 40.0-44.9, adult (HCC)   • Hyponatremia   • Drug-induced constipation     Past Medical History:   Diagnosis Date   • Cataract    • Diabetes mellitus (HCC)    • Hyperlipemia    • Hypertension    • Osteoarthritis of knee, unilateral    • PONV (postoperative nausea and vomiting)    • Torn ligament     RIGHT ANKLE     Past Surgical History:   Procedure Laterality Date   •  SECTION     • CHOLECYSTECTOMY     • EXPLORATORY LAPAROTOMY N/A 2021    Procedure: LAPAROTOMY EXPLORATORY FOR BOWEL OBSTRUCTION,LYSIS OF ADHESIONS,PRIMARY VENTRAL HERNIA REPAIR;  Surgeon: Alie Addison MD;  Location: [facility];  Service: General;  Laterality: N/A;   • KNEE ARTHROSCOPY        General Information     Row Name 10/15/21 1323          OT Time and Intention    Document Type evaluation  -MW     Mode of Treatment individual therapy; occupational therapy  -MW     Row Name 10/15/21 1323          General Information    Patient Profile Reviewed yes  -MW     Prior Level of Function independent:; ADL's; all household mobility; community mobility  -MW     Existing Precautions/Restrictions fall; left; shoulder; non-weight bearing  -MW     Barriers to Rehab medically complex  -MW     Row Name 10/15/21 1323          Living Environment    Lives With spouse  -MW     Row Name 10/15/21 1323          Home Main Entrance    Number of Stairs, Main  "Entrance four  -MW     Row Name 10/15/21 1323          Cognition    Orientation Status (Cognition) oriented x 3  -MW     Row Name 10/15/21 1323          Safety Issues, Functional Mobility    Impairments Affecting Function (Mobility) pain; strength; range of motion (ROM); endurance/activity tolerance  -           User Key  (r) = Recorded By, (t) = Taken By, (c) = Cosigned By    Initials Name Provider Type     Diana Wynne OT Occupational Therapist                 Mobility/ADL's     Row Name 10/15/21 1324          Bed Mobility    Comment (Bed Mobility) Patient declined OOB activity and coming to EOB, as patient stating \"I just got to EOB with PT and I would like the nurse aide to come give me my bed bath so I can go to sleep.\"  -[facility] Name 10/15/21 1324          Transfers    Sit-Stand [place] (Transfers) unable to assess; not tested  -[facility] Name 10/15/21 1324          Activities of Daily Living    BADL Assessment/Intervention --  Patient declined sponge bath after therapist offering to complete with pt; preferred the aide would complete it all for her  -     Row Name 10/15/21 1324          Mobility    Extremity Weight-bearing Status left upper extremity; left lower extremity  -MW     Left Upper Extremity (Weight-bearing Status) non weight-bearing (NWB)  -MW     Left Lower Extremity (Weight-bearing Status) non weight-bearing (NWB)  -           User Key  (r) = Recorded By, (t) = Taken By, (c) = Cosigned By    Initials Name Provider Type     Diana Wynne, OT Occupational Therapist               Obj/Interventions     Row Name 10/15/21 1327          Sensory Assessment (Somatosensory)    Sensory Assessment (Somatosensory) left LE  -MW     Left LE Sensory Assessment unable/difficult to assess  due to partial nerve block still in place  -     Row Name 10/15/21 1327          Vision Assessment/Intervention    Visual Impairment/Limitations WFL  -     Row Name 10/15/21 1327          Range " of Motion Comprehensive    Comment, General Range of Motion LUE and LLE limited post op; unable to assess  -     Row Name 10/15/21 1327          Strength Comprehensive (MMT)    Comment, General Manual Muscle Testing (MMT) Assessment LUE/LLE unable to assess due to NWM precautions in place  -     Row Name 10/15/21 1327          Elbow/Forearm (Therapeutic Exercise)    Elbow/Forearm (Therapeutic Exercise) AROM (active range of motion)  -     Elbow/Forearm AROM (Therapeutic Exercise) right; extension; flexion; supination; pronation; 2 sets; 10 repetitions  -     Row Name 10/15/21 1327          Wrist (Therapeutic Exercise)    Wrist (Therapeutic Exercise) AROM (active range of motion)  -     Wrist AROM (Therapeutic Exercise) right; flexion; extension; 2 sets; 10 repetitions  -     Row Name 10/15/21 1327          Hand (Therapeutic Exercise)    Hand (Therapeutic Exercise) AROM (active range of motion)  -     Hand AROM/AAROM (Therapeutic Exercise) right; AROM (active range of motion); finger flexion; finger extension; 10 repetitions; 2 sets  -     Row Name 10/15/21 1327          Balance    Static Sitting Balance not tested  -     Dynamic Sitting Balance not tested  -     Comment, Balance unable to fully assess due to patient declining EOB activity on this date  -     Row Name 10/15/21 1327          Therapeutic Exercise    Therapeutic Exercise elbow/forearm; wrist; hand  -           User Key  (r) = Recorded By, (t) = Taken By, (c) = Cosigned By    Initials Name Provider Type     Diana Wynne OT Occupational Therapist               Goals/Plan     Row Name 10/15/21 6061          Bathing Goal 1 (OT)    Activity/Device (Bathing Goal 1, OT) bathing skills, all; upper body bathing; lower body bathing  with RUE use only  -     [place] Level/Cues Needed (Bathing Goal 1, OT) minimum assist (75% or more patient effort)  -     Time Frame (Bathing Goal 1, OT) short term goal (STG); 2 weeks  -      Progress/Outcomes (Bathing Goal 1, OT) goal ongoing  -MW     Row Name 10/15/21 1676          Dressing Goal 1 (OT)    Activity/Device (Dressing Goal 1, OT) upper body dressing  -MW     [place]/Cues Needed (Dressing Goal 1, OT) minimum assist (75% or more patient effort)  -MW     Time Frame (Dressing Goal 1, OT) short term goal (STG); 2 weeks  -MW     Progress/Outcome (Dressing Goal 1, OT) goal ongoing  -MW     Row Name 10/15/21 9412          Grooming Goal 1 (OT)    Activity/Device (Grooming Goal 1, OT) grooming skills, all  -MW     [place] (Grooming Goal 1, OT) standby assist  -MW     Time Frame (Grooming Goal 1, OT) 2 weeks; short term goal (STG)  -MW     Progress/Outcome (Grooming Goal 1, OT) goal ongoing  -MW     Row Name 10/15/21 7572          ROM Goal 1 (OT)    ROM Goal 1 (OT) Patient to adhere and perform HEP with mod (I) upon d/c.  -MW     Time Frame (ROM Goal 1, OT) short term goal (STG); 2 weeks  -MW     Progress/Outcome (ROM Goal 1, OT) goal ongoing  -MW           User Key  (r) = Recorded By, (t) = Taken By, (c) = Cosigned By    Initials Name Provider Type    Diana Stevens OT Occupational Therapist               Clinical Impression     Row Name 10/15/21 6969          Pain Assessment    Additional Documentation Pain Scale: Numbers Pre/Post-Treatment (Group)  -MW     Row Name 10/15/21 4832          Pain Scale: Numbers Pre/Post-Treatment    Pretreatment Pain Rating 6/10  -MW     Posttreatment Pain Rating 6/10  -MW     Pain Location - Side Left  -MW     Pain Location shoulder; foot; knee  -MW     Row Name 10/15/21 8146          Plan of Care Review    Progress improving  -MW     Outcome Summary Pt is a 64 y/o female presenting this date POD 2 s/p L reverse TSA and ORIF to LLE. Pt acquired injury from a fall from slippery stairs. Pt report living at home with her  and was (I) w/ ADLs and mobility prior to incident. Patient seen for OT eval this date, A&Ox3, presenting with increased  "pain, decreased activity tolerance, decreased strength, and deficits in all aspects of self care and functional transfers. Patient declined EOB activity this date, stating she did not want to move and \"just get her bed bath from the nurse aide so I can sleep.\" Therapist offered to complete sponge bath with patient, however patient declining. Patient educated on HEP to perform in LUE once nerve block fully worn off. Patient completed RUE AROM within elbow, wrist and hand planes to ensure approp technique completed with each exercise for proper carryover with LUE once able. Patient completed 2 sets x 10 reps in RUE in supine with HOB elevated. Patient to be limited with mobility due to NWB status in LUE and LLE. Patient to benefit from SNF at d/c due to noted functional deficits present related to NWB status on L side. OT to continue to address patient's (I) with ADLs to maximize potential during acute stay.  -     Row Name 10/15/21 1329          Therapy Assessment/Plan (OT)    Rehab Potential (OT) good, to achieve stated therapy goals  -     Criteria for Skilled Therapeutic Interventions Met (OT) yes; meets criteria; skilled treatment is necessary  -     Therapy Frequency (OT) 5 times/wk  -     Row Name 10/15/21 1329          Therapy Plan Review/Discharge Plan (OT)    Anticipated Discharge Disposition (OT) skilled nursing facility  -     Row Name 10/15/21 1329          Vital Signs    O2 Delivery Pre Treatment room air  -MW     O2 Delivery Intra Treatment room air  -MW     O2 Delivery Post Treatment room air  -MW     Pre Patient Position Supine  -MW     Intra Patient Position Supine  -MW     Post Patient Position Supine  -MW     Row Name 10/15/21 1329          Positioning and Restraints    Pre-Treatment Position in bed  -MW     Post Treatment Position bed  -MW     In Bed notified nsg; call light within reach; encouraged to call for assist; exit alarm on; supine  -MW           User Key  (r) = Recorded By, (t) " = Taken By, (c) = Cosigned By    Initials Name Provider Type    MW Diana Wynne, OT Occupational Therapist               Outcome Measures     Row Name 10/15/21 1336          How much help from another is currently needed...    Putting on and taking off regular lower body clothing? 1  -MW     Bathing (including washing, rinsing, and drying) 1  -MW     Toileting (which includes using toilet bed pan or urinal) 1  -MW     Putting on and taking off regular upper body clothing 1  -MW     Taking care of personal grooming (such as brushing teeth) 2  -MW     Eating meals 4  -MW     AM-PAC 6 Clicks Score (OT) 10  -MW     Row Name 10/15/21 0935          How much help from another person do you currently need...    Turning from your back to your side while in flat bed without using bedrails? 3  -AR (r) CB (t) AR (c)     Moving from lying on back to sitting on the side of a flat bed without bedrails? 3  -AR (r) CB (t) AR (c)     Moving to and from a bed to a chair (including a wheelchair)? 2  -AR (r) CB (t) AR (c)     Standing up from a chair using your arms (e.g., wheelchair, bedside chair)? 1  -AR (r) CB (t) AR (c)     Climbing 3-5 steps with a railing? 1  -AR (r) CB (t) AR (c)     To walk in hospital room? 1  -AR (r) CB (t) AR (c)     AM-PAC 6 Clicks Score (PT) 11  -AR (r) CB (t)     Row Name 10/15/21 1336 10/15/21 0935       Functional Assessment    Outcome Measure Options AM-PAC 6 Clicks Daily Activity (OT)  -MW AM-PAC 6 Clicks Basic Mobility (PT)  -AR (r) CB (t) AR (c)          User Key  (r) = Recorded By, (t) = Taken By, (c) = Cosigned By    Initials Name Provider Type    Suzanne Zamudio, PT Physical Therapist    Diony Rubio, PT Student PT Student    MW Diana Wynne, OT Occupational Therapist                Occupational Therapy Education                 Title: PT OT SLP Therapies (Done)     Topic: Occupational Therapy (Done)     Point: ADL training (Done)     Description:   Instruct learner(s) on proper  safety adaptation and remediation techniques during self care or transfers.   Instruct in proper use of assistive devices.              Learning Progress Summary           Patient Acceptance, E, VU by  at 10/15/2021 1337    Comment: role of OT, therapy goals, d/c rec, shoulder protocol and precautions, HEP, adaptive dressing techniques.                   Point: Home exercise program (Done)     Description:   Instruct learner(s) on appropriate technique for monitoring, assisting and/or progressing therapeutic exercises/activities.              Learning Progress Summary           Patient Acceptance, E, VU by  at 10/15/2021 1337    Comment: role of OT, therapy goals, d/c rec, shoulder protocol and precautions, HEP, adaptive dressing techniques.                   Point: Precautions (Done)     Description:   Instruct learner(s) on prescribed precautions during self-care and functional transfers.              Learning Progress Summary           Patient Acceptance, E, VU by  at 10/15/2021 1337    Comment: role of OT, therapy goals, d/c rec, shoulder protocol and precautions, HEP, adaptive dressing techniques.                   Point: Body mechanics (Done)     Description:   Instruct learner(s) on proper positioning and spine alignment during self-care, functional mobility activities and/or exercises.              Learning Progress Summary           Patient Acceptance, E, VU by  at 10/15/2021 1337    Comment: role of OT, therapy goals, d/c rec, shoulder protocol and precautions, HEP, adaptive dressing techniques.                               User Key     Initials Effective Dates Name Provider Type Discipline     08/20/21 -  Diana Wynne OT Occupational Therapist OT              OT Recommendation and Plan  Therapy Frequency (OT): 5 times/wk  Plan of Care Review  Progress: improving  Outcome Summary: Pt is a 66 y/o female presenting this date POD 2 s/p L reverse TSA and ORIF to LLE. Pt acquired injury from a  "fall from slippery stairs. Pt report living at home with her  and was (I) w/ ADLs and mobility prior to incident. Patient seen for OT eval this date, A&Ox3, presenting with increased pain, decreased activity tolerance, decreased strength, and deficits in all aspects of self care and functional transfers. Patient declined EOB activity this date, stating she did not want to move and \"just get her bed bath from the nurse aide so I can sleep.\" Therapist offered to complete sponge bath with patient, however patient declining. Patient educated on HEP to perform in LUE once nerve block fully worn off. Patient completed RUE AROM within elbow, wrist and hand planes to ensure approp technique completed with each exercise for proper carryover with LUE once able. Patient completed 2 sets x 10 reps in RUE in supine with HOB elevated. Patient to be limited with mobility due to NWB status in LUE and LLE. Patient to benefit from SNF at d/c due to noted functional deficits present related to NWB status on L side. OT to continue to address patient's (I) with ADLs to maximize potential during acute stay.     Time Calculation:    Time Calculation- OT     Row Name 10/15/21 1339             Time Calculation- OT    OT Start Time 1016  -MW      OT Stop Time 1033  -MW      OT Time Calculation (min) 17 min  -MW      Total Timed Code Minutes- OT 10 minute(s)  -MW      OT Received On 10/15/21  -MW      OT - Next Appointment 10/18/21  -MW      OT Goal Re-Cert Due Date 10/29/21  -MW              Timed Charges    26998 - OT Therapeutic Exercise Minutes 10  -MW              Untimed Charges    OT Eval/Re-eval Minutes 7  -MW              Total Minutes    Timed Charges Total Minutes 10  -MW      Untimed Charges Total Minutes 7  -MW       Total Minutes 17  -MW            User Key  (r) = Recorded By, (t) = Taken By, (c) = Cosigned By    Initials Name Provider Type    Diana Stevens OT Occupational Therapist              Therapy Charges " for Today     Code Description Service Date Service Provider Modifiers Qty    [id number]  OT THER PROC EA 15 MIN 10/15/2021 Diana Wynne OT GO 1    [id number]  OT EVAL MOD COMPLEXITY 2 10/15/2021 Diana Wynne OT GO 1               Diana Wynne OT  10/15/2021

## 2021-10-15 NOTE — PROGRESS NOTES
Continued Stay Note  [facility]     Patient Name: Jessica Barnes  MRN: [id number]  Today's Date: 10/15/2021    Admit Date: 10/6/2021     Discharge Plan     Row Name 10/15/21 0810       Plan    Plan PENDING (Referral to [facility])    Plan Comments Msg sent to Yareli with [facility] regarding bed availability.               Discharge Codes    No documentation.                     Darcie Spencer RN

## 2021-10-15 NOTE — PLAN OF CARE
Goal Outcome Evaluation:  Plan of Care Reviewed With: patient           Outcome Summary: left shoulder and left ankle dressing CDI.  Percocet given.  ice packs applied to left shoulder.  sling to EDGAR and splint to LLE.  Purewick in place.  may d/c from orthopedic standpoint once rehab palcement is available and medically stable

## 2021-10-15 NOTE — PROGRESS NOTES
Continued Stay Note  [facility]     Patient Name: Jessica Barnes  MRN: [id number]  Today's Date: 10/15/2021    Admit Date: 10/6/2021     Discharge Plan     Row Name 10/15/21 1244       Plan    Plan PENDING    Plan Comments Patient also requested a referral to [facility]. Referral placed in EPIC. Informed Yareli with [facility] of referral    Row Name 10/15/21 0158       Plan    Plan PENDING    Plan Comments Met with patient to inform of no beds available at [facility]. Requested a referral for [facility]. Referral placed in EPIC. Informed Fabiana with [facility] of referral.               Discharge Codes    No documentation.                     Darcie Spencer, RN

## 2021-10-15 NOTE — PROGRESS NOTES
Orthopedic Progress Note    Subjective     Post-Operative Day: POD #1  Systemic or Specific Complaints: No Complaints. Pain is under control with PO meds. Effects of block are resolving.     Objective     Vital signs in last 24 hours:  Temp:  [97.4 °F (36.3 °C)-99.1 °F (37.3 °C)] 99.1 °F (37.3 °C)  Heart Rate:  [86-94] 94  Resp:  [16-18] 18  BP: ()/(50-60) 124/52    General: alert, appears stated age and cooperative   Neurovascular: Radial nerve: Intact, Ulnar nerve: Intact and Median nerve: Intact  Radial pulse: 2+.   Wound: Dressing clean and dry. No evidence of infection.   Range of Motion: Hand/Wrist/elbow ROM WNL. ROM shoulder not tested.     Data Review  CBC:  Results from last 7 days   Lab Units 10/14/21  0606   WBC 10*3/mm3 12.19*   RBC 10*6/mm3 3.45*   HEMOGLOBIN g/dL 9.6*   HEMATOCRIT % 29.0*   PLATELETS 10*3/mm3 271       Assessment/Plan     IMPRESSION:stable     Pain Relief: some relief    PLAN: OK to D/C to rehab from shoulder standpoint. Patient to stay in sling and NWB left UE. OK to do pendulum exercises, wrist and elbow ROM.  Follow up in office 10-14 days.     Activity: Stay in sling.      LOS: 7 days     Patrick Ramos MD    Date: 10/15/2021  Time: 07:47 EDT

## 2021-10-15 NOTE — PLAN OF CARE
"Goal Outcome Evaluation:  Discussed with patient.   Progress: improving  Outcome Summary: Pt is a 64 y/o female presenting this date POD 2 s/p L reverse TSA and ORIF to LLE. Pt acquired injury from a fall from slippery stairs. Pt report living at home with her  and was (I) w/ ADLs and mobility prior to incident. Patient seen for OT eval this date, A&Ox3, presenting with increased pain, decreased activity tolerance, decreased strength, and deficits in all aspects of self care and functional transfers. Patient declined EOB activity this date, stating she did not want to move and \"just get her bed bath from the nurse aide so I can sleep.\" Therapist offered to complete sponge bath with patient, however patient declining. Patient educated on HEP to perform in LUE once nerve block fully worn off. Patient completed RUE AROM within elbow, wrist and hand planes to ensure approp technique completed with each exercise for proper carryover with LUE once able. Patient completed 2 sets x 10 reps in RUE in supine with HOB elevated. Patient to be limited with mobility due to NWB status in LUE and LLE. Patient to benefit from SNF at d/c due to noted functional deficits present related to NWB status on L side. OT to continue to address patient's (I) with ADLs to maximize potential during acute stay.    Therapist wearing mask and eye protection. Patient not wearing mask. Thorough hand hygiene completed before and after tx session.   "

## 2021-10-15 NOTE — PLAN OF CARE
Goal Outcome Evaluation:  Plan of Care Reviewed With: (P) patient        Progress: (P) improving  Outcome Summary: (P) Pt is a 64 y/o female presenting this date POD 2 s/p L reverse TSA and ORIF to LLE. Pt acquired injury from a fall from slippery stairs. Pt report living at home with her  and was independent with ADLs and mobility prior to incident. Today, pt presents with increased pain, decreased activity tolerance, and deficits to functional mobility. Pt performed bed mobility with min A to sit EOB and required increased time and cueing to perform. She returned to supine with min/mod A x2, needing more assistance managing BLE and shoulder. Pt limited with mobility at this time with NWB status of both LUE and LLE. Pt will likely benefit from SNU upon DC due to increased needs related to WB status. PT will continue to address pt's mobility deficits.

## 2021-10-15 NOTE — PROGRESS NOTES
Continued Stay Note  [facility]     Patient Name: Jessica Barnes  MRN: [id number]  Today's Date: 10/15/2021    Admit Date: 10/6/2021     Discharge Plan     Row Name 10/15/21 1138       Plan    Plan PENDING    Plan Comments Met with patient to inform of no beds available at [facility]. Requested a referral for [facility]. Referral placed in EPIC. Informed Fabiana with [facility] of referral.               Discharge Codes    No documentation.                     Darcie Spencer RN

## 2021-10-15 NOTE — PROGRESS NOTES
Continued Stay Note  [facility]     Patient Name: Jessica Barnes  MRN: [id number]  Today's Date: 10/15/2021    Admit Date: 10/6/2021     Discharge Plan     Row Name 10/15/21 1501       Plan    Plan [facility]. Bed available on 10/16    Plan Comments Per Yareli with [facility], bed will be open on 10/16. Spoke to patient to inform. This was patient's first choice. Patient stated transportation will need to be scheduled. Informed Fabiana Centeno that patient will be going to [place]. Informed MD. Packet in cubby    Row Name 10/15/21 1400       Plan    Plan PENDING    Plan Comments Per Yareli with [facility], no beds available at [facility].    Row Name 10/15/21 1244       Plan    Plan PENDING    Plan Comments Patient also requested a referral to [facility]. Referral placed in EPIC. Informed Yareli with [facility] of referral               Discharge Codes    No documentation.                     Darcie Spencer RN

## 2021-10-15 NOTE — PROGRESS NOTES
Continued Stay Note  [facility]     Patient Name: Jessica Barnes  MRN: [id number]  Today's Date: 10/15/2021    Admit Date: 10/6/2021     Discharge Plan     Row Name 10/15/21 0821       Plan    Plan PENDING    Plan Comments Per Yareli with [facility], no beds available today at [facility].    Row Name 10/15/21 0810       Plan    Plan PENDING (Referral to [facility])    Plan Comments Msg sent to Yareli with [facility] regarding bed availability.               Discharge Codes    No documentation.                     Dracie Spencer RN

## 2021-10-15 NOTE — PROGRESS NOTES
Continued Stay Note  [facility]     Patient Name: Jessica Barnes  MRN: [id number]  Today's Date: 10/15/2021    Admit Date: 10/6/2021     Discharge Plan     Row Name 10/15/21 1400       Plan    Plan PENDING    Plan Comments Per Yareli with [facility], no beds available at [facility].    Row Name 10/15/21 1244       Plan    Plan PENDING    Plan Comments Patient also requested a referral to [facility]. Referral placed in EPIC. Informed Yareli with [facility] of referral    Row Name 10/15/21 1138       Plan    Plan PENDING    Plan Comments Met with patient to inform of no beds available at [facility]. Requested a referral for [facility]. Referral placed in EPIC. Informed Fabiana Centeno of referral.               Discharge Codes    No documentation.                     Darcie Spencer, RN

## 2021-10-15 NOTE — THERAPY EVALUATION
Patient Name: Jessica Barnes  : 1955    MRN: [id number]                              Today's Date: 10/15/2021       Admit Date: 10/6/2021    Visit Dx:     ICD-10-CM ICD-9-CM   1. Other closed nondisplaced fracture of proximal end of left humerus, initial encounter  S42.295A 812.09   2. Other closed fracture of distal end of left fibula, initial encounter  S82.832A 824.8     Patient Active Problem List   Diagnosis   • Small bowel obstruction (HCC)   • Closed fracture of left proximal humerus   • Closed fracture of left distal fibula   • Diabetes mellitus (HCC)   • Hypertension   • BMI 40.0-44.9, adult (HCC)   • Hyponatremia   • Drug-induced constipation     Past Medical History:   Diagnosis Date   • Cataract    • Diabetes mellitus (HCC)    • Hyperlipemia    • Hypertension    • Osteoarthritis of knee, unilateral    • PONV (postoperative nausea and vomiting)    • Torn ligament     RIGHT ANKLE     Past Surgical History:   Procedure Laterality Date   •  SECTION     • CHOLECYSTECTOMY     • EXPLORATORY LAPAROTOMY N/A 2021    Procedure: LAPAROTOMY EXPLORATORY FOR BOWEL OBSTRUCTION,LYSIS OF ADHESIONS,PRIMARY VENTRAL HERNIA REPAIR;  Surgeon: Alie Addison MD;  Location: [facility];  Service: General;  Laterality: N/A;   • KNEE ARTHROSCOPY        General Information     Row Name 10/15/21 0921          Physical Therapy Time and Intention    Document Type evaluation (P)   -CB     Mode of Treatment physical therapy (P)   -CB     Row Name 10/15/21 0921          General Information    Patient Profile Reviewed yes (P)   -CB     Prior Level of Function independent:; all household mobility; community mobility (P)   -CB     Existing Precautions/Restrictions fall; left; shoulder; non-weight bearing (P)   NWB of LLE and LUE  -CB     Barriers to Rehab medically complex (P)   -CB     Row Name 10/15/21 0921          Living Environment    Lives With spouse (P)   -CB     Row Name 10/15/21 0921          Home Main  Entrance    Number of Stairs, Main Entrance four (P)   -CB     Row Name 10/15/21 0921          Cognition    Orientation Status (Cognition) oriented x 3 (P)   -CB     Row Name 10/15/21 0921          Safety Issues, Functional Mobility    Impairments Affecting Function (Mobility) pain; strength; range of motion (ROM); endurance/activity tolerance (P)   -CB           User Key  (r) = Recorded By, (t) = Taken By, (c) = Cosigned By    Initials Name Provider Type    Diony Rubio, PT Student PT Student               Mobility     Row Name 10/15/21 0923          Bed Mobility    Bed Mobility supine-sit; sit-supine (P)   -CB     Supine-Sit [place] (Bed Mobility) verbal cues; nonverbal cues (demo/gesture); minimum assist (75% patient effort) (P)   -CB     Sit-Supine [place] (Bed Mobility) verbal cues; nonverbal cues (demo/gesture); minimum assist (75% patient effort); moderate assist (50% patient effort); 2 person assist (P)   -CB     Assistive Device (Bed Mobility) head of bed elevated; draw sheet; bed rails (P)   -CB     Comment (Bed Mobility) increased time to perform due to pain and anxious of moving, constant cueing, more assistance needed returning to supine to manage BLE and L shoulder (P)   -CB     Row Name 10/15/21 0923          Transfers    Comment (Transfers) unable to stand at this time (P)   -CB     Row Name 10/15/21 0923          Sit-Stand Transfer    Sit-Stand [place] (Transfers) not tested; unable to assess (P)   -CB     Row Name 10/15/21 0923          Gait/Stairs (Locomotion)    [place] Level (Gait) not tested (P)   -CB     Comment (Gait/Stairs) unable to stand at this time (P)   -CB     Row Name 10/15/21 0923          Mobility    Extremity Weight-bearing Status left upper extremity; left lower extremity (P)   -CB     Left Upper Extremity (Weight-bearing Status) non weight-bearing (NWB) (P)   NWB of LUE s/p L TSA  -CB     Left Lower Extremity (Weight-bearing Status) non weight-bearing (NWB)  (P)   NWB s/p ORIF of LLE  -CB           User Key  (r) = Recorded By, (t) = Taken By, (c) = Cosigned By    Initials Name Provider Type    Diony Rubio, PT Student PT Student               Obj/Interventions     Row Name 10/15/21 0926          Range of Motion Comprehensive    Comment, General Range of Motion LUE and LLE limited post op (P)   -CB     Row Name 10/15/21 0926          Strength Comprehensive (MMT)    Comment, General Manual Muscle Testing (MMT) Assessment LUE and LLE unable to test due to NWB status (P)   -CB     Row Name 10/15/21 0926          Motor Skills    Therapeutic Exercise -- (P)   B LAQ, R AP sitting EOB  -CB     Row Name 10/15/21 0926          Balance    Balance Assessment sitting static balance; sitting dynamic balance (P)   -CB     Static Sitting Balance WFL; sitting, edge of bed (P)   -CB     Dynamic Sitting Balance WFL; sitting, edge of bed (P)   -CB           User Key  (r) = Recorded By, (t) = Taken By, (c) = Cosigned By    Initials Name Provider Type    Diony Rubio, PT Student PT Student               Goals/Plan     Row Name 10/15/21 0934          Bed Mobility Goal 1 (PT)    Activity/Assistive Device (Bed Mobility Goal 1, PT) bed mobility activities, all (P)   -CB     [place] Level/Cues Needed (Bed Mobility Goal 1, PT) contact guard assist (P)   -CB     Time Frame (Bed Mobility Goal 1, PT) 1 week (P)   -CB     Row Name 10/15/21 0934          Transfer Goal 1 (PT)    Activity/Assistive Device (Transfer Goal 1, PT) sit-to-stand/stand-to-sit; bed-to-chair/chair-to-bed (P)   -CB     [place] Level/Cues Needed (Transfer Goal 1, PT) minimum assist (75% or more patient effort); 2 person assist (P)   -CB     Time Frame (Transfer Goal 1, PT) 1 week (P)   -CB           User Key  (r) = Recorded By, (t) = Taken By, (c) = Cosigned By    Initials Name Provider Type    Diony Rubio, PT Student PT Student               Clinical Impression     Row Name 10/15/21 0928          Pain     Additional Documentation Pain Scale: Numbers Pre/Post-Treatment (Group) (P)   -CB     Row Name 10/15/21 0928          Pain Scale: Numbers Pre/Post-Treatment    Pretreatment Pain Rating 7/10 (P)   -CB     Posttreatment Pain Rating 7/10 (P)   -CB     Pain Location - Side Left (P)   -CB     Pain Location shoulder; foot; ankle (P)   -CB     Pain Intervention(s) Repositioned; Ambulation/increased activity (P)   -CB     Row Name 10/15/21 0928          Plan of Care Review    Plan of Care Reviewed With patient (P)   -CB     Progress improving (P)   -CB     Outcome Summary Pt is a 66 y/o female presenting this date POD 2 s/p L reverse TSA and ORIF to LLE. Pt acquired injury from a fall from slippery stairs. Pt report living at home with her  and was independent with ADLs and mobility prior to incident. Today, pt presents with increased pain, decreased activity tolerance, and deficits to functional mobility. Pt performed bed mobility with min A to sit EOB and required increased time and cueing to perform. She returned to supine with min/mod A x2, needing more assistance managing BLE and shoulder. Pt limited with mobility at this time with NWB status of both LUE and LLE. Pt will likely benefit from SNU upon DC due to increased needs related to WB status. PT will continue to address pt's mobility deficits. (P)   -CB     Row Name 10/15/21 0928          Therapy Assessment/Plan (PT)    Patient/Family Therapy Goals Statement (PT) get better (P)   -CB     Rehab Potential (PT) good, to achieve stated therapy goals (P)   -CB     Criteria for Skilled Interventions Met (PT) yes (P)   -CB     Row Name 10/15/21 0928          Positioning and Restraints    Pre-Treatment Position in bed (P)   -CB     Post Treatment Position bed (P)   -CB     In Bed notified nsg; supine; call light within reach; encouraged to call for assist; exit alarm on (P)   -CB           User Key  (r) = Recorded By, (t) = Taken By, (c) = Cosigned By    Initials  Name Provider Type    CB Diony Dyer, PT Student PT Student               Outcome Measures     Row Name 10/15/21 0935          How much help from another person do you currently need...    Turning from your back to your side while in flat bed without using bedrails? 3 (P)   -CB     Moving from lying on back to sitting on the side of a flat bed without bedrails? 3 (P)   -CB     Moving to and from a bed to a chair (including a wheelchair)? 2 (P)   -CB     Standing up from a chair using your arms (e.g., wheelchair, bedside chair)? 1 (P)   -CB     Climbing 3-5 steps with a railing? 1 (P)   -CB     To walk in hospital room? 1 (P)   -CB     AM-PAC 6 Clicks Score (PT) 11 (P)   -CB     Row Name 10/15/21 0935          Functional Assessment    Outcome Measure Options AM-PAC 6 Clicks Basic Mobility (PT) (P)   -CB           User Key  (r) = Recorded By, (t) = Taken By, (c) = Cosigned By    Initials Name Provider Type    CB Diony Dyer, PT Student PT Student                             Physical Therapy Education                 Title: PT OT SLP Therapies (Done)     Topic: Physical Therapy (Done)     Point: Mobility training (Done)     Learning Progress Summary           Patient Acceptance, E,TB,D, VU,NR by  at 10/15/2021 0936                   Point: Home exercise program (Done)     Learning Progress Summary           Patient Acceptance, E,TB,D, VU,NR by  at 10/15/2021 0936                   Point: Body mechanics (Done)     Learning Progress Summary           Patient Acceptance, E,TB,D, VU,NR by  at 10/15/2021 0936                   Point: Precautions (Done)     Learning Progress Summary           Patient Acceptance, E,TB,D, VU,NR by  at 10/15/2021 0936                               User Key     Initials Effective Dates Name Provider Type Discipline    CB 08/13/21 -  Diony Dyer, PT Student PT Student PT              PT Recommendation and Plan     Plan of Care Reviewed With: (P) patient  Progress: (P) improving  Outcome  Summary: (P) Pt is a 64 y/o female presenting this date POD 2 s/p L reverse TSA and ORIF to LLE. Pt acquired injury from a fall from slippery stairs. Pt report living at home with her  and was independent with ADLs and mobility prior to incident. Today, pt presents with increased pain, decreased activity tolerance, and deficits to functional mobility. Pt performed bed mobility with min A to sit EOB and required increased time and cueing to perform. She returned to supine with min/mod A x2, needing more assistance managing BLE and shoulder. Pt limited with mobility at this time with NWB status of both LUE and LLE. Pt will likely benefit from SNU upon DC due to increased needs related to WB status. PT will continue to address pt's mobility deficits.     Time Calculation:    PT Charges     Row Name 10/15/21 0937             Time Calculation    Start Time 0835 (P)   -CB      Stop Time 0901 (P)   -CB      Time Calculation (min) 26 min (P)   -CB      PT Received On 10/15/21 (P)   -CB      PT - Next Appointment 10/16/21 (P)   -CB      PT Goal Re-Cert Due Date 10/22/21 (P)   -CB              Time Calculation- PT    Total Timed Code Minutes- PT 16 minute(s) (P)   -CB            User Key  (r) = Recorded By, (t) = Taken By, (c) = Cosigned By    Initials Name Provider Type    CB Diony Dyer, PT Student PT Student              Therapy Charges for Today     Code Description Service Date Service Provider Modifiers Qty    02020790781 HC PT EVAL MOD COMPLEXITY 2 10/15/2021 Diony Dyer, PT Student GP 1    [id number]  PT THERAPEUTIC ACT EA 15 MIN 10/15/2021 Diony Dyer, PT Student GP 1          PT G-Codes  Outcome Measure Options: (P) AM-PAC 6 Clicks Basic Mobility (PT)  AM-PAC 6 Clicks Score (PT): (P) 11    Diony Dyer PT Student  10/15/2021

## 2021-10-15 NOTE — PROGRESS NOTES
[facility]    [facility]    Name:  Jessica Barnes   Age:  65 y.o.  Sex:  female  :  1955  MRN:  [id number]   Visit Number:  [id number]  Admission Date:  10/6/2021  Date Of Service:  10/15/21  Primary Care Physician:  Kadeem Arteaga Jr., MD     LOS: 7 days :  Patient Care Team:  Kadeem Arteaga Jr., MD as PCP - General (Family Medicine):    History taken from:     patient chart    Chief Complaint:      Left shoulder pain.    Subjective     Interval History:     Patient seen and examined today.    Patient has distal fib fracture as well as left humerus fracture.  She has been followed by orthopedic surgery.    She had surgery on 10/13/2021  with ORIF of left bimalleolar ankle fracture and left ankle syndesmosis.  She also had left reverse total shoulder arthroplasty for proximal humerus fracture.      She also has diabetes mellitus type 2 and hypertension.  These are being monitored.  These both appear stable at present.    She currently denies any chest pressure, shortness of breath, nausea, vomiting.  Her pain is under control with current pain med regimen.    Orthopedic surgery has signed off at this point, and okayed the patient for rehab.  Advised the patient again that the goal for her is to go to rehab as soon as possible.  She voiced understanding to this.  Case management is working on placement.      Review of Systems:     All systems were reviewed and negative except for:  Musculoskeletal: positive for  Left arm pain.    Objective     Vital Signs:    Temp:  [97.9 °F (36.6 °C)-99.1 °F (37.3 °C)] 98.2 °F (36.8 °C)  Heart Rate:  [90-94] 91  Resp:  [18-20] 20  BP: ()/(44-60) 109/51    Physical Exam:    General: Alert and oriented x4, obese, mild distress  Heart: Regular rate and rhythm without murmur rub or thrill.  Lungs: Clear to auscultation bilaterally without use of accessory muscles or respiration.  Abdomen: Soft/nontender/nondistended.  No HSM  noted.  MSK: Left arm in sling.  4/5 strength in right upper and lower extremities.     Results Review:      I reviewed the patient's new clinical results.  I reviewed the patient's new imaging results and agree with the interpretation.    Labs:    Lab Results (last 24 hours)     Procedure Component Value Units Date/Time    POC Glucose Once [[id number]]  (Normal) Collected: 10/15/21 1117    Specimen: Blood Updated: 10/15/21 1121     Glucose 126 mg/dL      Comment: Meter: [id number] : [id number] iversity       CBC & Differential [[id number]]  (Abnormal) Collected: 10/15/21 0733    Specimen: Blood Updated: 10/15/21 0805    Narrative:      The following orders were created for panel order CBC & Differential.  Procedure                               Abnormality         Status                     ---------                               -----------         ------                     CBC Auto Differential[[id number]]        Abnormal            Final result                 Please view results for these tests on the individual orders.    CBC Auto Differential [[id number]]  (Abnormal) Collected: 10/15/21 0733    Specimen: Blood Updated: 10/15/21 0805     WBC 10.51 10*3/mm3      RBC 3.24 10*6/mm3      Hemoglobin 8.8 g/dL      Hematocrit 26.9 %      MCV 83.0 fL      MCH 27.2 pg      MCHC 32.7 g/dL      RDW 13.8 %      RDW-SD 42.0 fl      MPV 9.5 fL      Platelets 256 10*3/mm3      Neutrophil % 71.5 %      Lymphocyte % 16.2 %      Monocyte % 6.9 %      Eosinophil % 3.5 %      Basophil % 0.4 %      Immature Grans % 1.5 %      Neutrophils, Absolute 7.51 10*3/mm3      Lymphocytes, Absolute 1.70 10*3/mm3      Monocytes, Absolute 0.73 10*3/mm3      Eosinophils, Absolute 0.37 10*3/mm3      Basophils, Absolute 0.04 10*3/mm3      Immature Grans, Absolute 0.16 10*3/mm3      nRBC 0.0 /100 WBC     POC Glucose Once [[id number]]  (Abnormal) Collected: 10/15/21 0624    Specimen: Blood Updated: 10/15/21 0627     Glucose 150 mg/dL       Comment: Meter: [id number] : [id number] Kenneth SON       POC Glucose Once [[id number]]  (Abnormal) Collected: 10/14/21 2132    Specimen: Blood Updated: 10/14/21 2134     Glucose 166 mg/dL      Comment: Meter: [id number] : [id number] Kenneth Chowdary NA       POC Glucose Once [[id number]]  (Abnormal) Collected: 10/14/21 1530    Specimen: Blood Updated: 10/14/21 1532     Glucose 139 mg/dL      Comment: Meter: [id number] : [id number] [facility]              Radiology:    Imaging Results (Last 24 Hours)     ** No results found for the last 24 hours. **          Medication Review:     amLODIPine, 10 mg, Oral, Q24H  aspirin, 325 mg, Oral, Q12H  atorvastatin, 10 mg, Oral, Daily  cholecalciferol, 1,000 Units, Oral, Daily  droperidol, 0.625 mg, Intravenous, Once  insulin glargine, 100 Units, Subcutaneous, Nightly  insulin lispro, 0-9 Units, Subcutaneous, TID With Meals  insulin lispro, 15 Units, Subcutaneous, Daily With Lunch & Dinner  lisinopril, 20 mg, Oral, Q24H  polyethylene glycol, 17 g, Oral, BID        lactated ringers, 9 mL/hr, Last Rate: 600 mL/hr (10/13/21 1627)        Assessment/Plan     Principal Problem:    Closed fracture of left distal fibula      Overview: Added automatically from request for surgery [id number]  Active Problems:    Closed fracture of left proximal humerus    Diabetes mellitus (HCC)    Hypertension    BMI 40.0-44.9, adult (HCC)    Hyponatremia    Drug-induced constipation      1.  Closed fracture left distal fibula status post fall, status post ORIF  2.  Closed fracture left proximal humerus status post mechanical fall, status post reverse total shoulder arthroplasty on 10/13/2021.  3.  Diabetes mellitus type 2, hemoglobin A1c 6.60.  4.  Essential hypertension, controlled  5.  Morbid obesity.    Plan:    Await rehab.  Case management working on this.  Monitor blood pressure and monitor blood sugars.  These appear stable.    Further recommendations will depend on clinical  course.    Gibran Diallo DO  10/15/21  14:45 EDT

## 2021-10-15 NOTE — PLAN OF CARE
Goal Outcome Evaluation:           Progress: improving  Outcome Summary: VSS. IV saline locked. worked with PT, refused OT today. Started on PO pain meds. reporting left ankle pain, left shoulder still protected by block. pillows in use. purewick in use. plan to d/c to rehab tomorrow. accuchecks ordered. altered insulin dosage due to BG of 76 and lack of appetite. continue non-weight bearing on left.

## 2021-10-16 LAB
GLUCOSE BLDC GLUCOMTR-MCNC: 120 MG/DL (ref 70–130)
GLUCOSE BLDC GLUCOMTR-MCNC: 125 MG/DL (ref 70–130)
GLUCOSE BLDC GLUCOMTR-MCNC: 128 MG/DL (ref 70–130)

## 2021-10-16 PROCEDURE — 63710000001 INSULIN LISPRO (HUMAN) PER 5 UNITS: Performed by: ORTHOPAEDIC SURGERY

## 2021-10-16 PROCEDURE — 25010000002 ONDANSETRON PER 1 MG: Performed by: ORTHOPAEDIC SURGERY

## 2021-10-16 PROCEDURE — 63710000001 INSULIN GLARGINE PER 5 UNITS: Performed by: ORTHOPAEDIC SURGERY

## 2021-10-16 PROCEDURE — 82962 GLUCOSE BLOOD TEST: CPT

## 2021-10-16 PROCEDURE — 97110 THERAPEUTIC EXERCISES: CPT | Performed by: PHYSICAL THERAPIST

## 2021-10-16 RX ORDER — INSULIN GLARGINE 100 [IU]/ML
100 INJECTION, SOLUTION SUBCUTANEOUS NIGHTLY
Refills: 12
Start: 2021-10-16 | End: 2022-07-05

## 2021-10-16 RX ORDER — OXYCODONE AND ACETAMINOPHEN 7.5; 325 MG/1; MG/1
1 TABLET ORAL EVERY 4 HOURS PRN
Qty: 18 TABLET | Refills: 0 | Status: SHIPPED | OUTPATIENT
Start: 2021-10-16 | End: 2021-10-20

## 2021-10-16 RX ORDER — LISINOPRIL 20 MG/1
20 TABLET ORAL
Start: 2021-10-16 | End: 2022-07-05

## 2021-10-16 RX ORDER — POLYETHYLENE GLYCOL 3350 17 G/17G
17 POWDER, FOR SOLUTION ORAL 2 TIMES DAILY
Start: 2021-10-16

## 2021-10-16 RX ORDER — BISACODYL 5 MG/1
10 TABLET, DELAYED RELEASE ORAL DAILY PRN
Start: 2021-10-16 | End: 2022-07-05

## 2021-10-16 RX ORDER — ASPIRIN 325 MG
325 TABLET ORAL EVERY 12 HOURS
Start: 2021-10-16 | End: 2022-07-05

## 2021-10-16 RX ADMIN — INSULIN LISPRO 15 UNITS: 100 INJECTION, SOLUTION INTRAVENOUS; SUBCUTANEOUS at 18:08

## 2021-10-16 RX ADMIN — ONDANSETRON 4 MG: 2 INJECTION INTRAMUSCULAR; INTRAVENOUS at 07:55

## 2021-10-16 RX ADMIN — OXYCODONE AND ACETAMINOPHEN 2 TABLET: 7.5; 325 TABLET ORAL at 21:58

## 2021-10-16 RX ADMIN — ATORVASTATIN CALCIUM 10 MG: 20 TABLET, FILM COATED ORAL at 21:58

## 2021-10-16 RX ADMIN — OXYCODONE AND ACETAMINOPHEN 2 TABLET: 7.5; 325 TABLET ORAL at 15:18

## 2021-10-16 RX ADMIN — ASPIRIN 325 MG: 325 TABLET ORAL at 08:30

## 2021-10-16 RX ADMIN — INSULIN LISPRO 15 UNITS: 100 INJECTION, SOLUTION INTRAVENOUS; SUBCUTANEOUS at 12:42

## 2021-10-16 RX ADMIN — ASPIRIN 325 MG: 325 TABLET ORAL at 22:10

## 2021-10-16 RX ADMIN — AMLODIPINE BESYLATE 10 MG: 10 TABLET ORAL at 10:45

## 2021-10-16 RX ADMIN — LISINOPRIL 20 MG: 20 TABLET ORAL at 10:45

## 2021-10-16 RX ADMIN — POLYETHYLENE GLYCOL 3350 17 G: 17 POWDER, FOR SOLUTION ORAL at 21:58

## 2021-10-16 RX ADMIN — POLYETHYLENE GLYCOL 3350 17 G: 17 POWDER, FOR SOLUTION ORAL at 08:30

## 2021-10-16 RX ADMIN — Medication 1000 UNITS: at 08:30

## 2021-10-16 RX ADMIN — OXYCODONE AND ACETAMINOPHEN 2 TABLET: 7.5; 325 TABLET ORAL at 08:41

## 2021-10-16 RX ADMIN — INSULIN GLARGINE 100 UNITS: 100 INJECTION, SOLUTION SUBCUTANEOUS at 21:59

## 2021-10-16 NOTE — THERAPY TREATMENT NOTE
Patient Name: Jessica Barnes  : 1955    MRN: [id number]                              Today's Date: 10/16/2021       Admit Date: 10/6/2021    Visit Dx:     ICD-10-CM ICD-9-CM   1. Other closed nondisplaced fracture of proximal end of left humerus, initial encounter  S42.295A 812.09   2. Other closed fracture of distal end of left fibula, initial encounter  S82.832A 824.8   3. Closed fracture of proximal end of left humerus, unspecified fracture morphology, initial encounter  S4.A 812.00     Patient Active Problem List   Diagnosis   • Small bowel obstruction (HCC)   • Closed fracture of left proximal humerus   • Closed fracture of left distal fibula   • Diabetes mellitus (HCC)   • Hypertension   • BMI 40.0-44.9, adult (HCC)   • Hyponatremia   • Drug-induced constipation     Past Medical History:   Diagnosis Date   • Cataract    • Diabetes mellitus (HCC)    • Hyperlipemia    • Hypertension    • Osteoarthritis of knee, unilateral    • PONV (postoperative nausea and vomiting)    • Torn ligament     RIGHT ANKLE     Past Surgical History:   Procedure Laterality Date   •  SECTION     • CHOLECYSTECTOMY     • EXPLORATORY LAPAROTOMY N/A 2021    Procedure: LAPAROTOMY EXPLORATORY FOR BOWEL OBSTRUCTION,LYSIS OF ADHESIONS,PRIMARY VENTRAL HERNIA REPAIR;  Surgeon: Alie Addison MD;  Location: [facility];  Service: General;  Laterality: N/A;   • KNEE ARTHROSCOPY        General Information     Row Name 10/16/21 1426          Physical Therapy Time and Intention    Document Type therapy note (daily note)  -GJ     Mode of Treatment individual therapy; physical therapy  -GJ     Row Name 10/16/21 1426          General Information    Patient Profile Reviewed yes  -GJ     Existing Precautions/Restrictions fall; non-weight bearing  -GJ     Barriers to Rehab medically complex; physical barrier  -GJ     Row Name 10/16/21 1426          Cognition    Orientation Status (Cognition) oriented x 3  -GJ     Row Name  10/16/21 1426          Safety Issues, Functional Mobility    Safety Issues Affecting Function (Mobility) insight into deficits/self-awareness; judgment  -GJ     Impairments Affecting Function (Mobility) strength; range of motion (ROM)  -GJ     Comment, Safety Issues/Impairments (Mobility) NWB'ing LUE/LLE  -           User Key  (r) = Recorded By, (t) = Taken By, (c) = Cosigned By    Initials Name Provider Type    Stanton Dhillon, PT Physical Therapist               Mobility     Row Name 10/16/21 1428          Bed Mobility    Bed Mobility supine-sit; sit-supine  -GJ     Supine-Sit [place] (Bed Mobility) verbal cues; set up; nonverbal cues (demo/gesture); minimum assist (75% patient effort); 2 person assist  -GJ     Sit-Supine [place] (Bed Mobility) set up; verbal cues; nonverbal cues (demo/gesture); minimum assist (75% patient effort); 2 person assist  -GJ     Assistive Device (Bed Mobility) bed rails; draw sheet; head of bed elevated  -GJ     Row Name 10/16/21 1428          Bed-Chair Transfer    Bed-Chair [place] (Transfers) other (see comments)  pt refused  -GJ     Row Name 10/16/21 1428          Sit-Stand Transfer    Sit-Stand [place] (Transfers) other (see comments)  pt refused  -GJ     Row Name 10/16/21 1428          Mobility    Extremity Weight-bearing Status left upper extremity; left lower extremity  -GJ     Left Upper Extremity (Weight-bearing Status) non weight-bearing (NWB)  -GJ     Left Lower Extremity (Weight-bearing Status) non weight-bearing (NWB)  -GJ           User Key  (r) = Recorded By, (t) = Taken By, (c) = Cosigned By    Initials Name Provider Type    Stanton Dhillon, PT Physical Therapist               Obj/Interventions     Row Name 10/16/21 1429          Motor Skills    Therapeutic Exercise shoulder; hip; knee; ankle  -     Row Name 10/16/21 1429          Shoulder (Therapeutic Exercise)    Shoulder (Therapeutic Exercise) AROM (active range of motion)  shoulder  shrugs, scap retraction, open/close hand all performed in sitting,no humeral extension  -     Shoulder AROM (Therapeutic Exercise) bilateral; 10 repetitions  -     Row Name 10/16/21 1429          Hip (Therapeutic Exercise)    Hip (Therapeutic Exercise) AROM (active range of motion)  -     Hip AROM (Therapeutic Exercise) bilateral; knee to chest stretch; 10 repetitions; supine  -     Row Name 10/16/21 1429          Knee (Therapeutic Exercise)    Knee (Therapeutic Exercise) AROM (active range of motion)  -     Knee AROM (Therapeutic Exercise) bilateral; sitting; LAQ (long arc quad); 10 repetitions  -     Row Name 10/16/21 1429          Ankle (Therapeutic Exercise)    Ankle (Therapeutic Exercise) AROM (active range of motion)  -     Ankle AROM (Therapeutic Exercise) right; plantarflexion; dorsiflexion; 10 repetitions; sitting  -     Row Name 10/16/21 1429          Balance    Balance Assessment --  sat EOB x 10 min, indpendent  -           User Key  (r) = Recorded By, (t) = Taken By, (c) = Cosigned By    Initials Name Provider Type    Stanton Dhillon, PT Physical Therapist               Goals/Plan    No documentation.                Clinical Impression     Row Name 10/16/21 1432          Pain Scale: Numbers Pre/Post-Treatment    Pain Intervention(s) Medication (See MAR)  -     Row Name 10/16/21 1432          Plan of Care Review    Plan of Care Reviewed With patient  -     Progress improving  -     Row Name 10/16/21 1432          Positioning and Restraints    Pre-Treatment Position in bed  -     Post Treatment Position bed  -     In Bed notified nsg; fowlers; call light within reach; encouraged to call for assist; exit alarm on  -           User Key  (r) = Recorded By, (t) = Taken By, (c) = Cosigned By    Initials Name Provider Type    Stanton Dhillon, PT Physical Therapist               Outcome Measures     Row Name 10/16/21 1434          How much help from another person do you  currently need...    Turning from your back to your side while in flat bed without using bedrails? 3  -GJ     Moving from lying on back to sitting on the side of a flat bed without bedrails? 3  -GJ     Moving to and from a bed to a chair (including a wheelchair)? 1  -GJ     Standing up from a chair using your arms (e.g., wheelchair, bedside chair)? 2  -GJ     Climbing 3-5 steps with a railing? 1  -GJ     To walk in hospital room? 1  -GJ     AM-PAC 6 Clicks Score (PT) 11  -GJ     Row Name 10/16/21 1434          Functional Assessment    Outcome Measure Options AM-PAC 6 Clicks Basic Mobility (PT)  -           User Key  (r) = Recorded By, (t) = Taken By, (c) = Cosigned By    Initials Name Provider Type    Stanton Dhillon, PT Physical Therapist                             Physical Therapy Education                 Title: PT OT SLP Therapies (Done)     Topic: Physical Therapy (Done)     Point: Mobility training (Done)     Learning Progress Summary           Patient Acceptance, E,TB,D, VU,DU,NR by  at 10/16/2021 1425    Comment: issueed HEP ([id number]), discfussed importance of mobility , role PT,    Acceptance, E,TB,D, VU,NR by CB at 10/15/2021 0936                   Point: Home exercise program (Done)     Learning Progress Summary           Patient Acceptance, E,TB,D, VU,DU,NR by  at 10/16/2021 1425    Comment: issueed HEP ([id number]), discfussed importance of mobility , role PT,    Acceptance, E,TB,D, VU,NR by CB at 10/15/2021 0936                   Point: Body mechanics (Done)     Learning Progress Summary           Patient Acceptance, E,TB,D, VU,DU,NR by  at 10/16/2021 1425    Comment: issueed HEP ([id number]), discfussed importance of mobility , role PT,    Acceptance, E,TB,D, VU,NR by CB at 10/15/2021 0936                   Point: Precautions (Done)     Learning Progress Summary           Patient Acceptance, E,TB,D, VU,DU,NR by  at 10/16/2021 1425    Comment: issueed HEP  ([id number], [facility]), discfussed importance of mobility , role PT,    Acceptance, E,TB,D, VU,NR by CB at 10/15/2021 0936                               User Key     Initials Effective Dates Name Provider Type Discipline     06/16/21 -  Stanton Hanks, PT Physical Therapist PT    CB 08/13/21 -  Diony Dyer, TAYLOR Student PT Student PT              PT Recommendation and Plan  Planned Therapy Interventions (PT): balance training, bed mobility training, patient/family education, postural re-education, strengthening, transfer training  Plan of Care Reviewed With: patient  Progress: improving     Time Calculation:    PT Charges     Row Name 10/16/21 1438             Time Calculation    Start Time 1352  -GJ      Stop Time 1418  -GJ      Time Calculation (min) 26 min  -GJ      PT Received On 10/16/21  -GJ      PT - Next Appointment 10/17/21  -GJ              Timed Charges    34714 - PT Therapeutic Exercise Minutes 26  -GJ              Total Minutes    Timed Charges Total Minutes 26  -GJ       Total Minutes 26  -GJ            User Key  (r) = Recorded By, (t) = Taken By, (c) = Cosigned By    Initials Name Provider Type     Stanton Hanks, PT Physical Therapist              Therapy Charges for Today     Code Description Service Date Service Provider Modifiers Qty    16991865504 HC PT THER PROC EA 15 MIN 10/16/2021 Stanton Hanks, PT GP 2          PT G-Codes  Outcome Measure Options: AM-PAC 6 Clicks Basic Mobility (PT)  AM-PAC 6 Clicks Score (PT): 11  AM-PAC 6 Clicks Score (OT): 10    Stanton Hanks PT  10/16/2021

## 2021-10-16 NOTE — PLAN OF CARE
Goal Outcome Evaluation:  Plan of Care Reviewed With: patient        Progress: improving  Outcome Summary: VSS. Monitoring blood sugar.  Purewick in place. Plan to discharge tomorrow to [facility] unable to schedule today, Dr. Diallo made aware. Percocet given for pain. Dressing to left shoulder and ankle, CDI. Bed alarm for safety. IS up to 1750.

## 2021-10-16 NOTE — PROGRESS NOTES
Continued Stay Note  [facility]     Patient Name: Jessica Barnes  MRN: [id number]  Today's Date: 10/16/2021    Admit Date: 10/6/2021     Discharge Plan     Row Name 10/16/21 1141       Plan    Plan Comments [facility] scheduled out until tomorrow AM, patient added to AM list and EMS will call in morning to give exact time. Update to RN, will update MD......Zoila DIANE              Expected Discharge Date and Time     Expected Discharge Date Expected Discharge Time    Oct 16, 2021             Kerline Singh, RN

## 2021-10-16 NOTE — PLAN OF CARE
Goal Outcome Evaluation:  Plan of Care Reviewed With: patient        Progress: improving     Ms. Barnes is POD 3 reverse L TSA, L tib/fib ORIF.  She has BMI 40. She refused attempts to stand today. We worked on bed mobility, LE and UE exercises as able. She is NWB'ing LUE/LLE.  She appeared to require decreased assistance for bed mobility but increased encouragement that she could do it.  HEP was printed and issued to pt. Through [employer], secondary to her concerns about not having exercises to perform. Anticipated DC location SNF vs. Rehab.      Patient was intermittently wearing a face mask during this therapy encounter. Therapist used appropriate personal protective equipment including eye protection, mask, and gloves.  Mask used was standard procedure mask. Appropriate PPE was worn during the entire therapy session. Hand hygiene was completed before and after therapy session. Patient is not in enhanced droplet precautions.

## 2021-10-16 NOTE — PROGRESS NOTES
"Orthopaedic Surgery  Daily Progress Note    /53 (BP Location: Right arm, Patient Position: Lying)   Pulse 83   Temp 97.9 °F (36.6 °C) (Oral)   Resp 18   Ht 167.6 cm (65.98\")   Wt 113 kg (250 lb)   SpO2 90%   BMI 40.37 kg/m²     Lab Results (last 24 hours)     Procedure Component Value Units Date/Time    POC Glucose Once [[id number]]  (Normal) Collected: 10/16/21 0620    Specimen: Blood Updated: 10/16/21 0622     Glucose 120 mg/dL      Comment: Meter: [id number] : [id number] Starr Sylvia C. NA       POC Glucose Once [[id number]]  (Abnormal) Collected: 10/15/21 2210    Specimen: Blood Updated: 10/15/21 2212     Glucose 178 mg/dL      Comment: Meter: [id number] : [id number] Starr Sylvia C. NA       POC Glucose Once [[id number]]  (Normal) Collected: 10/15/21 1634    Specimen: Blood Updated: 10/15/21 1638     Glucose 78 mg/dL      Comment: Meter: [id number] : [id number] Smailagic Berina CNA       POC Glucose Once [[id number]]  (Normal) Collected: 10/15/21 1117    Specimen: Blood Updated: 10/15/21 1121     Glucose 126 mg/dL      Comment: Meter: [id number] : [id number] Giuseppe Brenda PCA             Imaging Results (Last 24 Hours)     ** No results found for the last 24 hours. **          Patient Care Team:  Kadeem Arteaga Jr., MD as PCP - General (Family Medicine)    SUBJECTIVE  Pain controlled.  Tolerating diet without nausea or vomiting.  Voiding per external catheter.  Participating in formal and occupational therapy.  Compliant with strict nonweightbearing to the left lower extremity and left upper extremity in a sling.  Bed available at [facility] for discharge today.    PHYSICAL EXAM  Resting in NAD  Splint clean, dry, intact; ABD surgical dressing to the left upper extremity clean dry and intact.  Sling is present.  Left fingers and toes are warm, perfused, brisk capillary refill  Can flexes/extends left fingers and toes   SILT over left shoulder, arm, fingers, knee, thigh " "toes  Radial pulses palpable             Closed fracture of left distal fibula    Closed fracture of left proximal humerus    Diabetes mellitus (HCC)    Hypertension    BMI 40.0-44.9, adult (HCC)    Hyponatremia    Drug-induced constipation      PLAN / DISPOSITION:  POD 3 s/p left ankle ORIF (Dr. Orta) and left reverse total shoulder (Dr. Ramos), doing well  1. Pain control: Orals  2.  Antibiotics: Postoperative antibiotics are completed  3.  PT: NWB to the left upper and left lower extremity in sling and splint, PT and OT following   4. DVT: ASA 325mg twice daily plus SAIGE/SCDs, mobilization  5. Dispo: Okay to discharge from an orthopedic standpoint when medically cleared, follow up 2-3 weeks with Dr. Orta and Dr. Ramos at [facility] ([phone] to make appointment)    Leave splint in place, clean and dry, until follow up  No weightbearing on operative leg  Elevate as much as possible (\"toes above the nose\")  Try to get up and move around at least once per hour while awake to help minimize risk of blood clots   mg PO BID for 30 days post op    Maritza Boyd, APRN  10/16/21  08:15 EDT      "

## 2021-10-16 NOTE — PROGRESS NOTES
"Physicians Statement of Medical Necessity for  Ambulance Transportation    GENERAL INFORMATION     Name: Jessica Barnes  YOB: 1955  Medicare #: [id number]  Transport Date: 10/16/2021   (Valid for round trips this date, or for scheduled repetitive trips for 60 days from the date signed below.)  Origin[facility] [address] [place] [address]    Destination: [facility], [address] [address]  Is the Patient's stay covered under Medicare Part A (PPS/DRG?)Yes  Closest appropriate facility? Yes  If this a hosp-hosp transfer? No  Is this a hospice patient? No    MEDICAL NECESSITY QUESTIONAIRE    Ambulance Transportation is medically necessary only if other means of transportation are contraindicated or would be potentially harmful to the patient.  To meet this requirement, the patient must be either \"bed confined\" or suffer from a condition such that transport by means other than an ambulance is contraindicated by the patient's condition.  The following questions must be answered by the healthcare professional signing below for this form to be valid:     1) Describe the MEDICAL CONDITION (physical and/or mental) of this patient AT THE TIME OF AMBULANCE TRANSPORT that requires the patient to be transported in an ambulance, and why transport by other means is contraindicated by the patient's condition:   Past Medical History:   Diagnosis Date   • Cataract    • Diabetes mellitus (HCC)    • Hyperlipemia    • Hypertension    • Osteoarthritis of knee, unilateral    • PONV (postoperative nausea and vomiting)    • Torn ligament     RIGHT ANKLE      Past Surgical History:   Procedure Laterality Date   •  SECTION     • CHOLECYSTECTOMY     • EXPLORATORY LAPAROTOMY N/A 2021    Procedure: LAPAROTOMY EXPLORATORY FOR BOWEL OBSTRUCTION,LYSIS OF ADHESIONS,PRIMARY VENTRAL HERNIA REPAIR;  Surgeon: Alie Addison MD;  Location: [facility] OR;  Service: " "General;  Laterality: N/A;   • KNEE ARTHROSCOPY        2) Is this patient \"bed confined\" as defined below?Yes   To be \"bed confined\" the patient must satisfy all three of the following criteria:  (1) unable to get up from bed without assistance; AND (2) unable to ambulate;  AND (3) unable to sit in a chair or wheelchair.  3) Can this patient safely be transported by car or wheelchair van (I.e., may safely sit during transport, without an attendant or monitoring?)No   4. In addition to completing questions 1-3 above, please check any of the following conditions that apply*:          *Note: supporting documentation for any boxes checked must be maintained in the patient's medical records Non-healed fractures and Unable to tolerate seated position for time needed to transport      SIGNATURE OF PHYSICIAN OR OTHER AUTHORIZED HEALTHCARE PROFESSIONAL    I certify that the above information is true and correct based on my evaluation of this patient, and represent that the patient requires transport by ambulance and that other forms of transport are contraindicated.  I understand that this information will be used by the Centers for Medicare and Medicaid Services (CMS) to support the determiniation of medical necessity for ambulance services, and I represent that I have personal knowledge of the patient's condition at the time of transport.       If this box is checked, I also certify that the patient is physically or mentally incapable of signing the ambulance service's claim form and that the institution with which I am affiliated has furnished care, services or assistance to the patient.  My signature below is made on behalf of the patient pursuant to 42 .36(b)(4). In accordance with 42 .37, the specific reason(s) that the patient is physically or mentally incapable of signing the claim for is as follows:     Signature of Physician or Healthcare Professional  Date/Time:        (For Scheduled repetitive " transport, this form is not valid for transports performed more than 60 days after this date).                                                                                                                                            --------------------------------------------------------------------------------------------  Printed Name and Credentials of Physician or Authorized Healthcare Professional     *Form must be signed by patient's attending physician for scheduled, repetitive transports,.  For non-repetitive ambulance transports, if unable to obtain the signature of the attending physician, any of the following may sign (please select below):     Physician  Clinical Nurse Specialist  Registered Nurse     Physician Assistant  Discharge Planner  Licensed Practical Nurse     Nurse Practitioner

## 2021-10-16 NOTE — PLAN OF CARE
Goal Outcome Evaluation:  Plan of Care Reviewed With: patient        Progress: improving  Outcome Summary: VSS. [facility]  right shoulder and left ankle dressing CDI.  Percocet given for pain.  blood sugar monitoring.

## 2021-10-16 NOTE — DISCHARGE SUMMARY
[facility]  [facility]      Name:  Jessica Barnes   Age:  65 y.o.  Sex:  female  :  1955  MRN:  [id number]   Visit Number:  [id number]  Primary Care Physician:  Kadeem Arteaga Jr., MD  Date of Discharge:  10/16/2021  Admission Date:  10/6/2021      Discharge Diagnosis:     1.  Closed fracture left distal fibula status post fall, status post ORIF  2.  Closed fracture left proximal humerus status post mechanical fall, status post reverse total shoulder arthroplasty on 10/13/2021.  3.  Diabetes mellitus type 2, hemoglobin A1c 6.60.  4.  Essential hypertension, controlled  5.  Morbid obesity.  Active Hospital Problems    Diagnosis  POA   • **Closed fracture of left distal fibula [S82.832A]  Unknown   • Hyponatremia [E87.1]  Unknown   • Drug-induced constipation [K59.03]  Unknown   • Diabetes mellitus (HCC) [E11.9]  Unknown   • Hypertension [I10]  Unknown   • BMI 40.0-44.9, adult (HCC) [Z68.41]  Not Applicable   • Closed fracture of left proximal humerus [S42.202A]  Yes      Resolved Hospital Problems   No resolved problems to display.         Presenting Problem/History of Present Illness:    Other closed nondisplaced fracture of proximal end of left humerus, initial encounter [S42.295A]  Other closed fracture of distal end of left fibula, initial encounter [S82.832A]         Hospital Course:    Patient has distal fib fracture as well as left humerus fracture.  She has been followed by orthopedic surgery.    She had surgery on 10/13/2021  with ORIF of left bimalleolar ankle fracture and left ankle syndesmosis.  She also had left reverse total shoulder arthroplasty for proximal humerus fracture.       She also has diabetes mellitus type 2 and hypertension.  These are being monitored.  These both appear stable at present.     She currently denies any chest pressure, shortness of breath, nausea, vomiting.  Her pain is under control with current pain med regimen.     Orthopedic  surgery has signed off at this point, and okayed the patient for rehab.  Vital signs are stable.  Hemoglobin has been stable.  Blood sugars are stable.  Would monitor before every meal and at bedtime and adjust as needed.    Patient stable for discharge.  Follow-up with orthopedic surgery as scheduled.  Call orthopedic surgery office if any questions regarding weightbearing or management of the left ankle and the left shoulder.  Patient otherwise stable for discharge.  Follow-up with PCP when released from rehab.    Procedures Performed:    Procedure(s):  ANKLE OPEN REDUCTION INTERNAL FIXATION       Consults:     Consults     Date and Time Order Name Status Description    10/6/2021  5:15 PM Ortho (on-call MD unless specified) Completed     10/6/2021  5:15 PM [facility] (on-call MD unless specified) Details Completed           Pertinent Test Results:         Results from last 7 days   Lab Units 10/15/21  0733 10/14/21  0606 10/13/21  0606 10/11/21  0714 10/09/21  0834   WBC 10*3/mm3 10.51 12.19* 9.72 9.49 10.63   HEMOGLOBIN g/dL 8.8* 9.6* 11.2* 11.3* 11.4*   HEMATOCRIT % 26.9* 29.0* 36.2 36.2 34.2   PLATELETS 10*3/mm3 256 271 264 264 200   MCV fL 83.0 84.1 86.4 87.0 83.2   SODIUM mmol/L  --  136 138 134* 129*   POTASSIUM mmol/L  --  4.7 4.2 4.0 3.5   CHLORIDE mmol/L  --  101 101 97* 94*   CO2 mmol/L  --  25.9 28.2 25.5 25.0   BUN mg/dL  --  22 23 47* 43*   CREATININE mg/dL  --  0.49* 0.52* 0.66 0.71   GLUCOSE mg/dL  --  144* 127* 143* 117*   CALCIUM mg/dL  --  8.2* 8.8 8.7 8.6          Results from last 7 days   Lab Units 10/15/21  0733 10/14/21  0606 10/13/21  0606 10/11/21  0714 10/09/21  0834   WBC 10*3/mm3 10.51 12.19* 9.72 9.49 10.63     Results from last 7 days   Lab Units 10/13/21  0606   BILIRUBIN mg/dL 0.4   ALK PHOS U/L 166*   ALT (SGPT) U/L 13   AST (SGOT) U/L 16           Invalid input(s): TG, LDLCALC, LDLREALC      Brief Urine Lab Results  (Last result in the past 365 days)      Color   Clarity   Blood   Leuk  "Est   Nitrite   Protein   CREAT   Urine HCG        02/11/21 2051 Yellow   Clear   Negative   Negative   Negative   Negative                             Condition on Discharge:      Stable    Vital Signs:    /53 (BP Location: Right arm, Patient Position: Lying)   Pulse 83   Temp 97.9 °F (36.6 °C) (Oral)   Resp 18   Ht 167.6 cm (65.98\")   Wt 113 kg (250 lb)   SpO2 90%   BMI 40.37 kg/m²     Physical Exam:    General: Alert and oriented x4, obese, mild distress  Heart: Regular rate and rhythm without murmur rub or thrill.  Lungs: Clear to auscultation bilaterally without use of accessory muscles or respiration.  Abdomen: Soft/nontender/nondistended.  No HSM noted.  MSK: Left arm in sling.  4/5 strength in right upper and lower extremities    Discharge Disposition:    Rehab Facility or Unit (DC - External)    Discharge Medication:       Discharge Medications      New Medications      Instructions Start Date   aspirin 325 MG tablet   325 mg, Oral, Every 12 Hours      bisacodyl 5 MG EC tablet  Commonly known as: DULCOLAX   10 mg, Oral, Daily PRN      insulin glargine 100 UNIT/ML injection  Commonly known as: LANTUS, SEMGLEE  Replaces: TOUJEO SOLOSTAR SC   100 Units, Subcutaneous, Nightly      lisinopril 20 MG tablet  Commonly known as: PRINIVIL,ZESTRIL  Replaces: benazepril 20 MG tablet   20 mg, Oral, Every 24 Hours Scheduled      oxyCODONE-acetaminophen 7.5-325 MG per tablet  Commonly known as: PERCOCET   1 tablet, Oral, Every 4 Hours PRN      polyethylene glycol 17 g packet  Commonly known as: MIRALAX   17 g, Oral, 2 Times Daily         Continue These Medications      Instructions Start Date   amLODIPine 10 MG tablet  Commonly known as: NORVASC   10 mg, Oral      atorvastatin 10 MG tablet  Commonly known as: LIPITOR   10 mg, Oral, Daily      insulin lispro 100 UNIT/ML injection  Commonly known as: humaLOG   15 Units, Subcutaneous, 2 times daily      Jardiance 25 MG tablet tablet  Generic drug: " "empagliflozin   Oral      ondansetron ODT 4 MG disintegrating tablet  Commonly known as: ZOFRAN-ODT   4 mg, Every 8 Hours PRN      VITAMIN D2 PO   Oral, Weekly         Stop These Medications    benazepril 20 MG tablet  Commonly known as: LOTENSIN  Replaced by: lisinopril 20 MG tablet     hydroCHLOROthiazide 50 MG tablet  Commonly known as: HYDRODIURIL     TOUJEO SOLOSTAR SC  Replaced by: insulin glargine 100 UNIT/ML injection            Discharge Diet:     Diet Instructions     Diet: Consistent Carbohydrate      Discharge Diet: Consistent Carbohydrate          Activity at Discharge:     Activity Instructions     Other Activity Instructions      Activity Instructions: follow up 2-3 weeks with Dr. Orta and Dr. Ramos at [facility] ([phone] to make appointment)     Leave splint in place, clean and dry, until follow up  No weightbearing on operative leg  Elevate as much as possible (\"toes above the nose\")  Try to get up and move around at least once per hour while awake to help minimize risk of blood clots          Follow-up Appointments:    No future appointments.  Additional Instructions for the Follow-ups that You Need to Schedule     Discharge Follow-up with Specified Provider: orthopedic surgery as scheduled   As directed      To: orthopedic surgery as scheduled               Test Results Pending at Discharge:    Discharge took 35 minutes including face time with patient, examination of patient, discussion of plan, arrangement of follow-up, discussion with case management and nursing, charting, review of chart, medication reconciliation with greater than 50% of time spent in coordination of care.       Gibran Diallo DO  10/16/21  08:24 EDT  "

## 2021-10-17 VITALS
BODY MASS INDEX: 40.18 KG/M2 | HEIGHT: 66 IN | TEMPERATURE: 97 F | OXYGEN SATURATION: 93 % | SYSTOLIC BLOOD PRESSURE: 101 MMHG | WEIGHT: 250 LBS | RESPIRATION RATE: 16 BRPM | DIASTOLIC BLOOD PRESSURE: 51 MMHG | HEART RATE: 68 BPM

## 2021-10-17 LAB
GLUCOSE BLDC GLUCOMTR-MCNC: 148 MG/DL (ref 70–130)
GLUCOSE BLDC GLUCOMTR-MCNC: 71 MG/DL (ref 70–130)

## 2021-10-17 PROCEDURE — 82962 GLUCOSE BLOOD TEST: CPT

## 2021-10-17 RX ADMIN — POLYETHYLENE GLYCOL 3350 17 G: 17 POWDER, FOR SOLUTION ORAL at 09:10

## 2021-10-17 RX ADMIN — ASPIRIN 325 MG: 325 TABLET ORAL at 09:10

## 2021-10-17 RX ADMIN — LISINOPRIL 20 MG: 20 TABLET ORAL at 09:09

## 2021-10-17 RX ADMIN — Medication 1000 UNITS: at 09:09

## 2021-10-17 RX ADMIN — AMLODIPINE BESYLATE 10 MG: 10 TABLET ORAL at 09:09

## 2021-10-17 RX ADMIN — OXYCODONE AND ACETAMINOPHEN 2 TABLET: 7.5; 325 TABLET ORAL at 01:59

## 2021-10-17 NOTE — DISCHARGE SUMMARY
[facility]  [facility]      Name:  Jessica Barnes   Age:  65 y.o.  Sex:  female  :  1955  MRN:  [id number]   Visit Number:  [id number]  Primary Care Physician:  Kadeem Arteaga Jr., MD  Date of Discharge:  10/17/2021  Admission Date:  10/6/2021      Discharge Diagnosis:     1.  Closed fracture left distal fibula status post fall, status post ORIF  2.  Closed fracture left proximal humerus status post mechanical fall, status post reverse total shoulder arthroplasty on 10/13/2021.  3.  Diabetes mellitus type 2, hemoglobin A1c 6.60.  4.  Essential hypertension, controlled  5.  Morbid obesity.  Active Hospital Problems    Diagnosis  POA   • **Closed fracture of left distal fibula [S82.832A]  Unknown   • Hyponatremia [E87.1]  Unknown   • Drug-induced constipation [K59.03]  Unknown   • Diabetes mellitus (HCC) [E11.9]  Unknown   • Hypertension [I10]  Unknown   • BMI 40.0-44.9, adult (HCC) [Z68.41]  Not Applicable   • Closed fracture of left proximal humerus [S42.202A]  Yes      Resolved Hospital Problems   No resolved problems to display.         Presenting Problem/History of Present Illness:    Other closed nondisplaced fracture of proximal end of left humerus, initial encounter [S42.295A]  Other closed fracture of distal end of left fibula, initial encounter [S82.832A]         Hospital Course:    Patient has distal fib fracture as well as left humerus fracture.  She has been followed by orthopedic surgery.    She had surgery on 10/13/2021  with ORIF of left bimalleolar ankle fracture and left ankle syndesmosis.  She also had left reverse total shoulder arthroplasty for proximal humerus fracture.       She also has diabetes mellitus type 2 and hypertension.  These are being monitored.  These both appear stable at present.     She currently denies any chest pressure, shortness of breath, nausea, vomiting.  Her pain is under control with current pain med regimen.     Orthopedic  surgery has signed off at this point, and okayed the patient for rehab.  Vital signs are stable.  Hemoglobin has been stable.  Blood sugars are stable.  Would monitor before every meal and at bedtime and adjust as needed.    Patient stable for discharge.  Follow-up with orthopedic surgery as scheduled.  Call orthopedic surgery office if any questions regarding weightbearing or management of the left ankle and the left shoulder.  Patient otherwise stable for discharge.  Follow-up with PCP when released from rehab.    Addendum: Seen again on 10/17/2021.  Patient stable for discharge.  Pain is controlled with pain medications.  She denies any chest pressure, shortness of breath, nausea, vomiting.  She will be sent over today via EMS.  Follow-up with orthopedic surgery as scheduled.    Procedures Performed:    Procedure(s):  ANKLE OPEN REDUCTION INTERNAL FIXATION       Consults:     Consults     Date and Time Order Name Status Description    10/6/2021  5:15 PM Ortho (on-call MD unless specified) Completed     10/6/2021  5:15 PM [facility] (on-call MD unless specified) Details Completed           Pertinent Test Results:         Results from last 7 days   Lab Units 10/15/21  0733 10/14/21  0606 10/13/21  0606 10/11/21  0714   WBC 10*3/mm3 10.51 12.19* 9.72 9.49   HEMOGLOBIN g/dL 8.8* 9.6* 11.2* 11.3*   HEMATOCRIT % 26.9* 29.0* 36.2 36.2   PLATELETS 10*3/mm3 256 271 264 264   MCV fL 83.0 84.1 86.4 87.0   SODIUM mmol/L  --  136 138 134*   POTASSIUM mmol/L  --  4.7 4.2 4.0   CHLORIDE mmol/L  --  101 101 97*   CO2 mmol/L  --  25.9 28.2 25.5   BUN mg/dL  --  22 23 47*   CREATININE mg/dL  --  0.49* 0.52* 0.66   GLUCOSE mg/dL  --  144* 127* 143*   CALCIUM mg/dL  --  8.2* 8.8 8.7          Results from last 7 days   Lab Units 10/15/21  0733 10/14/21  0606 10/13/21  0606 10/11/21  0714   WBC 10*3/mm3 10.51 12.19* 9.72 9.49     Results from last 7 days   Lab Units 10/13/21  0606   BILIRUBIN mg/dL 0.4   ALK PHOS U/L 166*   ALT (SGPT) U/L 13  "  AST (SGOT) U/L 16           Invalid input(s): TG, LDLCALC, LDLREALC      Brief Urine Lab Results  (Last result in the past 365 days)      Color   Clarity   Blood   Leuk Est   Nitrite   Protein   CREAT   Urine HCG        02/11/21 2051 Yellow   Clear   Negative   Negative   Negative   Negative                             Condition on Discharge:      Stable    Vital Signs:    /51 (BP Location: Right arm, Patient Position: Lying)   Pulse 68   Temp 97 °F (36.1 °C) (Oral)   Resp 16   Ht 167.6 cm (65.98\")   Wt 113 kg (250 lb)   SpO2 93%   BMI 40.37 kg/m²     Physical Exam:    General: Alert and oriented x4, obese, mild distress  Heart: Regular rate and rhythm without murmur rub or thrill.  Lungs: Clear to auscultation bilaterally without use of accessory muscles or respiration.  Abdomen: Soft/nontender/nondistended.  No HSM noted.  MSK: Left arm in sling.  4/5 strength in right upper and lower extremities    Discharge Disposition:    Rehab Facility or Unit (DC - External)    Discharge Medication:       Discharge Medications      New Medications      Instructions Start Date   aspirin 325 MG tablet   325 mg, Oral, Every 12 Hours      bisacodyl 5 MG EC tablet  Commonly known as: DULCOLAX   10 mg, Oral, Daily PRN      insulin glargine 100 UNIT/ML injection  Commonly known as: LANTUS, SEMGLEE  Replaces: TOUJEO SOLOSTAR SC   100 Units, Subcutaneous, Nightly      lisinopril 20 MG tablet  Commonly known as: PRINIVIL,ZESTRIL  Replaces: benazepril 20 MG tablet   20 mg, Oral, Every 24 Hours Scheduled      oxyCODONE-acetaminophen 7.5-325 MG per tablet  Commonly known as: PERCOCET   1 tablet, Oral, Every 4 Hours PRN      polyethylene glycol 17 g packet  Commonly known as: MIRALAX   17 g, Oral, 2 Times Daily         Continue These Medications      Instructions Start Date   amLODIPine 10 MG tablet  Commonly known as: NORVASC   10 mg, Oral      atorvastatin 10 MG tablet  Commonly known as: LIPITOR   10 mg, Oral, Daily    " "  insulin lispro 100 UNIT/ML injection  Commonly known as: humaLOG   15 Units, Subcutaneous, 2 times daily      Jardiance 25 MG tablet tablet  Generic drug: empagliflozin   Oral      ondansetron ODT 4 MG disintegrating tablet  Commonly known as: ZOFRAN-ODT   4 mg, Every 8 Hours PRN      VITAMIN D2 PO   Oral, Weekly         Stop These Medications    benazepril 20 MG tablet  Commonly known as: LOTENSIN  Replaced by: lisinopril 20 MG tablet     hydroCHLOROthiazide 50 MG tablet  Commonly known as: HYDRODIURIL     TOUJEO SOLOSTAR SC  Replaced by: insulin glargine 100 UNIT/ML injection            Discharge Diet:     Diet Instructions     Diet: Consistent Carbohydrate      Discharge Diet: Consistent Carbohydrate          Activity at Discharge:     Activity Instructions     Other Activity Instructions      Activity Instructions: follow up 2-3 weeks with Dr. Orta and Dr. Ramos at [facility] ([phone] to make appointment)     Leave splint in place, clean and dry, until follow up  No weightbearing on operative leg  Elevate as much as possible (\"toes above the nose\")  Try to get up and move around at least once per hour while awake to help minimize risk of blood clots          Follow-up Appointments:    No future appointments.  Additional Instructions for the Follow-ups that You Need to Schedule     Discharge Follow-up with Specified Provider: orthopedic surgery as scheduled   As directed      To: orthopedic surgery as scheduled               Test Results Pending at Discharge:    Discharge took 35 minutes including face time with patient, examination of patient, discussion of plan, arrangement of follow-up, discussion with case management and nursing, charting, review of chart, medication reconciliation with greater than 50% of time spent in coordination of care.       Gibran Diallo DO  10/17/21  08:18 EDT  "

## 2021-10-17 NOTE — PLAN OF CARE
Goal Outcome Evaluation:  Plan of Care Reviewed With: patient           Outcome Summary: VSS, Purewick in place, blood sugar checks with sliding scale for coverage, Pain managed with PRN percocet x2, Dressing to shoulder and ankle are CDI, Bed alarm on for safety and fall prevention, Continued to use IS, pt still a little worried about rehab and what to expect, but looking forward to moving on with her recovery. plan is to discharge tomorrow to [place] via [facility].

## 2021-10-17 NOTE — PLAN OF CARE
Goal Outcome Evaluation:  Plan of Care Reviewed With: patient        Progress: improving  Outcome Summary: Went over the plan of care and answered all questions. Vitals stable and pain is well controlled. Cath Care completed and all medications given. no other issues this shift.

## 2021-10-18 NOTE — PROGRESS NOTES
Case Management Discharge Note      Final Note: Alexis Camargo skilled via [facility]    Provided Post Acute Provider List?: Yes  Provided Post Acute Provider Quality & Resource List?: Yes  Post Acute Provider Quality and Resource List: Nursing Home  Delivered To: Patient  Support Person: Mati Barnes  Method of Delivery: In person    Selected Continued Care - Discharged on 10/17/2021 Admission date: 10/6/2021 - Discharge disposition: Rehab Facility or Unit (DC - External)    Destination Coordination complete.    Service Provider Selected Services Address Phone Fax Patient Preferred    [facility]  Skilled Nursing [address] [address] [phone] --          Durable Medical Equipment    No services have been selected for the patient.              Dialysis/Infusion    No services have been selected for the patient.              Home Medical Care    No services have been selected for the patient.              Therapy    No services have been selected for the patient.              Community Resources    No services have been selected for the patient.              Community & DME    No services have been selected for the patient.                  Transportation Services  Ambulance: [facility]    Final Discharge Disposition Code: 03 - skilled nursing facility (SNF)

## 2022-07-05 ENCOUNTER — HOSPITAL ENCOUNTER (OUTPATIENT)
Facility: HOSPITAL | Age: 67
Setting detail: OBSERVATION
Discharge: HOME | End: 2022-07-06
Attending: EMERGENCY MEDICINE | Admitting: EMERGENCY MEDICINE

## 2022-07-05 DIAGNOSIS — T78.3XXA ANGIOTENSIN CONVERTING ENZYME INHIBITOR-AGGRAVATED ANGIOEDEMA, INITIAL ENCOUNTER: Primary | ICD-10-CM

## 2022-07-05 DIAGNOSIS — T46.4X5A ANGIOTENSIN CONVERTING ENZYME INHIBITOR-AGGRAVATED ANGIOEDEMA, INITIAL ENCOUNTER: Primary | ICD-10-CM

## 2022-07-05 LAB
ALBUMIN SERPL-MCNC: 4.4 G/DL (ref 3.5–5.2)
ALBUMIN/GLOB SERPL: 1.8 G/DL
ALP SERPL-CCNC: 66 U/L (ref 39–117)
ALT SERPL W P-5'-P-CCNC: 7 U/L (ref 1–33)
ANION GAP SERPL CALCULATED.3IONS-SCNC: 14 MMOL/L (ref 5–15)
AST SERPL-CCNC: 12 U/L (ref 1–32)
BASOPHILS # BLD AUTO: 0.04 10*3/MM3 (ref 0–0.2)
BASOPHILS NFR BLD AUTO: 0.4 % (ref 0–1.5)
BILIRUB SERPL-MCNC: 0.3 MG/DL (ref 0–1.2)
BUN SERPL-MCNC: 23 MG/DL (ref 8–23)
BUN/CREAT SERPL: 37.7 (ref 7–25)
CALCIUM SPEC-SCNC: 9.5 MG/DL (ref 8.6–10.5)
CHLORIDE SERPL-SCNC: 102 MMOL/L (ref 98–107)
CO2 SERPL-SCNC: 27 MMOL/L (ref 22–29)
CREAT SERPL-MCNC: 0.61 MG/DL (ref 0.57–1)
DEPRECATED RDW RBC AUTO: 42.7 FL (ref 37–54)
EGFRCR SERPLBLD CKD-EPI 2021: 98.1 ML/MIN/1.73
EOSINOPHIL # BLD AUTO: 0.14 10*3/MM3 (ref 0–0.4)
EOSINOPHIL NFR BLD AUTO: 1.3 % (ref 0.3–6.2)
ERYTHROCYTE [DISTWIDTH] IN BLOOD BY AUTOMATED COUNT: 13.9 % (ref 12.3–15.4)
GLOBULIN UR ELPH-MCNC: 2.5 GM/DL
GLUCOSE BLDC GLUCOMTR-MCNC: 165 MG/DL (ref 70–130)
GLUCOSE BLDC GLUCOMTR-MCNC: 303 MG/DL (ref 70–130)
GLUCOSE SERPL-MCNC: 168 MG/DL (ref 65–99)
HCT VFR BLD AUTO: 43.5 % (ref 34–46.6)
HGB BLD-MCNC: 14 G/DL (ref 12–15.9)
IMM GRANULOCYTES # BLD AUTO: 0.1 10*3/MM3 (ref 0–0.05)
IMM GRANULOCYTES NFR BLD AUTO: 0.9 % (ref 0–0.5)
LYMPHOCYTES # BLD AUTO: 1.98 10*3/MM3 (ref 0.7–3.1)
LYMPHOCYTES NFR BLD AUTO: 18.8 % (ref 19.6–45.3)
MCH RBC QN AUTO: 27.8 PG (ref 26.6–33)
MCHC RBC AUTO-ENTMCNC: 32.2 G/DL (ref 31.5–35.7)
MCV RBC AUTO: 86.3 FL (ref 79–97)
MONOCYTES # BLD AUTO: 0.56 10*3/MM3 (ref 0.1–0.9)
MONOCYTES NFR BLD AUTO: 5.3 % (ref 5–12)
NEUTROPHILS NFR BLD AUTO: 7.74 10*3/MM3 (ref 1.7–7)
NEUTROPHILS NFR BLD AUTO: 73.3 % (ref 42.7–76)
NRBC BLD AUTO-RTO: 0 /100 WBC (ref 0–0.2)
PLATELET # BLD AUTO: 202 10*3/MM3 (ref 140–450)
PMV BLD AUTO: 10.7 FL (ref 6–12)
POTASSIUM SERPL-SCNC: 3.8 MMOL/L (ref 3.5–5.2)
PROT SERPL-MCNC: 6.9 G/DL (ref 6–8.5)
QT INTERVAL: 372 MS
RBC # BLD AUTO: 5.04 10*6/MM3 (ref 3.77–5.28)
SARS-COV-2 RNA RESP QL NAA+PROBE: NOT DETECTED
SODIUM SERPL-SCNC: 143 MMOL/L (ref 136–145)
WBC NRBC COR # BLD: 10.56 10*3/MM3 (ref 3.4–10.8)

## 2022-07-05 PROCEDURE — 25010000002 METHYLPREDNISOLONE PER 125 MG: Performed by: PHYSICIAN ASSISTANT

## 2022-07-05 PROCEDURE — G0378 HOSPITAL OBSERVATION PER HR: HCPCS

## 2022-07-05 PROCEDURE — 96376 TX/PRO/DX INJ SAME DRUG ADON: CPT

## 2022-07-05 PROCEDURE — 82962 GLUCOSE BLOOD TEST: CPT

## 2022-07-05 PROCEDURE — 99284 EMERGENCY DEPT VISIT MOD MDM: CPT

## 2022-07-05 PROCEDURE — U0003 INFECTIOUS AGENT DETECTION BY NUCLEIC ACID (DNA OR RNA); SEVERE ACUTE RESPIRATORY SYNDROME CORONAVIRUS 2 (SARS-COV-2) (CORONAVIRUS DISEASE [COVID-19]), AMPLIFIED PROBE TECHNIQUE, MAKING USE OF HIGH THROUGHPUT TECHNOLOGIES AS DESCRIBED BY CMS-2020-01-R: HCPCS | Performed by: PHYSICIAN ASSISTANT

## 2022-07-05 PROCEDURE — 93010 ELECTROCARDIOGRAM REPORT: CPT | Performed by: INTERNAL MEDICINE

## 2022-07-05 PROCEDURE — 93005 ELECTROCARDIOGRAM TRACING: CPT

## 2022-07-05 PROCEDURE — 25010000002 DIPHENHYDRAMINE PER 50 MG

## 2022-07-05 PROCEDURE — 25010000002 METHYLPREDNISOLONE PER 125 MG

## 2022-07-05 PROCEDURE — C9803 HOPD COVID-19 SPEC COLLECT: HCPCS

## 2022-07-05 PROCEDURE — 96374 THER/PROPH/DIAG INJ IV PUSH: CPT

## 2022-07-05 PROCEDURE — 96375 TX/PRO/DX INJ NEW DRUG ADDON: CPT

## 2022-07-05 PROCEDURE — 25010000002 DIPHENHYDRAMINE PER 50 MG: Performed by: PHYSICIAN ASSISTANT

## 2022-07-05 PROCEDURE — U0005 INFEC AGEN DETEC AMPLI PROBE: HCPCS | Performed by: PHYSICIAN ASSISTANT

## 2022-07-05 PROCEDURE — 80053 COMPREHEN METABOLIC PANEL: CPT | Performed by: PHYSICIAN ASSISTANT

## 2022-07-05 PROCEDURE — 85025 COMPLETE CBC W/AUTO DIFF WBC: CPT | Performed by: PHYSICIAN ASSISTANT

## 2022-07-05 RX ORDER — SODIUM CHLORIDE 0.9 % (FLUSH) 0.9 %
10 SYRINGE (ML) INJECTION AS NEEDED
Status: DISCONTINUED | OUTPATIENT
Start: 2022-07-05 | End: 2022-07-06 | Stop reason: HOSPADM

## 2022-07-05 RX ORDER — FAMOTIDINE 10 MG/ML
20 INJECTION, SOLUTION INTRAVENOUS ONCE
Status: COMPLETED | OUTPATIENT
Start: 2022-07-05 | End: 2022-07-05

## 2022-07-05 RX ORDER — ASPIRIN 81 MG/1
81 TABLET, CHEWABLE ORAL DAILY
COMMUNITY

## 2022-07-05 RX ORDER — BENAZEPRIL HYDROCHLORIDE 40 MG/1
40 TABLET, FILM COATED ORAL DAILY
COMMUNITY
End: 2022-07-06 | Stop reason: HOSPADM

## 2022-07-05 RX ORDER — METHYLPREDNISOLONE SODIUM SUCCINATE 125 MG/2ML
80 INJECTION, POWDER, LYOPHILIZED, FOR SOLUTION INTRAMUSCULAR; INTRAVENOUS EVERY 6 HOURS
Status: DISCONTINUED | OUTPATIENT
Start: 2022-07-05 | End: 2022-07-06 | Stop reason: HOSPADM

## 2022-07-05 RX ORDER — ERGOCALCIFEROL 1.25 MG/1
50000 CAPSULE ORAL WEEKLY
COMMUNITY

## 2022-07-05 RX ORDER — DEXTROSE MONOHYDRATE 25 G/50ML
25 INJECTION, SOLUTION INTRAVENOUS
Status: DISCONTINUED | OUTPATIENT
Start: 2022-07-05 | End: 2022-07-06 | Stop reason: HOSPADM

## 2022-07-05 RX ORDER — ATORVASTATIN CALCIUM 10 MG/1
10 TABLET, FILM COATED ORAL NIGHTLY
Status: DISCONTINUED | OUTPATIENT
Start: 2022-07-05 | End: 2022-07-06 | Stop reason: HOSPADM

## 2022-07-05 RX ORDER — DIPHENHYDRAMINE HYDROCHLORIDE 50 MG/ML
25 INJECTION INTRAMUSCULAR; INTRAVENOUS ONCE
Status: COMPLETED | OUTPATIENT
Start: 2022-07-05 | End: 2022-07-05

## 2022-07-05 RX ORDER — AMLODIPINE BESYLATE 10 MG/1
10 TABLET ORAL DAILY
Status: DISCONTINUED | OUTPATIENT
Start: 2022-07-06 | End: 2022-07-06 | Stop reason: HOSPADM

## 2022-07-05 RX ORDER — NICOTINE POLACRILEX 4 MG
15 LOZENGE BUCCAL
Status: DISCONTINUED | OUTPATIENT
Start: 2022-07-05 | End: 2022-07-06 | Stop reason: HOSPADM

## 2022-07-05 RX ORDER — POLYETHYLENE GLYCOL 3350 17 G/17G
17 POWDER, FOR SOLUTION ORAL
Status: DISCONTINUED | OUTPATIENT
Start: 2022-07-06 | End: 2022-07-06 | Stop reason: HOSPADM

## 2022-07-05 RX ORDER — FAMOTIDINE 10 MG/ML
20 INJECTION, SOLUTION INTRAVENOUS EVERY 12 HOURS SCHEDULED
Status: DISCONTINUED | OUTPATIENT
Start: 2022-07-06 | End: 2022-07-06 | Stop reason: HOSPADM

## 2022-07-05 RX ORDER — DIPHENHYDRAMINE HYDROCHLORIDE 50 MG/ML
25 INJECTION INTRAMUSCULAR; INTRAVENOUS EVERY 6 HOURS
Status: DISCONTINUED | OUTPATIENT
Start: 2022-07-05 | End: 2022-07-06 | Stop reason: HOSPADM

## 2022-07-05 RX ORDER — INSULIN LISPRO 100 [IU]/ML
0-9 INJECTION, SOLUTION INTRAVENOUS; SUBCUTANEOUS
Status: DISCONTINUED | OUTPATIENT
Start: 2022-07-05 | End: 2022-07-06 | Stop reason: HOSPADM

## 2022-07-05 RX ORDER — ASPIRIN 81 MG/1
81 TABLET, CHEWABLE ORAL DAILY
Status: DISCONTINUED | OUTPATIENT
Start: 2022-07-06 | End: 2022-07-06 | Stop reason: HOSPADM

## 2022-07-05 RX ORDER — SODIUM CHLORIDE 0.9 % (FLUSH) 0.9 %
10 SYRINGE (ML) INJECTION EVERY 12 HOURS SCHEDULED
Status: DISCONTINUED | OUTPATIENT
Start: 2022-07-05 | End: 2022-07-06 | Stop reason: HOSPADM

## 2022-07-05 RX ORDER — METHYLPREDNISOLONE SODIUM SUCCINATE 125 MG/2ML
125 INJECTION, POWDER, LYOPHILIZED, FOR SOLUTION INTRAMUSCULAR; INTRAVENOUS ONCE
Status: COMPLETED | OUTPATIENT
Start: 2022-07-05 | End: 2022-07-05

## 2022-07-05 RX ORDER — INSULIN GLARGINE 300 U/ML
100 INJECTION, SOLUTION SUBCUTANEOUS NIGHTLY
COMMUNITY

## 2022-07-05 RX ORDER — HYDROCHLOROTHIAZIDE 25 MG/1
50 TABLET ORAL DAILY
Status: DISCONTINUED | OUTPATIENT
Start: 2022-07-06 | End: 2022-07-06 | Stop reason: HOSPADM

## 2022-07-05 RX ORDER — HYDROCHLOROTHIAZIDE 50 MG/1
50 TABLET ORAL DAILY
COMMUNITY

## 2022-07-05 RX ADMIN — METHYLPREDNISOLONE SODIUM SUCCINATE 80 MG: 125 INJECTION, POWDER, FOR SOLUTION INTRAMUSCULAR; INTRAVENOUS at 18:32

## 2022-07-05 RX ADMIN — DIPHENHYDRAMINE HYDROCHLORIDE 25 MG: 50 INJECTION, SOLUTION INTRAMUSCULAR; INTRAVENOUS at 18:32

## 2022-07-05 RX ADMIN — ATORVASTATIN CALCIUM 10 MG: 10 TABLET, FILM COATED ORAL at 21:40

## 2022-07-05 RX ADMIN — FAMOTIDINE 20 MG: 10 INJECTION INTRAVENOUS at 12:03

## 2022-07-05 RX ADMIN — INSULIN GLARGINE-YFGN 50 UNITS: 100 INJECTION, SOLUTION SUBCUTANEOUS at 21:40

## 2022-07-05 RX ADMIN — METHYLPREDNISOLONE SODIUM SUCCINATE 80 MG: 125 INJECTION, POWDER, FOR SOLUTION INTRAMUSCULAR; INTRAVENOUS at 23:39

## 2022-07-05 RX ADMIN — DIPHENHYDRAMINE HYDROCHLORIDE 25 MG: 50 INJECTION, SOLUTION INTRAMUSCULAR; INTRAVENOUS at 12:03

## 2022-07-05 RX ADMIN — METHYLPREDNISOLONE SODIUM SUCCINATE 125 MG: 125 INJECTION, POWDER, FOR SOLUTION INTRAMUSCULAR; INTRAVENOUS at 12:03

## 2022-07-05 RX ADMIN — Medication 10 ML: at 21:41

## 2022-07-05 RX ADMIN — FAMOTIDINE 20 MG: 10 INJECTION INTRAVENOUS at 23:40

## 2022-07-05 RX ADMIN — DIPHENHYDRAMINE HYDROCHLORIDE 25 MG: 50 INJECTION, SOLUTION INTRAMUSCULAR; INTRAVENOUS at 23:40

## 2022-07-05 RX ADMIN — Medication 10 ML: at 16:04

## 2022-07-05 NOTE — H&P
[facility]   HISTORY AND PHYSICAL    Patient Name: Jessica Barnes  : 1955  MRN: [id number]  Primary Care Physician:  Kadeem Arteaga Jr., MD  Date of admission: 2022    Subjective   Subjective     Chief Complaint: Tongue swelling    History of Present Illness  Jessica Barnes is a 66-year-old female that is admitted to the observation unit with angioedema.  She states that she woke up this morning and the left side of her tongue was swollen.  She denies any facial swelling.  She states now that her tongue is not as swollen she feels that maybe her throat is swollen.  She has a history of diabetes, hyperlipidemia, and hypertension.  Review of Systems   Constitutional: Negative.    HENT: Negative.         Left-sided tongue swelling, throat swelling   Eyes: Negative.    Respiratory: Negative.  Negative for chest tightness and shortness of breath.    Cardiovascular: Positive for palpitations. Negative for chest pain.   Endocrine: Negative.    Genitourinary: Negative.    Musculoskeletal: Negative.    Skin: Negative.    Allergic/Immunologic: Negative.    Neurological: Negative for dizziness.   Hematological: Negative.    Psychiatric/Behavioral: Negative.         Personal History     Past Medical History:   Diagnosis Date   • Cataract    • Diabetes mellitus (HCC)    • Hyperlipemia    • Hypertension    • Osteoarthritis of knee, unilateral    • PONV (postoperative nausea and vomiting)    • Torn ligament     RIGHT ANKLE       Past Surgical History:   Procedure Laterality Date   • ANKLE OPEN REDUCTION INTERNAL FIXATION Left 10/13/2021    Procedure: ANKLE OPEN REDUCTION INTERNAL FIXATION;  Surgeon: Jacinto Orta Jr., MD;  Location: [facility];  Service: Orthopedics;  Laterality: Left;   •  SECTION     • CHOLECYSTECTOMY     • EXPLORATORY LAPAROTOMY N/A 2021    Procedure: LAPAROTOMY EXPLORATORY FOR BOWEL OBSTRUCTION,LYSIS OF ADHESIONS,PRIMARY VENTRAL HERNIA REPAIR;  Surgeon: Alie Addison MD;   Location: [facility];  Service: General;  Laterality: N/A;   • KNEE ARTHROSCOPY     • TOTAL SHOULDER ARTHROPLASTY W/ DISTAL CLAVICLE EXCISION Left 10/13/2021    Procedure: LEFT TOTAL SHOULDER REVERSE ARTHROPLASTY;  Surgeon: Patrick Ramos MD;  Location: [facility];  Service: Orthopedics;  Laterality: Left;       Family History: family history is not on file. Otherwise pertinent FHx was reviewed and not pertinent to current issue.    Social History:  reports that she has never smoked. She has never used smokeless tobacco. She reports that she does not drink alcohol and does not use drugs.    Home Medications:  Insulin Glargine (2 Unit Dial), amLODIPine, aspirin, atorvastatin, benazepril, empagliflozin, hydroCHLOROthiazide, insulin lispro, ondansetron ODT, polyethylene glycol, and vitamin D    Allergies:  Allergies   Allergen Reactions   • Latex Other (See Comments)     BLISTERS ON ARM   • Penicillins Other (See Comments)     UNKNOWN   • Sulfa Antibiotics Hives       Objective    Objective     Vitals:   Temp:  [96.6 °F (35.9 °C)-98.4 °F (36.9 °C)] 98.4 °F (36.9 °C)  Heart Rate:  [82-87] 87  Resp:  [16] 16  BP: (154-180)/(54-76) 154/69    Physical Exam  Vitals and nursing note reviewed.   Constitutional:       General: She is not in acute distress.     Appearance: Normal appearance. She is obese.   HENT:      Head: Normocephalic.      Mouth/Throat:      Mouth: Mucous membranes are moist.      Comments: Mild left-sided tongue swelling  Cardiovascular:      Rate and Rhythm: Normal rate and regular rhythm.      Pulses: Normal pulses.      Heart sounds: Normal heart sounds.   Pulmonary:      Effort: Pulmonary effort is normal.      Breath sounds: Normal breath sounds.   Abdominal:      General: Bowel sounds are normal.      Palpations: Abdomen is soft.   Musculoskeletal:         General: Normal range of motion.   Skin:     General: Skin is warm and dry.      Capillary Refill: Capillary refill takes less than 2  seconds.   Neurological:      General: No focal deficit present.      Mental Status: She is alert and oriented to person, place, and time.   Psychiatric:         Mood and Affect: Mood normal.         Behavior: Behavior normal.         Thought Content: Thought content normal.         Judgment: Judgment normal.         Result Review    Result Review:  I have personally reviewed the results from the time of this admission to 7/5/2022 14:47 EDT and agree with these findings:  [x]  Laboratory list / accordion  [x]  Microbiology  []  Radiology  []  EKG/Telemetry   []  Cardiology/Vascular   []  Pathology  [x]  Old records  []  Other:  Most notable findings include: Glucose 168      Assessment & Plan   Assessment / Plan     Brief Patient Summary:  Jessica Barnes is a 66 y.o. female who was admitted to the observation unit for further evaluation of her angioedema.  Active Hospital Problems:  Active Hospital Problems    Diagnosis    • Angioedema      Plan:     Angioedema:  -Solu-Medrol IV every 6 hours  -Benadryl IV every 6 hours  -Pepcid IV every 12 hours  -Discontinue benazepril  -Monitor blood pressure        DVT prophylaxis:  No DVT prophylaxis order currently exists.    CODE STATUS:       Admission Status:  I believe this patient meets observation status.    LIAM Melendez

## 2022-07-05 NOTE — ED PROVIDER NOTES
EMERGENCY DEPARTMENT ENCOUNTER    Room Number:  110/1  Date of encounter:  7/5/2022  PCP: Kadeem Arteaga Jr., MD  Historian: Patient      I used full protective equipment while examining this patient.  This includes face mask, gloves and protective eyewear.  I washed my hands before entering the room and immediately upon leaving the room      HPI:  Chief Complaint: Tongue swelling  A complete HPI/ROS/PMH/PSH/SH/FH are unobtainable due to: Nothing    Context: Jessica Barnes is a 66 y.o. female who presents to the ED c/o gradual onset of tongue swelling earlier this morning.  Patient states this morning she noticed that she her tongue felt swollen, particularly on the left side.  She had difficulty speaking.  She denies any problems with breathing or swallowing.  She denies any prior similar episodes.  Patient is on benazepril for history of hypertension.  She has been on this medication for many years.  She denies any lip swelling, rash.    Review of Medical Records  I reviewed patient's last admission from 10/6/2021.  Patient seen for fall with left humerus, left ankle fracture.    PAST MEDICAL HISTORY  Active Ambulatory Problems     Diagnosis Date Noted   • Small bowel obstruction (HCC) 02/11/2021   • Closed fracture of left proximal humerus 10/06/2021   • Closed fracture of left distal fibula 10/06/2021   • Diabetes mellitus (HCC)    • Hypertension    • BMI 40.0-44.9, adult (HCC)    • Hyponatremia 10/09/2021   • Drug-induced constipation 10/09/2021     Resolved Ambulatory Problems     Diagnosis Date Noted   • No Resolved Ambulatory Problems     Past Medical History:   Diagnosis Date   • Cataract    • Hyperlipemia    • Osteoarthritis of knee, unilateral    • PONV (postoperative nausea and vomiting)    • Torn ligament          PAST SURGICAL HISTORY  Past Surgical History:   Procedure Laterality Date   • ANKLE OPEN REDUCTION INTERNAL FIXATION Left 10/13/2021    Procedure: ANKLE OPEN REDUCTION INTERNAL FIXATION;   Surgeon: Jacinto Orta Jr., MD;  Location: [facility] OR;  Service: Orthopedics;  Laterality: Left;   •  SECTION     • CHOLECYSTECTOMY     • EXPLORATORY LAPAROTOMY N/A 2021    Procedure: LAPAROTOMY EXPLORATORY FOR BOWEL OBSTRUCTION,LYSIS OF ADHESIONS,PRIMARY VENTRAL HERNIA REPAIR;  Surgeon: Alie Addison MD;  Location: [facility] OR;  Service: General;  Laterality: N/A;   • KNEE ARTHROSCOPY     • TOTAL SHOULDER ARTHROPLASTY W/ DISTAL CLAVICLE EXCISION Left 10/13/2021    Procedure: LEFT TOTAL SHOULDER REVERSE ARTHROPLASTY;  Surgeon: Patrick Ramos MD;  Location: [facility] OR;  Service: Orthopedics;  Laterality: Left;         FAMILY HISTORY  No family history on file.      SOCIAL HISTORY  Social History     Socioeconomic History   • Marital status:    Tobacco Use   • Smoking status: Never Smoker   • Smokeless tobacco: Never Used   Vaping Use   • Vaping Use: Never used   Substance and Sexual Activity   • Alcohol use: No   • Drug use: No   • Sexual activity: Defer         ALLERGIES  Latex, Penicillins, and Sulfa antibiotics        REVIEW OF SYSTEMS  All systems reviewed and negative except for those discussed in HPI.       PHYSICAL EXAM    I have reviewed the triage vital signs and nursing notes.    ED Triage Vitals [22 1054]   Temp Heart Rate Resp BP SpO2   96.6 °F (35.9 °C) 86 16 -- 94 %      Temp src Heart Rate Source Patient Position BP Location FiO2 (%)   Tympanic Monitor -- -- --       Physical Exam  GENERAL: Alert, oriented, not distressed  HENT: Mild swelling to the left tongue.  Oropharynx patent.  Lips appear normal.  No stridor.  EYES: no scleral icterus, EOMI  CV: regular rhythm, regular rate, no murmur  RESPIRATORY: normal effort, CTA  ABDOMEN: soft, nontender  MUSCULOSKELETAL: no deformity, FROM, no calf swelling or tenderness  NEURO: alert, moves all extremities, follows commands  SKIN: warm, dry        LAB RESULTS  Recent Results (from the past 24 hour(s))    Comprehensive Metabolic Panel    Collection Time: 07/05/22 11:53 AM    Specimen: Blood   Result Value Ref Range    Glucose 168 (H) 65 - 99 mg/dL    BUN 23 8 - 23 mg/dL    Creatinine 0.61 0.57 - 1.00 mg/dL    Sodium 143 136 - 145 mmol/L    Potassium 3.8 3.5 - 5.2 mmol/L    Chloride 102 98 - 107 mmol/L    CO2 27.0 22.0 - 29.0 mmol/L    Calcium 9.5 8.6 - 10.5 mg/dL    Total Protein 6.9 6.0 - 8.5 g/dL    Albumin 4.40 3.50 - 5.20 g/dL    ALT (SGPT) 7 1 - 33 U/L    AST (SGOT) 12 1 - 32 U/L    Alkaline Phosphatase 66 39 - 117 U/L    Total Bilirubin 0.3 0.0 - 1.2 mg/dL    Globulin 2.5 gm/dL    A/G Ratio 1.8 g/dL    BUN/Creatinine Ratio 37.7 (H) 7.0 - 25.0    Anion Gap 14.0 5.0 - 15.0 mmol/L    eGFR 98.1 >60.0 mL/min/1.73   CBC Auto Differential    Collection Time: 07/05/22 11:53 AM    Specimen: Blood   Result Value Ref Range    WBC 10.56 3.40 - 10.80 10*3/mm3    RBC 5.04 3.77 - 5.28 10*6/mm3    Hemoglobin 14.0 12.0 - 15.9 g/dL    Hematocrit 43.5 34.0 - 46.6 %    MCV 86.3 79.0 - 97.0 fL    MCH 27.8 26.6 - 33.0 pg    MCHC 32.2 31.5 - 35.7 g/dL    RDW 13.9 12.3 - 15.4 %    RDW-SD 42.7 37.0 - 54.0 fl    MPV 10.7 6.0 - 12.0 fL    Platelets 202 140 - 450 10*3/mm3    Neutrophil % 73.3 42.7 - 76.0 %    Lymphocyte % 18.8 (L) 19.6 - 45.3 %    Monocyte % 5.3 5.0 - 12.0 %    Eosinophil % 1.3 0.3 - 6.2 %    Basophil % 0.4 0.0 - 1.5 %    Immature Grans % 0.9 (H) 0.0 - 0.5 %    Neutrophils, Absolute 7.74 (H) 1.70 - 7.00 10*3/mm3    Lymphocytes, Absolute 1.98 0.70 - 3.10 10*3/mm3    Monocytes, Absolute 0.56 0.10 - 0.90 10*3/mm3    Eosinophils, Absolute 0.14 0.00 - 0.40 10*3/mm3    Basophils, Absolute 0.04 0.00 - 0.20 10*3/mm3    Immature Grans, Absolute 0.10 (H) 0.00 - 0.05 10*3/mm3    nRBC 0.0 0.0 - 0.2 /100 WBC   COVID-19,BH TIA IN-HOUSE CEPHEID/MARICEL NP SWAB IN TRANSPORT MEDIA 8-12 HR TAT - Swab, Nasopharynx    Collection Time: 07/05/22  1:09 PM    Specimen: Nasopharynx; Swab   Result Value Ref Range    COVID19 Not Detected Not  Detected - Ref. Range   ECG 12 Lead    Collection Time: 07/05/22  3:47 PM   Result Value Ref Range    QT Interval 372 ms       Ordered the above labs and independently reviewed the results.      MEDICATIONS GIVEN IN ER    Medications   sodium chloride 0.9 % flush 10 mL (has no administration in time range)   sodium chloride 0.9 % flush 10 mL (has no administration in time range)   sodium chloride 0.9 % flush 10 mL (has no administration in time range)   diphenhydrAMINE (BENADRYL) injection 25 mg (has no administration in time range)   famotidine (PEPCID) injection 20 mg (has no administration in time range)   methylPREDNISolone sodium succinate (SOLU-Medrol) injection 80 mg (has no administration in time range)   amLODIPine (NORVASC) tablet 10 mg (has no administration in time range)   aspirin chewable tablet 81 mg (has no administration in time range)   atorvastatin (LIPITOR) tablet 10 mg (has no administration in time range)   empagliflozin (JARDIANCE) tablet 25 mg (has no administration in time range)   hydroCHLOROthiazide (HYDRODIURIL) tablet 50 mg (has no administration in time range)   insulin glargine (LANTUS, SEMGLEE) injection 100 Units (has no administration in time range)   polyethylene glycol (MIRALAX) packet 17 g (has no administration in time range)   dextrose (GLUTOSE) oral gel 15 g (has no administration in time range)   dextrose (D50W) (25 g/50 mL) IV injection 25 g (has no administration in time range)   glucagon (human recombinant) (GLUCAGEN DIAGNOSTIC) injection 1 mg (has no administration in time range)   insulin lispro (ADMELOG) injection 0-9 Units (has no administration in time range)   methylPREDNISolone sodium succinate (SOLU-Medrol) injection 125 mg (125 mg Intravenous Given 7/5/22 1203)   diphenhydrAMINE (BENADRYL) injection 25 mg (25 mg Intravenous Given 7/5/22 1203)   famotidine (PEPCID) injection 20 mg (20 mg Intravenous Given 7/5/22 1203)         PROGRESS, DATA ANALYSIS, CONSULTS, AND  MEDICAL DECISION MAKING    All labs have been independently reviewed by me.  All radiology studies have been reviewed by me and discussed with radiologist dictating the report.   EKG's independently viewed and interpreted by me.  Discussion below represents my analysis of pertinent findings related to patient's condition, differential diagnosis, treatment plan and final disposition.    I have discussed case with Dr. Kim, emergency room physician.  He has performed his own bedside examination and agrees with treatment plan.    ED Course as of 07/05/22 1551   Tue Jul 05, 2022   1128 Patient presents with tongue swelling since early this morning.  Patient is handling her secretions well.  She denies any shortness of breath.  No stridor.  Patient is on an ACE inhibitor.  Concern for ACE inhibitor induced angioedema.  Plan to treat reaction and monitor in the ER. [EE]   1253 WBC: 10.56 [EE]   1253 Hemoglobin: 14.0 [EE]   1253 Creatinine: 0.61 [EE]   1253 Recheck of patient.  Angioedema still present.  No difficulty with secretions or breathing.    I believe the best course of action would be to monitor patient for several hours.  We will place her in the observation unit for further care.    I called and spoke with Zainab Soriano, nurse practitioner in the observation unit.  She agrees to admit the patient. [EE]      ED Course User Index  [EE] Maxim Duval PA       AS OF 15:51 EDT VITALS:    BP - 154/69  HR - 87  TEMP - 98.4 °F (36.9 °C) (Oral)  O2 SATS - 96%        DIAGNOSIS  Final diagnoses:   Angiotensin converting enzyme inhibitor-aggravated angioedema, initial encounter         DISPOSITION  Admitted      Dictated utilizing Dragon dictation     Maxim Duval PA  07/05/22 4826

## 2022-07-05 NOTE — PLAN OF CARE
Goal Outcome Evaluation:  Plan of Care Reviewed With: patient   VSS; A&O x4; pt denies pain; pt adlib; pt swelling of the tongue resolved; pt speech normal; pt exam of mouth normal; pt able to tolerate fluids and solids with no issues; pt waiting for further recommendation.      Progress: improving

## 2022-07-05 NOTE — ED TRIAGE NOTES
Pt has tongue swelling this am.  She is on benazepril    Patient was placed in face mask during first look triage.  Patient was wearing a face mask throughout encounter.  I wore personal protective equipment throughout the encounter.  Hand hygiene was performed before and after patient encounter.

## 2022-07-05 NOTE — PROGRESS NOTES
Clinical Pharmacy Services: Medication History    Jessica Barnes is a 66 y.o. female presenting to [facility] for   Chief Complaint   Patient presents with   • Angioedema       She  has a past medical history of Cataract, Diabetes mellitus (HCC), Hyperlipemia, Hypertension, Osteoarthritis of knee, unilateral, PONV (postoperative nausea and vomiting), and Torn ligament.    Allergies as of 07/05/2022 - Reviewed 07/05/2022   Allergen Reaction Noted   • Latex Other (See Comments) 04/18/2017   • Penicillins Other (See Comments) 04/18/2017   • Sulfa antibiotics Hives 04/18/2017       Medication information was obtained from: Patient/Pharmacy  Pharmacy and Phone Number:   [address] - [address] [facility] - [phone]  - [phone]   [address] [facility] [address]  Phone: [phone] Fax: [phone]        Prior to Admission Medications     Prescriptions Last Dose Informant Patient Reported? Taking?    amLODIPine (NORVASC) 10 MG tablet 7/5/2022 Self Yes Yes    Take 10 mg by mouth Daily.    aspirin 81 MG chewable tablet 7/5/2022 Self Yes Yes    Chew 81 mg Daily.    atorvastatin (LIPITOR) 10 MG tablet 7/4/2022 Self Yes Yes    Take 10 mg by mouth Every Night.    benazepril (LOTENSIN) 40 MG tablet 7/5/2022 Self Yes Yes    Take 40 mg by mouth Daily.    Empagliflozin (Jardiance) 25 MG tablet 7/5/2022 Self Yes Yes    Take 25 mg by mouth Daily.    hydroCHLOROthiazide (HYDRODIURIL) 50 MG tablet 7/5/2022 Pharmacy Yes Yes    Take 50 mg by mouth Daily.    Insulin Glargine, 2 Unit Dial, (Toujeo Max SoloStar) 300 UNIT/ML solution pen-injector injection 7/4/2022 Pharmacy Yes Yes    Inject 100 Units under the skin into the appropriate area as directed Every Night.    insulin lispro (humaLOG, ADMELOG) 100 UNIT/ML injection 7/4/2022 Self Yes Yes    Inject 15-20 Units under the skin into the appropriate area as directed 2 (Two) Times a Day With Meals. Per Sliding Scale     polyethylene glycol (polyethylene glycol) 17 g packet 7/5/2022 Self No Yes    Take 17 g by mouth 2 (Two) Times a Day.    Patient taking differently:  Take 17 g by mouth Every Morning.    vitamin D (ERGOCALCIFEROL) 1.25 MG (80938 UT) capsule capsule Past Week Self Yes Yes    Take 50,000 Units by mouth 1 (One) Time Per Week. Saturdays    ondansetron ODT (ZOFRAN-ODT) 4 MG disintegrating tablet More than a month Self Yes No    Place 4 mg on the tongue Every 8 (Eight) Hours As Needed.            Medication notes: Patient states she adjust her toujeo as needed according to her blood sugar.     This medication list is complete to the best of my knowledge as of 7/5/2022    Please call if questions.    Tyron Gore  Medication History Technician  [phone]    7/5/2022 13:44 EDT

## 2022-07-05 NOTE — ED PROVIDER NOTES
MD ATTESTATION NOTE    The SANJEEV and I have discussed this patient's history, physical exam, and treatment plan.  I have reviewed the documentation and personally had a face to face interaction with the patient. I affirm the documentation and agree with the treatment and plan.  The attached note describes my personal findings.    I provided a substantive portion of the care of this patient. I personally performed the physical exam, in its entirety.    Jessica Barnes is a 66 y.o. female who presents to the ED c/o tongue swelling that started this morning.  She reports that she is on benazepril.  She woke up this morning with a funny feeling to the left side of her tongue.  She reports that she has had difficulty swallowing as well as difficulty talking.  She reports  she has never had similar episodes in the past.  She has not had any treatment for her symptoms.  She reports her daughter is a nurse practitioner and directed her to the emergency room for further evaluation.  She is a diabetic.  Her symptoms have been constant since the onset.  She reports she thinks her somewhat improved since this morning  Although they are still pretty significant.      On exam:  GENERAL: Awake, alert, no acute distress  SKIN: Warm, dry  HENT: Normocephalic, atraumatic asymmetric tongue swelling with the left greater than right.  No stridor or drooling.  Altered voice.  EYES: no scleral icterus  CV: regular rhythm, regular rate  RESPIRATORY: normal effort, lungs clear  ABDOMEN: soft, nontender, nondistended  MUSCULOSKELETAL: no deformity  NEURO: alert, moves all extremities, follows commands    Labs  Recent Results (from the past 24 hour(s))   Comprehensive Metabolic Panel    Collection Time: 07/05/22 11:53 AM    Specimen: Blood   Result Value Ref Range    Glucose 168 (H) 65 - 99 mg/dL    BUN 23 8 - 23 mg/dL    Creatinine 0.61 0.57 - 1.00 mg/dL    Sodium 143 136 - 145 mmol/L    Potassium 3.8 3.5 - 5.2 mmol/L    Chloride 102 98 - 107  mmol/L    CO2 27.0 22.0 - 29.0 mmol/L    Calcium 9.5 8.6 - 10.5 mg/dL    Total Protein 6.9 6.0 - 8.5 g/dL    Albumin 4.40 3.50 - 5.20 g/dL    ALT (SGPT) 7 1 - 33 U/L    AST (SGOT) 12 1 - 32 U/L    Alkaline Phosphatase 66 39 - 117 U/L    Total Bilirubin 0.3 0.0 - 1.2 mg/dL    Globulin 2.5 gm/dL    A/G Ratio 1.8 g/dL    BUN/Creatinine Ratio 37.7 (H) 7.0 - 25.0    Anion Gap 14.0 5.0 - 15.0 mmol/L    eGFR 98.1 >60.0 mL/min/1.73   CBC Auto Differential    Collection Time: 07/05/22 11:53 AM    Specimen: Blood   Result Value Ref Range    WBC 10.56 3.40 - 10.80 10*3/mm3    RBC 5.04 3.77 - 5.28 10*6/mm3    Hemoglobin 14.0 12.0 - 15.9 g/dL    Hematocrit 43.5 34.0 - 46.6 %    MCV 86.3 79.0 - 97.0 fL    MCH 27.8 26.6 - 33.0 pg    MCHC 32.2 31.5 - 35.7 g/dL    RDW 13.9 12.3 - 15.4 %    RDW-SD 42.7 37.0 - 54.0 fl    MPV 10.7 6.0 - 12.0 fL    Platelets 202 140 - 450 10*3/mm3    Neutrophil % 73.3 42.7 - 76.0 %    Lymphocyte % 18.8 (L) 19.6 - 45.3 %    Monocyte % 5.3 5.0 - 12.0 %    Eosinophil % 1.3 0.3 - 6.2 %    Basophil % 0.4 0.0 - 1.5 %    Immature Grans % 0.9 (H) 0.0 - 0.5 %    Neutrophils, Absolute 7.74 (H) 1.70 - 7.00 10*3/mm3    Lymphocytes, Absolute 1.98 0.70 - 3.10 10*3/mm3    Monocytes, Absolute 0.56 0.10 - 0.90 10*3/mm3    Eosinophils, Absolute 0.14 0.00 - 0.40 10*3/mm3    Basophils, Absolute 0.04 0.00 - 0.20 10*3/mm3    Immature Grans, Absolute 0.10 (H) 0.00 - 0.05 10*3/mm3    nRBC 0.0 0.0 - 0.2 /100 WBC   COVID-19,BH TIA IN-HOUSE CEPHEID/MARICEL NP SWAB IN TRANSPORT MEDIA 8-12 HR TAT - Swab, Nasopharynx    Collection Time: 07/05/22  1:09 PM    Specimen: Nasopharynx; Swab   Result Value Ref Range    COVID19 Not Detected Not Detected - Ref. Range       Radiology  No Radiology Exams Resulted Within Past 24 Hours    Medical Decision Making:  ED Course as of 07/05/22 1508   Tue Jul 05, 2022   1128 Patient presents with tongue swelling since early this morning.  Patient is handling her secretions well.  She denies any shortness  of breath.  No stridor.  Patient is on an ACE inhibitor.  Concern for ACE inhibitor induced angioedema.  Plan to treat reaction and monitor in the ER. [EE]   1253 WBC: 10.56 [EE]   1253 Hemoglobin: 14.0 [EE]   1253 Creatinine: 0.61 [EE]   1253 Recheck of patient.  Angioedema still present.  No difficulty with secretions or breathing.    I believe the best course of action would be to monitor patient for several hours.  We will place her in the observation unit for further care.    I called and spoke with Zainab Soriano, nurse practitioner in the observation unit.  She agrees to admit the patient. [EE]      ED Course User Index  [EE] Maxim Duval, PA       Clinically the patient has ACE inhibitor induced angioedema.  Plan Benadryl, steroids, Pepcid.  If she does not entirely improve, we will consider admitting her to the observation unit for further management.  With her diabetes I suspect she will have some hyperglycemia related to her steroids.    PPE: The patient wore a mask and I wore an N95 mask throughout the entire patient encounter.      The patient has a COVID HM Topic on their chart, and they are fully vaccinated.    Diagnosis  Final diagnoses:   Angiotensin converting enzyme inhibitor-aggravated angioedema, initial encounter        Alex Kim MD  07/05/22 8625

## 2022-07-05 NOTE — ED NOTES
"Nursing report ED to floor  Jessica Barnes  66 y.o.  female    HPI :   Chief Complaint   Patient presents with   • Angioedema       Admitting doctor:   Hesham Yung MD    Admitting diagnosis:   The encounter diagnosis was Angiotensin converting enzyme inhibitor-aggravated angioedema, initial encounter.    Code status:   Current Code Status     Date Active Code Status Order ID Comments User Context       Prior    Advance Care Planning Activity          Allergies:   Latex, Penicillins, and Sulfa antibiotics    Intake and Output  No intake or output data in the 24 hours ending 07/05/22 1329    Weight:       07/05/22  1122   Weight: 103 kg (226 lb)       Most recent vitals:   Vitals:    07/05/22 1122 07/05/22 1151 07/05/22 1222 07/05/22 1227   BP:  180/54 155/76    Pulse:  82 82    Resp:       Temp:       TempSrc:       SpO2:  95%  93%   Weight: 103 kg (226 lb)      Height: 167.6 cm (66\")          Active LDAs/IV Access:   Lines, Drains & Airways     Active LDAs     Name Placement date Placement time Site Days    Peripheral IV 10/15/21 1355 Right Forearm 10/15/21  1355  Forearm  262    External Urinary Catheter 10/13/21  --  --  265                Labs (abnormal labs have a star):   Labs Reviewed   COMPREHENSIVE METABOLIC PANEL - Abnormal; Notable for the following components:       Result Value    Glucose 168 (*)     BUN/Creatinine Ratio 37.7 (*)     All other components within normal limits    Narrative:     GFR Normal >60  Chronic Kidney Disease <60  Kidney Failure <15     CBC WITH AUTO DIFFERENTIAL - Abnormal; Notable for the following components:    Lymphocyte % 18.8 (*)     Immature Grans % 0.9 (*)     Neutrophils, Absolute 7.74 (*)     Immature Grans, Absolute 0.10 (*)     All other components within normal limits   COVID PRE-OP / PRE-PROCEDURE SCREENING ORDER (NO ISOLATION)    Narrative:     The following orders were created for panel order COVID PRE-OP / PRE-PROCEDURE SCREENING ORDER (NO ISOLATION) - Swab, " Nasopharynx.  Procedure                               Abnormality         Status                     ---------                               -----------         ------                     COVID-19, TIA IN-HOUSE...[[id number]]                      In process                   Please view results for these tests on the individual orders.   COVID-19, TIA IN-HOUSE CEPHEID/MARICEL, NP SWAB IN TRANSPORT MEDIA 8-12 HR TAT   CBC AND DIFFERENTIAL    Narrative:     The following orders were created for panel order CBC & Differential.  Procedure                               Abnormality         Status                     ---------                               -----------         ------                     CBC Auto Differential[[id number]]        Abnormal            Final result                 Please view results for these tests on the individual orders.       EKG:   No orders to display       Meds given in ED:   Medications   sodium chloride 0.9 % flush 10 mL (has no administration in time range)   sodium chloride 0.9 % flush 10 mL (has no administration in time range)   sodium chloride 0.9 % flush 10 mL (has no administration in time range)   methylPREDNISolone sodium succinate (SOLU-Medrol) injection 125 mg (125 mg Intravenous Given 7/5/22 1203)   diphenhydrAMINE (BENADRYL) injection 25 mg (25 mg Intravenous Given 7/5/22 1203)   famotidine (PEPCID) injection 20 mg (20 mg Intravenous Given 7/5/22 1203)       Imaging results:  No radiology results for the last day    Ambulatory status:       Social issues:   Social History     Socioeconomic History   • Marital status:    Tobacco Use   • Smoking status: Never Smoker   • Smokeless tobacco: Never Used   Vaping Use   • Vaping Use: Never used   Substance and Sexual Activity   • Alcohol use: No   • Drug use: No   • Sexual activity: Defer       NIH Stroke Scale:        Nursing report ED to floor:

## 2022-07-06 VITALS
DIASTOLIC BLOOD PRESSURE: 63 MMHG | RESPIRATION RATE: 16 BRPM | SYSTOLIC BLOOD PRESSURE: 150 MMHG | HEART RATE: 80 BPM | TEMPERATURE: 98.3 F | BODY MASS INDEX: 36.32 KG/M2 | WEIGHT: 226 LBS | OXYGEN SATURATION: 93 % | HEIGHT: 66 IN

## 2022-07-06 LAB
ANION GAP SERPL CALCULATED.3IONS-SCNC: 12.5 MMOL/L (ref 5–15)
BUN SERPL-MCNC: 24 MG/DL (ref 8–23)
BUN/CREAT SERPL: 47.1 (ref 7–25)
CALCIUM SPEC-SCNC: 9.6 MG/DL (ref 8.6–10.5)
CHLORIDE SERPL-SCNC: 102 MMOL/L (ref 98–107)
CO2 SERPL-SCNC: 24.5 MMOL/L (ref 22–29)
CREAT SERPL-MCNC: 0.51 MG/DL (ref 0.57–1)
DEPRECATED RDW RBC AUTO: 40.1 FL (ref 37–54)
EGFRCR SERPLBLD CKD-EPI 2021: 102.5 ML/MIN/1.73
ERYTHROCYTE [DISTWIDTH] IN BLOOD BY AUTOMATED COUNT: 13.5 % (ref 12.3–15.4)
GLUCOSE BLDC GLUCOMTR-MCNC: 186 MG/DL (ref 70–130)
GLUCOSE SERPL-MCNC: 187 MG/DL (ref 65–99)
HCT VFR BLD AUTO: 41.3 % (ref 34–46.6)
HGB BLD-MCNC: 14.2 G/DL (ref 12–15.9)
MAGNESIUM SERPL-MCNC: 2 MG/DL (ref 1.6–2.4)
MCH RBC QN AUTO: 28.6 PG (ref 26.6–33)
MCHC RBC AUTO-ENTMCNC: 34.4 G/DL (ref 31.5–35.7)
MCV RBC AUTO: 83.3 FL (ref 79–97)
PLATELET # BLD AUTO: 251 10*3/MM3 (ref 140–450)
PMV BLD AUTO: 10.4 FL (ref 6–12)
POTASSIUM SERPL-SCNC: 3.6 MMOL/L (ref 3.5–5.2)
RBC # BLD AUTO: 4.96 10*6/MM3 (ref 3.77–5.28)
SODIUM SERPL-SCNC: 139 MMOL/L (ref 136–145)
WBC NRBC COR # BLD: 14.1 10*3/MM3 (ref 3.4–10.8)

## 2022-07-06 PROCEDURE — G0378 HOSPITAL OBSERVATION PER HR: HCPCS

## 2022-07-06 PROCEDURE — 82962 GLUCOSE BLOOD TEST: CPT

## 2022-07-06 PROCEDURE — 96376 TX/PRO/DX INJ SAME DRUG ADON: CPT

## 2022-07-06 PROCEDURE — 83735 ASSAY OF MAGNESIUM: CPT | Performed by: EMERGENCY MEDICINE

## 2022-07-06 PROCEDURE — 80048 BASIC METABOLIC PNL TOTAL CA: CPT | Performed by: EMERGENCY MEDICINE

## 2022-07-06 PROCEDURE — 25010000002 DIPHENHYDRAMINE PER 50 MG

## 2022-07-06 PROCEDURE — 85027 COMPLETE CBC AUTOMATED: CPT | Performed by: EMERGENCY MEDICINE

## 2022-07-06 PROCEDURE — 25010000002 METHYLPREDNISOLONE PER 125 MG

## 2022-07-06 PROCEDURE — 63710000001 INSULIN LISPRO (HUMAN) PER 5 UNITS

## 2022-07-06 RX ADMIN — DIPHENHYDRAMINE HYDROCHLORIDE 25 MG: 50 INJECTION, SOLUTION INTRAMUSCULAR; INTRAVENOUS at 05:43

## 2022-07-06 RX ADMIN — ASPIRIN 81 MG: 81 TABLET, CHEWABLE ORAL at 08:43

## 2022-07-06 RX ADMIN — INSULIN LISPRO 2 UNITS: 100 INJECTION, SOLUTION INTRAVENOUS; SUBCUTANEOUS at 08:44

## 2022-07-06 RX ADMIN — METHYLPREDNISOLONE SODIUM SUCCINATE 80 MG: 125 INJECTION, POWDER, FOR SOLUTION INTRAMUSCULAR; INTRAVENOUS at 05:43

## 2022-07-06 RX ADMIN — HYDROCHLOROTHIAZIDE 50 MG: 25 TABLET ORAL at 08:43

## 2022-07-06 RX ADMIN — AMLODIPINE BESYLATE 10 MG: 10 TABLET ORAL at 08:44

## 2022-07-06 RX ADMIN — POLYETHYLENE GLYCOL 3350 17 G: 17 POWDER, FOR SOLUTION ORAL at 08:44

## 2022-07-06 NOTE — PLAN OF CARE
Goal Outcome Evaluation:   Plan of care reviewed with patient   Outcome summary: Pt admitted for angioedema, left side of tongue swelling, tongue swelling has resolved, pt reported some difficulty swallowing that has also resolved, pt AOX4, vitals stable, up without assistance.

## 2022-07-06 NOTE — PROGRESS NOTES
ED OBSERVATION PROGRESS/DISCHARGE SUMMARY    Date of Admission: 7/5/2022   LOS: 0 days   PCP: Kadeem Arteaga Jr., MD    Subjective  Patient reported difficulty swallowing overnight, this is resolved.    Hospital Outcome: Patient was admitted to observation unit for further evaluation of angioedema, thought to be caused by ACE inhibitor.  Patient had swelling to the left side of her tongue which has since improved after receiving Solu-Medrol, Benadryl, and Pepcid.  Patient then complained of some difficulty swallowing.  However, she was able to tolerate solids and liquids and states this has since resolved.    ROS:  General: no fevers, chills  Respiratory: no cough, dyspnea  Cardiovascular: no chest pain, palpitations  Abdomen: No abdominal pain, nausea, vomiting, or diarrhea  Neurologic: No focal weakness    Objective   Physical Exam:  I have reviewed the vital signs.  Temp:  [96.6 °F (35.9 °C)-98.4 °F (36.9 °C)] 98.3 °F (36.8 °C)  Heart Rate:  [82-89] 84  Resp:  [16] 16  BP: (137-180)/(54-76) 137/67  General Appearance:    Alert, cooperative, no distress  Head:    Normocephalic, atraumatic  Eyes:    Sclerae anicteric  Neck:   Supple, no mass  Lungs: Clear to auscultation bilaterally, respirations unlabored  Heart: Regular rate and rhythm, S1 and S2 normal, no murmur, rub or gallop  Abdomen:  Soft, non-tender, bowel sounds active, nondistended  Extremities: No clubbing, cyanosis, or edema to lower extremities  Pulses:  2+ and symmetric in distal lower extremities  Skin: No rashes   Neurologic: Oriented x3, Normal strength to extremities    Results Review:    I have reviewed the labs, radiology results and diagnostic studies.    Results from last 7 days   Lab Units 07/06/22  0415   WBC 10*3/mm3 14.10*   HEMOGLOBIN g/dL 14.2   HEMATOCRIT % 41.3   PLATELETS 10*3/mm3 251     Results from last 7 days   Lab Units 07/06/22  0415 07/05/22  1153   SODIUM mmol/L 139 143   POTASSIUM mmol/L 3.6 3.8   CHLORIDE mmol/L 102 102    CO2 mmol/L 24.5 27.0   BUN mg/dL 24* 23   CREATININE mg/dL 0.51* 0.61   CALCIUM mg/dL 9.6 9.5   BILIRUBIN mg/dL  --  0.3   ALK PHOS U/L  --  66   ALT (SGPT) U/L  --  7   AST (SGOT) U/L  --  12   GLUCOSE mg/dL 187* 168*     Imaging Results (Last 24 Hours)     ** No results found for the last 24 hours. **          I have reviewed the medications.  ---------------------------------------------------------------------------------------------  Assessment & Plan   Assessment/Problem List    Angioedema      Plan:    Angioedema  -resolved as of this morning, hasn't taken benazepril in nearly 24 hrs  -Discontinue benazepril  -Monitor blood pressure    Diabetes  -Insulin dependent   -Accu-Checks 3 times daily with meals  -Moderate dose correctional factor with hypoglycemic protocol    Hypertension  -Stop benazepril  -Continue amlodipine and hydrochlorothiazide    Disposition: discharge home    Follow-up after Discharge: PCP    This note will serve as discharge summary.     LIAM Buchanan 07/06/22 08:19 EDT

## 2022-07-06 NOTE — DISCHARGE INSTRUCTIONS
Return to the emergency department with worsening symptoms, uncontrolled pain, inability to tolerate oral liquids, fever greater than 105°F not controlled by Tylenol and ibuprofen or as needed with emergent concerns.     You make take Benadryl as needed after discharge.

## 2022-07-06 NOTE — PLAN OF CARE
Goal Outcome Evaluation:               Patient is discharged and will ride home with . She understands medication changes and will take all belongings with her.

## 2022-07-06 NOTE — CASE MANAGEMENT/SOCIAL WORK
Discharge Planning Assessment  [facility]     Patient Name: Jessica Barnes  MRN: [id number]  Today's Date: 7/6/2022    Admit Date: 7/5/2022     Discharge Needs Assessment     Row Name 07/06/22 0923       Living Environment    People in Home spouse    Current Living Arrangements home    Primary Care Provided by self    Provides Primary Care For no one    Family Caregiver if Needed spouse    Quality of Family Relationships supportive    Able to Return to Prior Arrangements yes       Resource/Environmental Concerns    Resource/Environmental Concerns none       Transition Planning    Patient/Family Anticipates Transition to home with family    Patient/Family Anticipated Services at Transition none    Transportation Anticipated family or friend will provide       Discharge Needs Assessment    Equipment Currently Used at Home none    Concerns to be Addressed no discharge needs identified    Anticipated Changes Related to Illness none    Equipment Needed After Discharge none    Provided Post Acute Provider List? N/A    Provided Post Acute Provider Quality & Resource List? N/A               Discharge Plan     Row Name 07/06/22 0923       Plan    Plan Introduced self and explained role of CCP. PPE used.    Plan Comments Plans to return home w/ spouse at d/c. Spouse will provide transport. Independent w/ ADLs. No assistive devices used. Denies financial concerns or d/c needs    Final Discharge Disposition Code 01 - home or self-care              Continued Care and Services - Admitted Since 7/5/2022    Coordination has not been started for this encounter.       Expected Discharge Date and Time     Expected Discharge Date Expected Discharge Time    Jul 6, 2022          Demographic Summary    No documentation.                Functional Status    No documentation.                Psychosocial    No documentation.                Abuse/Neglect    No documentation.                Legal    No documentation.                Substance  Abuse    No documentation.                Patient Forms    No documentation.                   Annabelle Ramon RN

## 2022-07-10 ENCOUNTER — HOSPITAL ENCOUNTER (EMERGENCY)
Facility: HOSPITAL | Age: 67
Discharge: HOME | End: 2022-07-10
Attending: EMERGENCY MEDICINE | Admitting: EMERGENCY MEDICINE

## 2022-07-10 VITALS
HEART RATE: 89 BPM | TEMPERATURE: 97.6 F | WEIGHT: 220 LBS | BODY MASS INDEX: 35.36 KG/M2 | DIASTOLIC BLOOD PRESSURE: 67 MMHG | OXYGEN SATURATION: 94 % | HEIGHT: 66 IN | SYSTOLIC BLOOD PRESSURE: 141 MMHG | RESPIRATION RATE: 18 BRPM

## 2022-07-10 DIAGNOSIS — T78.3XXA ANGIOEDEMA, INITIAL ENCOUNTER: Primary | ICD-10-CM

## 2022-07-10 LAB
ALBUMIN SERPL-MCNC: 4 G/DL (ref 3.5–5.2)
ALBUMIN/GLOB SERPL: 1.8 G/DL
ALP SERPL-CCNC: 63 U/L (ref 39–117)
ALT SERPL W P-5'-P-CCNC: 7 U/L (ref 1–33)
ANION GAP SERPL CALCULATED.3IONS-SCNC: 14 MMOL/L (ref 5–15)
AST SERPL-CCNC: 11 U/L (ref 1–32)
BASOPHILS # BLD AUTO: 0.03 10*3/MM3 (ref 0–0.2)
BASOPHILS NFR BLD AUTO: 0.3 % (ref 0–1.5)
BILIRUB SERPL-MCNC: 0.3 MG/DL (ref 0–1.2)
BUN SERPL-MCNC: 19 MG/DL (ref 8–23)
BUN/CREAT SERPL: 38 (ref 7–25)
CALCIUM SPEC-SCNC: 9.8 MG/DL (ref 8.6–10.5)
CHLORIDE SERPL-SCNC: 100 MMOL/L (ref 98–107)
CO2 SERPL-SCNC: 27 MMOL/L (ref 22–29)
CREAT SERPL-MCNC: 0.5 MG/DL (ref 0.57–1)
DEPRECATED RDW RBC AUTO: 41.3 FL (ref 37–54)
EGFRCR SERPLBLD CKD-EPI 2021: 102.9 ML/MIN/1.73
EOSINOPHIL # BLD AUTO: 0.18 10*3/MM3 (ref 0–0.4)
EOSINOPHIL NFR BLD AUTO: 1.7 % (ref 0.3–6.2)
ERYTHROCYTE [DISTWIDTH] IN BLOOD BY AUTOMATED COUNT: 13.4 % (ref 12.3–15.4)
GLOBULIN UR ELPH-MCNC: 2.2 GM/DL
GLUCOSE SERPL-MCNC: 147 MG/DL (ref 65–99)
HCT VFR BLD AUTO: 43.7 % (ref 34–46.6)
HGB BLD-MCNC: 14.2 G/DL (ref 12–15.9)
IMM GRANULOCYTES # BLD AUTO: 0.08 10*3/MM3 (ref 0–0.05)
IMM GRANULOCYTES NFR BLD AUTO: 0.7 % (ref 0–0.5)
LYMPHOCYTES # BLD AUTO: 2.18 10*3/MM3 (ref 0.7–3.1)
LYMPHOCYTES NFR BLD AUTO: 20 % (ref 19.6–45.3)
MCH RBC QN AUTO: 27.7 PG (ref 26.6–33)
MCHC RBC AUTO-ENTMCNC: 32.5 G/DL (ref 31.5–35.7)
MCV RBC AUTO: 85.4 FL (ref 79–97)
MONOCYTES # BLD AUTO: 0.63 10*3/MM3 (ref 0.1–0.9)
MONOCYTES NFR BLD AUTO: 5.8 % (ref 5–12)
NEUTROPHILS NFR BLD AUTO: 7.78 10*3/MM3 (ref 1.7–7)
NEUTROPHILS NFR BLD AUTO: 71.5 % (ref 42.7–76)
NRBC BLD AUTO-RTO: 0 /100 WBC (ref 0–0.2)
PLATELET # BLD AUTO: 204 10*3/MM3 (ref 140–450)
PMV BLD AUTO: 10.7 FL (ref 6–12)
POTASSIUM SERPL-SCNC: 3.4 MMOL/L (ref 3.5–5.2)
PROT SERPL-MCNC: 6.2 G/DL (ref 6–8.5)
RBC # BLD AUTO: 5.12 10*6/MM3 (ref 3.77–5.28)
SODIUM SERPL-SCNC: 141 MMOL/L (ref 136–145)
WBC NRBC COR # BLD: 10.88 10*3/MM3 (ref 3.4–10.8)

## 2022-07-10 PROCEDURE — 25010000002 DIPHENHYDRAMINE PER 50 MG: Performed by: EMERGENCY MEDICINE

## 2022-07-10 PROCEDURE — 25010000002 METHYLPREDNISOLONE PER 125 MG: Performed by: EMERGENCY MEDICINE

## 2022-07-10 PROCEDURE — 36415 COLL VENOUS BLD VENIPUNCTURE: CPT

## 2022-07-10 PROCEDURE — 96375 TX/PRO/DX INJ NEW DRUG ADDON: CPT

## 2022-07-10 PROCEDURE — 80053 COMPREHEN METABOLIC PANEL: CPT | Performed by: EMERGENCY MEDICINE

## 2022-07-10 PROCEDURE — 85025 COMPLETE CBC W/AUTO DIFF WBC: CPT | Performed by: EMERGENCY MEDICINE

## 2022-07-10 PROCEDURE — 96374 THER/PROPH/DIAG INJ IV PUSH: CPT

## 2022-07-10 PROCEDURE — 99283 EMERGENCY DEPT VISIT LOW MDM: CPT

## 2022-07-10 RX ORDER — SODIUM CHLORIDE 0.9 % (FLUSH) 0.9 %
10 SYRINGE (ML) INJECTION AS NEEDED
Status: DISCONTINUED | OUTPATIENT
Start: 2022-07-10 | End: 2022-07-10 | Stop reason: HOSPADM

## 2022-07-10 RX ORDER — DIPHENHYDRAMINE HYDROCHLORIDE 50 MG/ML
25 INJECTION INTRAMUSCULAR; INTRAVENOUS ONCE
Status: COMPLETED | OUTPATIENT
Start: 2022-07-10 | End: 2022-07-10

## 2022-07-10 RX ORDER — FAMOTIDINE 10 MG/ML
20 INJECTION, SOLUTION INTRAVENOUS ONCE
Status: COMPLETED | OUTPATIENT
Start: 2022-07-10 | End: 2022-07-10

## 2022-07-10 RX ORDER — METHYLPREDNISOLONE SODIUM SUCCINATE 125 MG/2ML
125 INJECTION, POWDER, LYOPHILIZED, FOR SOLUTION INTRAMUSCULAR; INTRAVENOUS ONCE
Status: COMPLETED | OUTPATIENT
Start: 2022-07-10 | End: 2022-07-10

## 2022-07-10 RX ADMIN — SODIUM CHLORIDE 500 ML: 9 INJECTION, SOLUTION INTRAVENOUS at 07:39

## 2022-07-10 RX ADMIN — FAMOTIDINE 20 MG: 10 INJECTION INTRAVENOUS at 07:39

## 2022-07-10 RX ADMIN — DIPHENHYDRAMINE HYDROCHLORIDE 25 MG: 50 INJECTION, SOLUTION INTRAMUSCULAR; INTRAVENOUS at 07:40

## 2022-07-10 RX ADMIN — METHYLPREDNISOLONE SODIUM SUCCINATE 125 MG: 125 INJECTION, POWDER, FOR SOLUTION INTRAMUSCULAR; INTRAVENOUS at 07:39

## 2022-07-10 NOTE — ED NOTES
Pt arrived by private vehicle reporting recently being seen for swelling of her throat from lisinopril, pt states she was over night and discharged, pt is back for same today. Pt is ambulatory at triage

## 2022-07-10 NOTE — ED PROVIDER NOTES
EMERGENCY DEPARTMENT ENCOUNTER    CHIEF COMPLAINT  Chief Complaint: Swelling tongue  History given by: Patient   History limited by: Nothing  Room Number: 10/10  PMD: Kadeem Arteaga Jr., MD      HPI:  Pt is a 66 y.o. female with recent history of observation for angioedema presents complaining of swelling of her tongue.  Patient reports she woke at 5 AM noticing swelling of the right side of her tongue and reports that swelling seems to be somewhat worse than at the time she woke.  Patient denies rash, itching, reports she feels some difficulty swallowing secondary to globus sensation.  Patient denies shortness of air, chest pain, cough, fever, abdominal pain, nausea/vomiting.  Patient reports she has discontinued her benazepril since the initial episode of angioedema 5 days prior to arrival.  Patient denies any new food/hygiene contacts.    Duration: 2 hours  Associated Symptoms: Difficulty swallowing  Aggravating Factors: Unknown  Alleviating Factors: Nothing  Treatment before arrival: Nothing    PAST MEDICAL HISTORY  Active Ambulatory Problems     Diagnosis Date Noted   • Small bowel obstruction (HCC) 02/11/2021   • Closed fracture of left proximal humerus 10/06/2021   • Closed fracture of left distal fibula 10/06/2021   • Diabetes mellitus (HCC)    • Hypertension    • BMI 40.0-44.9, adult (HCC)    • Hyponatremia 10/09/2021   • Drug-induced constipation 10/09/2021   • Angioedema 07/05/2022     Resolved Ambulatory Problems     Diagnosis Date Noted   • No Resolved Ambulatory Problems     Past Medical History:   Diagnosis Date   • Cataract    • Hyperlipemia    • Osteoarthritis of knee, unilateral    • PONV (postoperative nausea and vomiting)    • Torn ligament        PAST SURGICAL HISTORY  Past Surgical History:   Procedure Laterality Date   • ANKLE OPEN REDUCTION INTERNAL FIXATION Left 10/13/2021    Procedure: ANKLE OPEN REDUCTION INTERNAL FIXATION;  Surgeon: Jacinto Orta Jr., MD;  Location: [facility] OR;   Service: Orthopedics;  Laterality: Left;   •  SECTION     • CHOLECYSTECTOMY     • EXPLORATORY LAPAROTOMY N/A 2021    Procedure: LAPAROTOMY EXPLORATORY FOR BOWEL OBSTRUCTION,LYSIS OF ADHESIONS,PRIMARY VENTRAL HERNIA REPAIR;  Surgeon: Alie Addison MD;  Location: [facility];  Service: General;  Laterality: N/A;   • KNEE ARTHROSCOPY     • TOTAL SHOULDER ARTHROPLASTY W/ DISTAL CLAVICLE EXCISION Left 10/13/2021    Procedure: LEFT TOTAL SHOULDER REVERSE ARTHROPLASTY;  Surgeon: Patrick Ramos MD;  Location: [facility];  Service: Orthopedics;  Laterality: Left;       FAMILY HISTORY  No family history on file.    SOCIAL HISTORY  Social History     Socioeconomic History   • Marital status:    Tobacco Use   • Smoking status: Never Smoker   • Smokeless tobacco: Never Used   Vaping Use   • Vaping Use: Never used   Substance and Sexual Activity   • Alcohol use: No   • Drug use: No   • Sexual activity: Defer       ALLERGIES  Latex, Penicillins, and Sulfa antibiotics    REVIEW OF SYSTEMS  Review of Systems   Constitutional: Negative for chills and fever.   HENT: Positive for trouble swallowing. Negative for rhinorrhea and sore throat.         Tongue swelling   Eyes: Negative for visual disturbance.   Respiratory: Negative for cough and shortness of breath.    Cardiovascular: Negative for chest pain, palpitations and leg swelling.   Gastrointestinal: Negative for abdominal pain, diarrhea and vomiting.   Endocrine: Negative.    Genitourinary: Negative for decreased urine volume, dysuria and frequency.   Musculoskeletal: Negative for neck pain.   Skin: Negative for rash.   Neurological: Negative for syncope and headaches.   Psychiatric/Behavioral: Negative.    All other systems reviewed and are negative.      PHYSICAL EXAM  ED Triage Vitals [07/10/22 0631]   Temp Heart Rate Resp BP SpO2   97.6 °F (36.4 °C) 100 18 (!) 188/88 98 %      Temp src Heart Rate Source Patient Position BP Location FiO2 (%)    Tympanic Monitor Sitting Right arm --       Physical Exam  Vitals and nursing note reviewed.   Constitutional:       General: She is in acute distress (mild).      Appearance: She is not toxic-appearing.      Comments: Voice normal   HENT:      Head: Normocephalic and atraumatic.      Mouth/Throat:      Comments: Patient with slightly more prominent size of the right side of her tongue versus the left, Mallampati class III  Cardiovascular:      Rate and Rhythm: Normal rate and regular rhythm.      Pulses:           Posterior tibial pulses are 2+ on the right side and 2+ on the left side.      Heart sounds: Normal heart sounds. No murmur heard.  Pulmonary:      Effort: Pulmonary effort is normal. No respiratory distress.      Breath sounds: Normal breath sounds.   Abdominal:      General: Bowel sounds are normal.      Palpations: Abdomen is soft.      Tenderness: There is no abdominal tenderness. There is no guarding or rebound.   Musculoskeletal:         General: Normal range of motion.      Cervical back: Normal range of motion and neck supple.   Skin:     General: Skin is warm and dry.   Neurological:      Mental Status: She is alert and oriented to person, place, and time.   Psychiatric:         Mood and Affect: Affect normal.         LAB RESULTS  Lab Results (last 24 hours)     Procedure Component Value Units Date/Time    CBC & Differential [[id number]]  (Abnormal) Collected: 07/10/22 0717    Specimen: Blood Updated: 07/10/22 0731    Narrative:      The following orders were created for panel order CBC & Differential.  Procedure                               Abnormality         Status                     ---------                               -----------         ------                     CBC Auto Differential[[id number]]        Abnormal            Final result                 Please view results for these tests on the individual orders.    CBC Auto Differential [[id number]]  (Abnormal) Collected: 07/10/22 0717     Specimen: Blood Updated: 07/10/22 0731     WBC 10.88 10*3/mm3      RBC 5.12 10*6/mm3      Hemoglobin 14.2 g/dL      Hematocrit 43.7 %      MCV 85.4 fL      MCH 27.7 pg      MCHC 32.5 g/dL      RDW 13.4 %      RDW-SD 41.3 fl      MPV 10.7 fL      Platelets 204 10*3/mm3      Neutrophil % 71.5 %      Lymphocyte % 20.0 %      Monocyte % 5.8 %      Eosinophil % 1.7 %      Basophil % 0.3 %      Immature Grans % 0.7 %      Neutrophils, Absolute 7.78 10*3/mm3      Lymphocytes, Absolute 2.18 10*3/mm3      Monocytes, Absolute 0.63 10*3/mm3      Eosinophils, Absolute 0.18 10*3/mm3      Basophils, Absolute 0.03 10*3/mm3      Immature Grans, Absolute 0.08 10*3/mm3      nRBC 0.0 /100 WBC     Comprehensive Metabolic Panel [[id number]]  (Abnormal) Collected: 07/10/22 0840    Specimen: Blood Updated: 07/10/22 0917     Glucose 147 mg/dL      BUN 19 mg/dL      Creatinine 0.50 mg/dL      Sodium 141 mmol/L      Potassium 3.4 mmol/L      Chloride 100 mmol/L      CO2 27.0 mmol/L      Calcium 9.8 mg/dL      Total Protein 6.2 g/dL      Albumin 4.00 g/dL      ALT (SGPT) 7 U/L      AST (SGOT) 11 U/L      Alkaline Phosphatase 63 U/L      Total Bilirubin 0.3 mg/dL      Globulin 2.2 gm/dL      A/G Ratio 1.8 g/dL      BUN/Creatinine Ratio 38.0     Anion Gap 14.0 mmol/L      eGFR 102.9 mL/min/1.73      Comment: National Kidney Foundation and American Society of Nephrology (ASN) Task Force recommended calculation based on the Chronic Kidney Disease Epidemiology Collaboration (CKD-EPI) equation refit without adjustment for race.       Narrative:      GFR Normal >60  Chronic Kidney Disease <60  Kidney Failure <15            I ordered the above labs and reviewed the results    RADIOLOGY  No orders to display        I ordered the above noted radiological studies. Interpreted by radiologist. Viewed by me in PACS.       PROCEDURES  Procedures      PROGRESS AND CONSULTS  ED Course as of 07/10/22 1648   Sun Jul 10, 2022   0820 Patient seen and  reexamined, voice normal, right-sided tongue slightly more prominent than left, unchanged from previous exam, able to see partial view of posterior oropharynx on oral exam.  Patient denies shortness of air or increased difficulty swallowing.  Patient reports she feels same as when she arrived [TO]   0925 Patient seen and reexamined, voice normal, reports she feels her tongue is similar to previous, denies difficulty swallowing.  Right side of tongue noted to be slightly more prominent than left, no rash, no itching, able to visualize part of oropharynx similar to previous [TO]   1045 Patient seen and reexamined, voice normal reports her tongue feels improved, voices understanding of need to follow with allergist for further testing, treatment for potential angioedema versus allergy [TO]   1205 Patient tolerating p.o. well, now back to normal, oropharynx within normal limits, voice normal, aware of need to return to the ER immediately with increased swelling of her tongue, difficulty swallowing, breathing or other concerns. [TO]      ED Course User Index  [TO] Naina Philippe MD           MEDICAL DECISION MAKING  Results were reviewed/discussed with the patient and they were also made aware of online access. Pt also made aware that some labs, such as cultures, will not be resulted during ER visit and followup with PMD is necessary.       MDM       DIAGNOSIS  Final diagnoses:   Angioedema, initial encounter       DISPOSITION  DISCHARGE    Patient discharged in stable condition.    Reviewed implications of results, diagnosis, meds, responsibility to follow up, warning signs and symptoms of possible worsening, potential complications and reasons to return to ER    Patient/Family voiced understanding of above instructions.    Discussed plan for discharge, as there is no emergent indication for admission. Patient referred to primary care provider for BP management due to today's BP. Pt/family is agreeable and understands  need for follow up and repeat testing.  Pt is aware that discharge does not mean that nothing is wrong but it indicates no emergency is present that requires admission and they must continue care with follow-up as given below or physician of their choice.     FOLLOW-UP  Kadeem Arteaga Jr., MD  [address] [address]  [phone]    Schedule an appointment as soon as possible for a visit in 3 days  EVEN IF WELL         Medication List      Changed    polyethylene glycol 17 g packet  Commonly known as: MIRALAX  Take 17 g by mouth 2 (Two) Times a Day.  What changed: when to take this              Latest Documented Vital Signs:  As of 16:48 EDT  BP- 141/67 HR- 89 Temp- 97.6 °F (36.4 °C) (Tympanic) O2 sat- 94%    --  Patient was wearing facemask when I entered the room and throughout our encounter. Full protective equipment was worn throughout this patient encounter including a face mask, eye protection and gloves. Hand hygiene was performed before donning protective equipment and after removal when leaving the room.      Naina Philippe MD  07/10/22 [id number]

## 2022-07-10 NOTE — DISCHARGE INSTRUCTIONS
You are advised to follow closely with Dr. Arteaga in 2-3 days for recheck, final results of lab work and imaging testing, and further testing/treatment as needed.    You are encouraged to follow with an allergist to discuss recent history of angioedema and potential need for further testing, treatment/referral as needed    Please return to the emergency department immediately with chest pain different than usual for you, shortness of air, abdominal pain, persistent vomiting/fever, blood in emesis or stool, lightheadedness/fainting, problems with speech, one sided weakness/numbness, new incontinence, problems with vision, difficulty swallowing, swelling of your mouth or throat, or for worsening of symptoms or other concerns.

## 2022-08-31 ENCOUNTER — HOSPITAL ENCOUNTER (EMERGENCY)
Facility: HOSPITAL | Age: 67
Discharge: HOME | End: 2022-08-31
Attending: EMERGENCY MEDICINE | Admitting: EMERGENCY MEDICINE

## 2022-08-31 VITALS
SYSTOLIC BLOOD PRESSURE: 126 MMHG | HEART RATE: 61 BPM | DIASTOLIC BLOOD PRESSURE: 53 MMHG | OXYGEN SATURATION: 94 % | RESPIRATION RATE: 18 BRPM | TEMPERATURE: 97.5 F

## 2022-08-31 DIAGNOSIS — Z91.018 HX OF ALLERGY TO NUTS: ICD-10-CM

## 2022-08-31 DIAGNOSIS — R22.0 MILD TONGUE SWELLING: Primary | ICD-10-CM

## 2022-08-31 PROCEDURE — 99282 EMERGENCY DEPT VISIT SF MDM: CPT

## 2022-08-31 PROCEDURE — 25010000002 DIPHENHYDRAMINE PER 50 MG: Performed by: EMERGENCY MEDICINE

## 2022-08-31 PROCEDURE — 25010000002 EPINEPHRINE PER 0.1 MG: Performed by: EMERGENCY MEDICINE

## 2022-08-31 PROCEDURE — 25010000002 METHYLPREDNISOLONE PER 125 MG: Performed by: EMERGENCY MEDICINE

## 2022-08-31 PROCEDURE — 96375 TX/PRO/DX INJ NEW DRUG ADDON: CPT

## 2022-08-31 PROCEDURE — 96372 THER/PROPH/DIAG INJ SC/IM: CPT

## 2022-08-31 PROCEDURE — 96374 THER/PROPH/DIAG INJ IV PUSH: CPT

## 2022-08-31 RX ORDER — METHYLPREDNISOLONE SODIUM SUCCINATE 125 MG/2ML
125 INJECTION, POWDER, LYOPHILIZED, FOR SOLUTION INTRAMUSCULAR; INTRAVENOUS ONCE
Status: COMPLETED | OUTPATIENT
Start: 2022-08-31 | End: 2022-08-31

## 2022-08-31 RX ORDER — FAMOTIDINE 10 MG/ML
20 INJECTION, SOLUTION INTRAVENOUS ONCE
Status: COMPLETED | OUTPATIENT
Start: 2022-08-31 | End: 2022-08-31

## 2022-08-31 RX ORDER — SODIUM CHLORIDE 0.9 % (FLUSH) 0.9 %
10 SYRINGE (ML) INJECTION AS NEEDED
Status: DISCONTINUED | OUTPATIENT
Start: 2022-08-31 | End: 2022-08-31 | Stop reason: HOSPADM

## 2022-08-31 RX ORDER — DIPHENHYDRAMINE HYDROCHLORIDE 50 MG/ML
25 INJECTION INTRAMUSCULAR; INTRAVENOUS ONCE
Status: COMPLETED | OUTPATIENT
Start: 2022-08-31 | End: 2022-08-31

## 2022-08-31 RX ORDER — NEBIVOLOL 2.5 MG/1
2.5 TABLET ORAL DAILY
COMMUNITY

## 2022-08-31 RX ORDER — EPINEPHRINE 0.3 MG/.3ML
0.3 INJECTION SUBCUTANEOUS ONCE AS NEEDED
Qty: 2 EACH | Refills: 0 | Status: SHIPPED | OUTPATIENT
Start: 2022-08-31

## 2022-08-31 RX ADMIN — EPINEPHRINE 0.3 MG: 1 INJECTION, SOLUTION, CONCENTRATE INTRAVENOUS at 10:04

## 2022-08-31 RX ADMIN — DIPHENHYDRAMINE HYDROCHLORIDE 25 MG: 50 INJECTION INTRAMUSCULAR; INTRAVENOUS at 10:01

## 2022-08-31 RX ADMIN — METHYLPREDNISOLONE SODIUM SUCCINATE 125 MG: 125 INJECTION, POWDER, FOR SOLUTION INTRAMUSCULAR; INTRAVENOUS at 10:01

## 2022-08-31 RX ADMIN — FAMOTIDINE 20 MG: 10 INJECTION, SOLUTION INTRAVENOUS at 10:01

## 2022-09-10 ENCOUNTER — HOSPITAL ENCOUNTER (EMERGENCY)
Facility: HOSPITAL | Age: 67
Discharge: HOME | End: 2022-09-10
Attending: EMERGENCY MEDICINE | Admitting: EMERGENCY MEDICINE

## 2022-09-10 VITALS
DIASTOLIC BLOOD PRESSURE: 54 MMHG | OXYGEN SATURATION: 96 % | RESPIRATION RATE: 18 BRPM | SYSTOLIC BLOOD PRESSURE: 141 MMHG | HEART RATE: 66 BPM | TEMPERATURE: 97.7 F

## 2022-09-10 DIAGNOSIS — T78.3XXA ANGIOEDEMA OF TONGUE: Primary | ICD-10-CM

## 2022-09-10 LAB — C4 SERPL-MCNC: 30 MG/DL (ref 14–44)

## 2022-09-10 PROCEDURE — 96375 TX/PRO/DX INJ NEW DRUG ADDON: CPT

## 2022-09-10 PROCEDURE — 25010000002 DIPHENHYDRAMINE PER 50 MG: Performed by: EMERGENCY MEDICINE

## 2022-09-10 PROCEDURE — 86160 COMPLEMENT ANTIGEN: CPT | Performed by: EMERGENCY MEDICINE

## 2022-09-10 PROCEDURE — 25010000002 METHYLPREDNISOLONE PER 125 MG: Performed by: EMERGENCY MEDICINE

## 2022-09-10 PROCEDURE — 99283 EMERGENCY DEPT VISIT LOW MDM: CPT

## 2022-09-10 PROCEDURE — 96374 THER/PROPH/DIAG INJ IV PUSH: CPT

## 2022-09-10 RX ORDER — METHYLPREDNISOLONE SODIUM SUCCINATE 125 MG/2ML
125 INJECTION, POWDER, LYOPHILIZED, FOR SOLUTION INTRAMUSCULAR; INTRAVENOUS ONCE
Status: COMPLETED | OUTPATIENT
Start: 2022-09-10 | End: 2022-09-10

## 2022-09-10 RX ORDER — DIPHENHYDRAMINE HYDROCHLORIDE 50 MG/ML
25 INJECTION INTRAMUSCULAR; INTRAVENOUS ONCE
Status: COMPLETED | OUTPATIENT
Start: 2022-09-10 | End: 2022-09-10

## 2022-09-10 RX ORDER — PREDNISONE 50 MG/1
50 TABLET ORAL DAILY
Qty: 5 TABLET | Refills: 0 | Status: SHIPPED | OUTPATIENT
Start: 2022-09-10 | End: 2022-09-15

## 2022-09-10 RX ORDER — SODIUM CHLORIDE 0.9 % (FLUSH) 0.9 %
10 SYRINGE (ML) INJECTION AS NEEDED
Status: DISCONTINUED | OUTPATIENT
Start: 2022-09-10 | End: 2022-09-10 | Stop reason: HOSPADM

## 2022-09-10 RX ADMIN — METHYLPREDNISOLONE SODIUM SUCCINATE 125 MG: 125 INJECTION, POWDER, FOR SOLUTION INTRAMUSCULAR; INTRAVENOUS at 11:31

## 2022-09-10 RX ADMIN — DIPHENHYDRAMINE HYDROCHLORIDE 25 MG: 50 INJECTION INTRAMUSCULAR; INTRAVENOUS at 11:30

## 2022-09-10 NOTE — ED TRIAGE NOTES
Pt reports her tongue started swelling this morning around 0730.     Pt was wearing a mask during assessment.  This RN wore appropriate PPE

## 2022-09-10 NOTE — ED PROVIDER NOTES
EMERGENCY DEPARTMENT ENCOUNTER    Room Number:    Date of encounter:  9/10/2022  PCP: Kadeem Arteaga Jr., MD  Historian: Patient, family      HPI:  Chief Complaint: Tongue swelling  A complete HPI/ROS/PMH/PSH/SH/FH are unobtainable due to: None    Context: Jessica Barnes is a 66 y.o. female who presents to the ED c/o tongue swelling.  Most recent episode begins at 7:30 AM today when she woke up.  Symptoms are constant.  He makes this worse or better.  No associated rash.  No difficulty breathing.  She reports this is her fourth episode since 2022.  On 2022, patient was on ACE inhibitor at that time.  She had recurrence on 7/10 and again on .  However, she has been off of NSAIDs and ACE inhibitor's for at least 1 month now.  She feels that she tends to respond to being on steroids and Benadryl although takes several hours for her to have improvement.  She states that she has never had a rash associated with the swelling.      PAST MEDICAL HISTORY  Active Ambulatory Problems     Diagnosis Date Noted   • Small bowel obstruction (HCC) 2021   • Closed fracture of left proximal humerus 10/06/2021   • Closed fracture of left distal fibula 10/06/2021   • Diabetes mellitus (HCC)    • Hypertension    • BMI 40.0-44.9, adult ([facility])    • Hyponatremia 10/09/2021   • Drug-induced constipation 10/09/2021   • Angioedema 2022     Resolved Ambulatory Problems     Diagnosis Date Noted   • No Resolved Ambulatory Problems     Past Medical History:   Diagnosis Date   • Cataract    • Hyperlipemia    • Osteoarthritis of knee, unilateral    • PONV (postoperative nausea and vomiting)    • Torn ligament          PAST SURGICAL HISTORY  Past Surgical History:   Procedure Laterality Date   • ANKLE OPEN REDUCTION INTERNAL FIXATION Left 10/13/2021    Procedure: ANKLE OPEN REDUCTION INTERNAL FIXATION;  Surgeon: Jacinto Orta Jr., MD;  Location: [facility];  Service: Orthopedics;  Laterality: Left;   •   SECTION     • CHOLECYSTECTOMY     • EXPLORATORY LAPAROTOMY N/A 2/12/2021    Procedure: LAPAROTOMY EXPLORATORY FOR BOWEL OBSTRUCTION,LYSIS OF ADHESIONS,PRIMARY VENTRAL HERNIA REPAIR;  Surgeon: Alie Addison MD;  Location: [facility];  Service: General;  Laterality: N/A;   • KNEE ARTHROSCOPY     • TOTAL SHOULDER ARTHROPLASTY W/ DISTAL CLAVICLE EXCISION Left 10/13/2021    Procedure: LEFT TOTAL SHOULDER REVERSE ARTHROPLASTY;  Surgeon: Patrick Ramos MD;  Location: [facility];  Service: Orthopedics;  Laterality: Left;         FAMILY HISTORY  No family history on file.      SOCIAL HISTORY  Social History     Socioeconomic History   • Marital status:    Tobacco Use   • Smoking status: Never Smoker   • Smokeless tobacco: Never Used   Vaping Use   • Vaping Use: Never used   Substance and Sexual Activity   • Alcohol use: No   • Drug use: No   • Sexual activity: Defer         ALLERGIES  Latex, Penicillins, and Sulfa antibiotics        REVIEW OF SYSTEMS  Review of Systems     All systems reviewed and negative except for those discussed in HPI.       PHYSICAL EXAM    I have reviewed the triage vital signs and nursing notes.    ED Triage Vitals   Temp Heart Rate Resp BP SpO2   09/10/22 1040 09/10/22 1040 09/10/22 1040 09/10/22 1100 09/10/22 1040   97.7 °F (36.5 °C) 78 16 147/68 98 %      Temp src Heart Rate Source Patient Position BP Location FiO2 (%)   09/10/22 1040 09/10/22 1040 09/10/22 1100 09/10/22 1100 --   Tympanic Monitor Lying Left arm        Physical Exam  GENERAL: not distressed  HENT: nares patent, tongue swelling that is more prominent on the right side, no uvula swelling, normal phonation  EYES: no scleral icterus  CV: regular rhythm, regular rate  RESPIRATORY: normal effort, clear to auscultation bilaterally  ABDOMEN: soft, nontender  MUSCULOSKELETAL: no deformity  NEURO: alert, moves all extremities, follows commands  SKIN: warm, dry        LAB RESULTS  No results found for this or any previous  visit (from the past 24 hour(s)).    Ordered the above labs and independently reviewed the results.        RADIOLOGY  No Radiology Exams Resulted Within Past 24 Hours    I ordered the above noted radiological studies. Reviewed by me and discussed with radiologist.  See dictation for official radiology interpretation.      PROCEDURES    Procedures      MEDICATIONS GIVEN IN ER    Medications   sodium chloride 0.9 % flush 10 mL (has no administration in time range)   methylPREDNISolone sodium succinate (SOLU-Medrol) injection 125 mg (125 mg Intravenous Given 9/10/22 1131)   diphenhydrAMINE (BENADRYL) injection 25 mg (25 mg Intravenous Given 9/10/22 1130)         PROGRESS, DATA ANALYSIS, CONSULTS, AND MEDICAL DECISION MAKING    All labs have been independently reviewed by me.  All radiology studies have been reviewed by me and discussed with radiologist dictating the report.   EKG's independently viewed and interpreted by me.  Discussion below represents my analysis of pertinent findings related to patient's condition, differential diagnosis, treatment plan and final disposition.    Differential diagnosis includes medication induced angioedema, anaphylaxis, hereditary angioedema.    Medical chart review: Patient was most recently seen in the emergency department on 8/31/2022.  She was seen by Dr. Zavala.  Patient was discharged home with an EpiPen.  Patient still has the same EpiPen that she was prescribed.    I obtained additional information from the patient's family member at bedside.    Given this is the fourth episode with no associated rash and the patient has been off of common culprit medications, I have ordered complement C4 and C1 inhibitor function protein testing.  I believe that she needs an outpatient evaluation with an allergist.    Labs are pending as these will not result in the emergency department.    Patient given IV steroids and Benadryl in the emergency department.  I discharged her home with  steroids.     I closely monitor her for 2 hours to ensure clinical stability.                PPE: The patient wore a surgical mask throughout the entire patient encounter. I wore an N95.    AS OF 12:53 EDT VITALS:    BP - 140/55  HR - 62  TEMP - 97.7 °F (36.5 °C) (Tympanic)  O2 SATS - 93%        DIAGNOSIS  Final diagnoses:   Angioedema of tongue         DISPOSITION  DISCHARGE    FOLLOW-UP  Kadeem Arteaga Jr., MD  [address]  [facility] [id number] [phone]    Schedule an appointment as soon as possible for a visit   As needed    [facility] [address] [address]  [phone]        [facility]  [address]  [facility] [address] [phone]             Medication List      New Prescriptions    predniSONE 50 MG tablet  Commonly known as: DELTASONE  Take 1 tablet by mouth Daily for 5 days.        Changed    polyethylene glycol 17 g packet  Commonly known as: MIRALAX  Take 17 g by mouth 2 (Two) Times a Day.  What changed: when to take this           Where to Get Your Medications      These medications were sent to [facility] - [place] [address]    Hours: 7:00 AM-6:00 PM Mon-Fri, 8:00 AM-4:30 PM Sat-Sun (Closed 12-12:30PM) Phone: [phone]   · predniSONE 50 MG tablet                  Jose Barnett II, MD  09/10/22 1257

## 2022-09-14 LAB — C1INH SERPL-MCNC: 41 MG/DL (ref 21–39)

## 2024-01-04 ENCOUNTER — HOSPITAL ENCOUNTER (OUTPATIENT)
Dept: PET IMAGING | Facility: HOSPITAL | Age: 69
Discharge: HOME | End: 2024-01-04
Payer: MEDICARE

## 2024-01-04 ENCOUNTER — TRANSCRIBE ORDERS (OUTPATIENT)
Dept: PET IMAGING | Facility: HOSPITAL | Age: 69
End: 2024-01-04
Payer: MEDICARE

## 2024-01-04 ENCOUNTER — APPOINTMENT (OUTPATIENT)
Dept: WOMENS IMAGING | Facility: HOSPITAL | Age: 69
End: 2024-01-04
Payer: MEDICARE

## 2024-01-04 DIAGNOSIS — M81.0 HIGH RISK FOR FRACTURE DUE TO OSTEOPOROSIS BY DEXA SCAN: Primary | ICD-10-CM

## 2024-01-04 PROCEDURE — 77080 DXA BONE DENSITY AXIAL: CPT

## 2024-01-04 PROCEDURE — 77080 DXA BONE DENSITY AXIAL: CPT | Performed by: RADIOLOGY

## 2024-01-04 PROCEDURE — 77063 BREAST TOMOSYNTHESIS BI: CPT | Performed by: RADIOLOGY

## 2024-01-04 PROCEDURE — 77067 SCR MAMMO BI INCL CAD: CPT | Performed by: RADIOLOGY

## (undated) DEVICE — PIN, TEMP FIX, SMOOTH, 1.1 X 15MM: Brand: PENDING

## (undated) DEVICE — ARM SLING: Brand: DEROYAL

## (undated) DEVICE — GLV SURG SIGNATURE ESSENTIAL PF LTX SZ8

## (undated) DEVICE — DRILL BIT, SOLID CORE, 2.8MM: Brand: MEDLINE

## (undated) DEVICE — SCREW, CANCELLOUS, 4.0 X 14MM
Type: IMPLANTABLE DEVICE | Site: ANKLE | Status: NON-FUNCTIONAL
Brand: PENDING
Removed: 2021-10-13

## (undated) DEVICE — PREP SOL POVIDONE/IODINE BT 4OZ

## (undated) DEVICE — GOWN,REINF,POLY,SIRUS,BREATH SLV,XLNG/XL: Brand: MEDLINE

## (undated) DEVICE — PK ORTHO MINOR TOWER 40

## (undated) DEVICE — APPL CHLORAPREP HI/LITE 26ML ORNG

## (undated) DEVICE — COVER,TABLE,44X90,STERILE: Brand: MEDLINE

## (undated) DEVICE — ANTIBACTERIAL UNDYED BRAIDED (POLYGLACTIN 910), SYNTHETIC ABSORBABLE SUTURE: Brand: COATED VICRYL

## (undated) DEVICE — HANDPIECE SET WITH COAXIAL HIGH FLOW TIP AND SUCTION TUBE: Brand: INTERPULSE

## (undated) DEVICE — BNDG ELAS ELITE V/CLOSE 6IN 5YD LF STRL

## (undated) DEVICE — SUT ETHIB 2 CV V37 MS/4 30IN MX69G

## (undated) DEVICE — SYR SLP TP 10ML DISP

## (undated) DEVICE — DRP C/ARM 41X74IN

## (undated) DEVICE — SUT PDS 0 CT 36IN DYED Z358T

## (undated) DEVICE — JACKSON-PRATT 100CC BULB RESERVOIR: Brand: CARDINAL HEALTH

## (undated) DEVICE — SOL ISO/ALC RUB 70PCT 4OZ

## (undated) DEVICE — PK SHLDR OPN 40

## (undated) DEVICE — GLV SURG BIOGEL LTX PF 6 1/2

## (undated) DEVICE — UNDERCAST PADDING: Brand: DEROYAL

## (undated) DEVICE — TRAP FLD MINIVAC MEGADYNE 100ML

## (undated) DEVICE — STCKNT IMPERV 12IN STRL

## (undated) DEVICE — PK PROC MAJ 40

## (undated) DEVICE — SPNG GZ WOVN 4X4IN 12PLY 10/BX STRL

## (undated) DEVICE — SHEET, DRAPE, SPLIT, STERILE: Brand: MEDLINE

## (undated) DEVICE — MARKR SKIN W/RULR AND LBL

## (undated) DEVICE — PREP IM ENCHANCED TOTAL HIP BONE                                    PREPARATION KIT: Brand: PREP-IM

## (undated) DEVICE — PAD,ABDOMINAL,8"X10",ST,LF: Brand: MEDLINE

## (undated) DEVICE — BNDG ELAS CO-FLEX SLF ADHR 6IN 5YD LF STRL

## (undated) DEVICE — TOWEL,OR,DSP,ST,BLUE,STD,4/PK,20PK/CS: Brand: MEDLINE

## (undated) DEVICE — GLV SURG PREMIERPRO ORTHO LTX PF SZ8 BRN

## (undated) DEVICE — SUT ETHLN 3/0 PS1 18IN 1663H

## (undated) DEVICE — SUT VIC 2/0 X1 27IN J459H

## (undated) DEVICE — K-LESS T-ROPE W/DRV, SYN REPR, TI
Type: IMPLANTABLE DEVICE | Site: ANKLE | Status: NON-FUNCTIONAL
Brand: ARTHREX®
Removed: 2021-10-13

## (undated) DEVICE — TOTAL TRAY, 16FR 10ML SIL FOLEY, URN: Brand: MEDLINE

## (undated) DEVICE — POOLE SUCTION HANDLE: Brand: CARDINAL HEALTH

## (undated) DEVICE — DRILL BIT, SOLID CORE, 3.5MM: Brand: PENDING

## (undated) DEVICE — PATIENT RETURN ELECTRODE, SINGLE-USE, CONTACT QUALITY MONITORING, ADULT, WITH 9FT CORD, FOR PATIENTS WEIGING OVER 33LBS. (15KG): Brand: MEGADYNE

## (undated) DEVICE — INTENDED FOR TISSUE SEPARATION, AND OTHER PROCEDURES THAT REQUIRE A SHARP SURGICAL BLADE TO PUNCTURE OR CUT.: Brand: BARD-PARKER ® CARBON RIB-BACK BLADES

## (undated) DEVICE — DRAPE,U/ SHT,SPLIT,PLAS,STERIL: Brand: MEDLINE

## (undated) DEVICE — BNDG ESMARK 4IN 12FT LF STRL BLU

## (undated) DEVICE — STPLR SKIN VISISTAT WD 35CT

## (undated) DEVICE — SCREW, NON-LOCKING, 3.5 X 28MM
Type: IMPLANTABLE DEVICE | Site: ANKLE | Status: NON-FUNCTIONAL
Brand: MEDLINE UNITE
Removed: 2021-10-13

## (undated) DEVICE — DRSNG PAD ABD 8X10IN STRL

## (undated) DEVICE — CEMENT MIXING SYSTEM WITH FEMORAL BREAKWAY NOZZLE: Brand: REVOLUTION

## (undated) DEVICE — GLV SURG BIOGEL LTX PF 8

## (undated) DEVICE — DUAL CUT SAGITTAL BLADE

## (undated) DEVICE — 3M™ IOBAN™ 2 ANTIMICROBIAL INCISE DRAPE 6650EZ: Brand: IOBAN™ 2

## (undated) DEVICE — DRSNG WND SIL OPTIFOAM GENTLE BRDR ADHS W/SA 4X4IN

## (undated) DEVICE — SPNG LAP 18X18IN LF STRL PK/5

## (undated) DEVICE — DRSNG GZ CURAD XEROFORM NONADHS 5X9IN STRL

## (undated) DEVICE — SUT VIC 3/0 TIES 18IN J110T

## (undated) DEVICE — PENCL E/S ULTRAVAC TELESCP NOSE HOLSTR 10FT

## (undated) DEVICE — INTENDED FOR TISSUE SEPARATION, AND OTHER PROCEDURES THAT REQUIRE A SHARP SURGICAL BLADE TO PUNCTURE OR CUT.: Brand: BARD-PARKER ® DISPOSABLE SCALPELS

## (undated) DEVICE — AMBU AURAONCE U SIZE 2: Brand: AURAONCE

## (undated) DEVICE — MAT FLR ABSORBENT LG 4FT 10 2.5FT

## (undated) DEVICE — DRP C/ARMOR

## (undated) DEVICE — DISPOSABLE TOURNIQUET CUFF SINGLE BLADDER, SINGLE PORT AND QUICK CONNECT CONNECTOR: Brand: COLOR CUFF

## (undated) DEVICE — BNDG ELAS ELITE V/CLOSE 4IN 5YD LF STRL

## (undated) DEVICE — SUT VIC 2/0 TIES 18IN J111T

## (undated) DEVICE — GLV SURG BIOGEL LTX PF 8 1/2

## (undated) DEVICE — SKIN PREP TRAY W/CHG: Brand: MEDLINE INDUSTRIES, INC.

## (undated) DEVICE — PENCL ES MEGADINE EZ/CLEAN BUTN W/HOLSTR 10FT

## (undated) DEVICE — DRSNG GZ PETROLTM XEROFORM CURAD 1X8IN STRL

## (undated) DEVICE — Device

## (undated) DEVICE — SUT VIC 0 CT1 36IN J946H

## (undated) DEVICE — IMPLANTABLE DEVICE
Type: IMPLANTABLE DEVICE | Site: SHOULDER | Status: NON-FUNCTIONAL
Removed: 2021-10-13

## (undated) DEVICE — VIOLET BRAIDED (POLYGLACTIN 910), SYNTHETIC ABSORBABLE SUTURE: Brand: COATED VICRYL